# Patient Record
Sex: FEMALE | Race: WHITE | NOT HISPANIC OR LATINO | Employment: OTHER | URBAN - METROPOLITAN AREA
[De-identification: names, ages, dates, MRNs, and addresses within clinical notes are randomized per-mention and may not be internally consistent; named-entity substitution may affect disease eponyms.]

---

## 2017-02-13 ENCOUNTER — OFFICE VISIT (OUTPATIENT)
Dept: CARDIOLOGY | Facility: CLINIC | Age: 82
End: 2017-02-13

## 2017-02-13 VITALS
HEIGHT: 67 IN | SYSTOLIC BLOOD PRESSURE: 118 MMHG | BODY MASS INDEX: 24.17 KG/M2 | WEIGHT: 154 LBS | HEART RATE: 85 BPM | DIASTOLIC BLOOD PRESSURE: 60 MMHG

## 2017-02-13 DIAGNOSIS — I48.0 PAROXYSMAL ATRIAL FIBRILLATION (HCC): Primary | ICD-10-CM

## 2017-02-13 PROCEDURE — 93000 ELECTROCARDIOGRAM COMPLETE: CPT | Performed by: INTERNAL MEDICINE

## 2017-02-13 PROCEDURE — 99213 OFFICE O/P EST LOW 20 MIN: CPT | Performed by: INTERNAL MEDICINE

## 2017-02-13 NOTE — PROGRESS NOTES
Subjective:     Encounter Date:02/13/2017      Patient ID: Christianne Ordonez is a 87 y.o. female.    Chief Complaint: PAF, stroke    History of Present Illness     Dear Dr. Hoffmann,      I had the pleasure of seeing the patient in cardiac followup today.   As you well know, she is a lon 87-year-old woman with a history of paroxysmal atrial fibrillation and cerebral atherosclerosis.  She had an MCA stenosis causing a stroke, which we thought was possibly due to atrial fibrillation or intracerebral atherosclerosis. She was taking Eliquis for stroke prevention, which she has tolerated well.       She walks with a walker, and has had some occasional falls.   Luckily she has not had any serious bleeding as a result.      She still has an expressive aphasia, but really has recovered nicely from her stroke otherwise.           Review of Systems   All other systems reviewed and are negative.        ECG 12 Lead  Date/Time: 2/13/2017 2:49 PM  Performed by: GAYATRI ESQUIVEL  Authorized by: GAYATRI ESQUIVEL   Comparison: compared with previous ECG   Similar to previous ECG  Rhythm: sinus rhythm  BPM: 85  Comments: NSSTTWA               Objective:     Physical Exam   Constitutional: She is oriented to person, place, and time. She appears well-developed and well-nourished.   HENT:   Head: Normocephalic and atraumatic.   Neck: Normal range of motion. Neck supple.   Cardiovascular: Normal rate, regular rhythm and normal heart sounds.    Pulmonary/Chest: Effort normal and breath sounds normal.   Abdominal: Soft. Bowel sounds are normal.   Musculoskeletal: Normal range of motion.   Neurological: She is alert and oriented to person, place, and time.   Skin: Skin is warm and dry.   Psychiatric: She has a normal mood and affect. Her behavior is normal. Thought content normal.   Vitals reviewed.      Lab Review:       Assessment:          Diagnosis Plan   1. Paroxysmal atrial fibrillation            Plan:        It was a pleasure to see the  patient in cardiac follow-up today.  She is doing very well from the standpoint of her prior stroke.  She has had no new events.  She is tolerating her Eliquis well.  Given her age and her bleeding risk, I would feel more comfortable reducing her dose to 2.5 mg twice daily.  Otherwise, she will continue on the same medical regimen.  I will see her again in one year or sooner if symptoms warrant.      Atrial Fibrillation and Atrial Flutter  Assessment  • The patient has paroxysmal atrial fibrillation  • This is non-valvular in etiology  • The patient's CHADS2-VASc score is 5  • A [id number]-ROAw score of 2 or more is considered a high risk for a thromboembolic event  • Apixaban prescribed    Plan  • Attempt to maintain sinus rhythm  • Continue aspirin and apixaban for antithrombotic therapy, bleeding issues discussed  • Continue beta blocker for rate control

## 2017-05-02 ENCOUNTER — OFFICE VISIT (OUTPATIENT)
Dept: NEUROLOGY | Facility: CLINIC | Age: 82
End: 2017-05-02

## 2017-05-02 VITALS
HEIGHT: 67 IN | OXYGEN SATURATION: 96 % | SYSTOLIC BLOOD PRESSURE: 135 MMHG | DIASTOLIC BLOOD PRESSURE: 79 MMHG | HEART RATE: 79 BPM | BODY MASS INDEX: 25.11 KG/M2 | WEIGHT: 160 LBS

## 2017-05-02 DIAGNOSIS — Z86.73 HISTORY OF STROKE: ICD-10-CM

## 2017-05-02 DIAGNOSIS — I67.2 ATHEROSCLEROTIC CEREBROVASCULAR DISEASE: ICD-10-CM

## 2017-05-02 DIAGNOSIS — I48.91 ATRIAL FIBRILLATION, UNSPECIFIED TYPE (HCC): ICD-10-CM

## 2017-05-02 DIAGNOSIS — R41.82 ALTERED MENTAL STATUS, UNSPECIFIED ALTERED MENTAL STATUS TYPE: ICD-10-CM

## 2017-05-02 PROCEDURE — 99214 OFFICE O/P EST MOD 30 MIN: CPT | Performed by: NURSE PRACTITIONER

## 2017-05-02 RX ORDER — LORAZEPAM 0.5 MG/1
TABLET ORAL
Refills: 0 | COMMUNITY
Start: 2017-03-06 | End: 2019-05-29 | Stop reason: HOSPADM

## 2017-05-26 ENCOUNTER — HOSPITAL ENCOUNTER (OUTPATIENT)
Dept: NEUROLOGY | Facility: HOSPITAL | Age: 82
Discharge: HOME | End: 2017-05-26
Admitting: NURSE PRACTITIONER

## 2017-05-26 DIAGNOSIS — R41.82 ALTERED MENTAL STATUS, UNSPECIFIED ALTERED MENTAL STATUS TYPE: ICD-10-CM

## 2017-05-26 PROCEDURE — 95819 EEG AWAKE AND ASLEEP: CPT

## 2017-05-26 PROCEDURE — 95816 EEG AWAKE AND DROWSY: CPT | Performed by: PSYCHIATRY & NEUROLOGY

## 2017-05-31 ENCOUNTER — TELEPHONE (OUTPATIENT)
Dept: NEUROLOGY | Facility: CLINIC | Age: 82
End: 2017-05-31

## 2018-02-14 ENCOUNTER — OFFICE VISIT (OUTPATIENT)
Dept: CARDIOLOGY | Facility: CLINIC | Age: 83
End: 2018-02-14

## 2018-02-14 VITALS
BODY MASS INDEX: 24.36 KG/M2 | WEIGHT: 155.2 LBS | DIASTOLIC BLOOD PRESSURE: 68 MMHG | HEART RATE: 62 BPM | HEIGHT: 67 IN | SYSTOLIC BLOOD PRESSURE: 112 MMHG

## 2018-02-14 DIAGNOSIS — I48.0 PAROXYSMAL ATRIAL FIBRILLATION (HCC): Primary | ICD-10-CM

## 2018-02-14 PROCEDURE — 93000 ELECTROCARDIOGRAM COMPLETE: CPT | Performed by: PHYSICIAN ASSISTANT

## 2018-02-14 PROCEDURE — 99213 OFFICE O/P EST LOW 20 MIN: CPT | Performed by: PHYSICIAN ASSISTANT

## 2018-02-14 NOTE — PROGRESS NOTES
Date of Office Visit: 2018  Encounter Provider: DARIAN Chandra  Place of Service: [facility] CARDIOLOGY  Patient Name: Christianne Ordonez  :1929    Chief Complaint   Patient presents with   • Atrial Fibrillation     1 yr follow up   :     HPI: Christianne Ordonez is a 88 y.o. female , new to me, who presents today for follow-up.  Old records have been obtained and reviewed by me.  She is a patient of Dr. Lund's with a past cardiac history significant for paroxysmal atrial fibrillation and a cerebral atherosclerosis.  She has had a stroke in the past, and it was felt that this was due to either atrial fibrillation or and MCA stenosis.  She has been taking Eliquis for stroke prevention.  She was last in our office to see Dr. Lund on 2017.  At that visit, she was walking with a walker and had some recent falls, however she had not had any serious bleeding issues.  Because of her age and bleeding risk, Dr. Lund recommended reducing her dose of Eliquis down to 2.5 mg twice daily.  She is here today for yearly visit.   Since she was last in our office she's been doing well from a cardiac standpoint.  Fortunately she has not had anymore falls.  She is not having any bleeding issues on the Eliquis.  She lives with her son and daughter, who take excellent care of her.  She has some fatigue and some shortness of breath on exertion, however these are stable for her.  Overall she seems to be doing well.      Past Medical History:   Diagnosis Date   • Aphasia    • Arthritis    • Atrial fibrillation    • Cerebral atherosclerosis    • Depression    • Disease of thyroid gland    • Stroke        Past Surgical History:   Procedure Laterality Date   • KNEE SURGERY         Social History     Social History   • Marital status:      Spouse name: N/A   • Number of children: N/A   • Years of education: N/A     Occupational History   • Not on file.     Social History Main Topics   • Smoking status:  Never Smoker   • Smokeless tobacco: Never Used      Comment: caffeine use   • Alcohol use No   • Drug use: No   • Sexual activity: Not on file     Other Topics Concern   • Not on file     Social History Narrative       Family History   Problem Relation Age of Onset   • Cancer Sister    • Migraines Other        Review of Systems   Constitution: Positive for malaise/fatigue. Negative for chills, fever, weight gain and weight loss.   HENT: Negative for ear pain, hearing loss, nosebleeds and sore throat.    Eyes: Negative for double vision, pain and visual disturbance.   Cardiovascular: Negative for chest pain, dyspnea on exertion, irregular heartbeat, leg swelling, near-syncope, orthopnea, palpitations, paroxysmal nocturnal dyspnea and syncope.   Respiratory: Positive for shortness of breath. Negative for cough, sleep disturbances due to breathing, snoring and wheezing.    Endocrine: Negative for cold intolerance, heat intolerance and polyuria.   Skin: Negative for itching and rash.   Musculoskeletal: Negative for joint pain, joint swelling and myalgias.   Gastrointestinal: Negative for abdominal pain, diarrhea, melena, nausea and vomiting.   Genitourinary: Negative for frequency, hematuria and hesitancy.   Neurological: Negative for excessive daytime sleepiness, headaches, light-headedness, numbness, paresthesias and seizures.   Psychiatric/Behavioral: Negative for altered mental status and depression.   Allergic/Immunologic: Negative.    All other systems reviewed and are negative.      No Known Allergies      Current Outpatient Prescriptions:   •  apixaban (ELIQUIS) 2.5 MG tablet tablet, Take 1 tablet by mouth 2 (Two) Times a Day., Disp: 180 tablet, Rfl: 3  •  aspirin 81 MG tablet, Take by mouth daily., Disp: , Rfl:   •  atorvastatin (LIPITOR) 20 MG tablet, Take 1 tablet by mouth daily. As directed., Disp: , Rfl:   •  carvedilol (COREG) 3.125 MG tablet, Take 3.125 mg by mouth 2 (two) times a day with meals., Disp: ,  "Rfl:   •  docusate sodium (COLACE) 100 MG capsule, Take 1 capsule by mouth daily., Disp: , Rfl:   •  levothyroxine (SYNTHROID, LEVOTHROID) 50 MCG tablet, Take 50 mcg by mouth daily., Disp: , Rfl:   •  LORazepam (ATIVAN) 0.5 MG tablet, TK 4 TS PO QD, Disp: , Rfl: 0  •  PARoxetine (PAXIL) 20 MG tablet, TAKE ONE TABLET BY MOUTH ONCE DAILY, Disp: 90 tablet, Rfl: 0  •  saccharomyces boulardii (FLORASTOR) 250 MG capsule, Take 1 capsule by mouth 2 (two) times a day., Disp: , Rfl:   •  senna (SENOKOT) 8.6 MG tablet, Take 1 tablet by mouth daily., Disp: , Rfl:      Objective:     Vitals:    02/14/18 1510 02/14/18 1513   BP: 106/64 112/68   BP Location: Right arm Left arm   Pulse: 62    Weight: 70.4 kg (155 lb 3.2 oz)    Height: 170.2 cm (67\")      Body mass index is 24.31 kg/(m^2).    PHYSICAL EXAM:    Physical Exam   Constitutional: She is oriented to person, place, and time. She appears well-developed and well-nourished. No distress.   HENT:   Head: Normocephalic and atraumatic.   Eyes: Pupils are equal, round, and reactive to light.   Neck: No JVD present. No thyromegaly present.   Cardiovascular: Normal rate, normal heart sounds and intact distal pulses.  An irregular rhythm present.   No murmur heard.  Pulmonary/Chest: Effort normal and breath sounds normal. No respiratory distress.   Abdominal: Soft. Bowel sounds are normal. She exhibits no distension. There is no splenomegaly or hepatomegaly. There is no tenderness.   Musculoskeletal: Normal range of motion. She exhibits no edema.   Neurological: She is alert and oriented to person, place, and time.   Skin: Skin is warm and dry. She is not diaphoretic. No erythema.   Psychiatric: She has a normal mood and affect. Her behavior is normal. Judgment normal.         ECG 12 Lead  Date/Time: 2/14/2018 3:23 PM  Performed by: AJIT LEYVA.  Authorized by: AJIT LEYVA   Comparison: compared with previous ECG from 2/13/2017  Similar to previous ECG  Rhythm: atrial " fibrillation  BPM: 62  Clinical impression: abnormal ECG  Comments: Indication: Atrial fibrillation.              Assessment:       Diagnosis Plan   1. Paroxysmal atrial fibrillation  ECG 12 Lead     Orders Placed This Encounter   Procedures   • ECG 12 Lead     This order was created via procedure documentation          Plan:       1.  Atrial Fibrillation and Atrial Flutter  Assessment  • The patient has permanent atrial fibrillation  • This is non-valvular in etiology  • The patient's CHADS2-VASc score is 5  • A [id number]-IFRt score of 2 or more is considered a high risk for a thromboembolic event  • Apixaban prescribed    Plan  • Continue in atrial fibrillation with rate control  • Continue apixaban for antithrombotic therapy, bleeding issues discussed  • Continue beta blocker for rate control    Subjective - Objective  • Overall she stable and doing about the same.  I do think she is probably slowing down a little bit with her age, however she has no complaints of angina or heart failure at this time.  She is currently anticoagulated on low-dose Eliquis.  She has not had any falls.  I did discuss with the son that we may come to a place where we would have to discontinue the Eliquis if she starts having falls and becomes of bleeding risk.  However right now she appears to be stable, and her children are taking excellent care of her.  I'm not going to make any changes to her medical regimen today, and she will follow-up with Dr. Lund in 1 year.        As always, it has been a pleasure to participate in your patient's care.      Sincerely,         Codie Contreras PA-C

## 2018-02-27 RX ORDER — APIXABAN 2.5 MG/1
TABLET, FILM COATED ORAL
Qty: 180 TABLET | Refills: 3 | Status: SHIPPED | OUTPATIENT
Start: 2018-02-27 | End: 2020-01-21 | Stop reason: SDUPTHER

## 2018-05-08 ENCOUNTER — OFFICE VISIT (OUTPATIENT)
Dept: NEUROLOGY | Facility: CLINIC | Age: 83
End: 2018-05-08

## 2018-05-08 VITALS
WEIGHT: 152 LBS | HEART RATE: 86 BPM | BODY MASS INDEX: 23.86 KG/M2 | SYSTOLIC BLOOD PRESSURE: 105 MMHG | HEIGHT: 67 IN | OXYGEN SATURATION: 93 % | DIASTOLIC BLOOD PRESSURE: 59 MMHG

## 2018-05-08 DIAGNOSIS — R47.01 ACQUIRED APHASIA: ICD-10-CM

## 2018-05-08 DIAGNOSIS — Z86.73 HISTORY OF STROKE: ICD-10-CM

## 2018-05-08 DIAGNOSIS — I67.2 ATHEROSCLEROTIC CEREBROVASCULAR DISEASE: ICD-10-CM

## 2018-05-08 DIAGNOSIS — R41.3 MEMORY LOSS: ICD-10-CM

## 2018-05-08 PROCEDURE — 99213 OFFICE O/P EST LOW 20 MIN: CPT | Performed by: NURSE PRACTITIONER

## 2018-05-08 RX ORDER — DONEPEZIL HYDROCHLORIDE 5 MG/1
5 TABLET, FILM COATED ORAL NIGHTLY
Qty: 30 TABLET | Refills: 1 | Status: SHIPPED | OUTPATIENT
Start: 2018-05-08 | End: 2018-06-06 | Stop reason: DRUGHIGH

## 2018-05-08 NOTE — PROGRESS NOTES
DOS: 5/10/2018  NAME: Christianne Ordonez   : 1929  PCP: Ray Torre MD    Chief Complaint   Patient presents with   • Stroke        Neurological Problem and Interval History:  89 y.o. RHW female with Afib, HLD, HTN and stroke. She presents today for her annual stroke follow up.     She denies any new S/S of stroke. She does have falls, loses her balance, rome when backing up. She does not LOC. Prior to her stroke she had syncope. No H/As. She did have one stool that had bright red blood, 3 weeks ago. She does have hemorrhoids. She does take 3 different medicaitons for constipation. She is otherwise tolerating her Eliquis and ASA. She sometimes take melatonin but she does take ativan to sleep. She is more confused at night. Her granddaughter notes her memory is a bit worse. She could not tell me the month an it is her birthday month. She always calls her daughter by the wrong name but this is not new. She would like something for her memory. She does sleep most of the day and then has trouble sleeping at night. She is able to dress and feed herself. Her daughter helps her with house chores and cooking. Her daughter lives with her. Her last visit we did an EEG and it was normal.     2015. She had a Left hemispheric stroke with new Dx of A Fib and MCA stenosis . She was not on any blood thinners or ASA. She is able to do some of her ADL's since her stroke but she is not back to baseline. She was D/C to subacute rehab and now has home therapy. She has been on Eliquis and ASA since.     Review of Systems:        Review of Systems   Constitutional: Positive for fatigue. Negative for activity change, appetite change, chills, diaphoresis, fever and unexpected weight change.   HENT: Positive for tinnitus (right ear) and trouble swallowing. Negative for drooling, hearing loss and voice change.    Eyes: Positive for visual disturbance (left eye blurry). Negative for photophobia and pain.   Respiratory:  "Positive for shortness of breath and wheezing. Negative for chest tightness.    Cardiovascular: Positive for leg swelling. Negative for chest pain and palpitations.   Gastrointestinal: Negative for blood in stool, nausea and vomiting.   Endocrine: Negative for cold intolerance and heat intolerance.   Genitourinary: Negative for difficulty urinating.   Musculoskeletal: Positive for gait problem and neck pain (right side). Negative for neck stiffness.   Skin: Negative for rash.   Neurological: Positive for dizziness (pt has fallen recently), speech difficulty and light-headedness. Negative for tremors, seizures, syncope, facial asymmetry, weakness, numbness and headaches.   Hematological: Bruises/bleeds easily.   Psychiatric/Behavioral: Positive for agitation, confusion and sleep disturbance. Negative for behavioral problems, hallucinations and suicidal ideas. The patient is nervous/anxious.        \"The following portions of the patient's history were reviewed and updated as appropriate: allergies, current medications, past family history, past medical history, past social history, past surgical history and problem list.\"      Laboratory Results:             9/2017 , , LDL 68, HDL 47, TSH 20.96  No results found for: HGBA1C    No results found for: RPR  No results found for: [id number]    Physical Examination:  mRS: 2  General Appearance:   Well developed, well nourished, well groomed, alert, and cooperative.  HEENT: Normocephalic.    Neck and Spine: No bruits.  Cardiac: Irregularly irregular. No murmurs.  Extremities:    No edema or deformities.   Neurological examination:  Higher Integrative  Function: Oriented to self, no time. Normal registration, 3/3 recall with visual objects, attention span and concentration. Normal language including comprehension, aphasia with word searching on spontaneous speech, repetition, reading, naming and vocabulary all with paraphasic errors. No neglect with normal " visual-spatial function and construction. Fair fund of knowledge and higher integrative function.  CN II: Normal visual acuity and visual fields.    CN III IV VI: Extraocular movements are full without nystagmus.   CN V: Normal facial sensation and strength of muscles of mastication.  CN VII: Facial movements are symmetric. No weakness.  CN VIII:   Auditory acuity is normal.  CN IX & X:   Symmetric palatal movement.  CN XI: Sternocleidomastoid and trapezius are normal.  No weakness.  CN XII:   The tongue is midline.  No atrophy or fasciculations.  Motor: Normal muscle strength, bulk and tone in upper and lower extremities.  Termor of hands, L>R  Sensation: Normal to light touch, temperature, and proprioception in arms and legs. Normal graphesthesia and no extinction on DSS.  Station and Gait: Unsteady, slow, uses a walker   Coordination: Rapid alternating movements are normal.      Diagnoses / Discussion:  Ms. Ordonez is an 89yo who presents today for her annual stroke follow up. She has residual word searching/aphasia from her LMCA stroke from 1/2015. The etiology of her stroke is her new onset atrial fibrillation, not on anticoagulation at the time of the event. She has not had any obvious recurrent stroke symptoms on Eliquis and ASA. She has continued memory trouble which may be slightly worse. I will discuss the need for neuropsych testing and starting Aricept with Dr. Luna. She likely has sundowning and may benefit from seroquel, with wean off of ativan, will discuss at FU visit with starting this if does well on aricept.   She is on good secondary stroke prevention medications, continue to monitor BP. She needs to FU with Dr. Torre for lipid panel but also FU TSH. It was very elevated in 9/2017 which is concerning. She and her granddaughter agree with the plan and will call with any questions or concerns.        Plan:   Start donepezil 5mg daily for 1 month then increase to 10mg   Consider seroquel     continue eliquis and ASA    Blood pressure control to <130/80   Goal LDL <70-recommend high dose statins-    Serum glucose < 140   Call 911 for stroke any stroke symptoms   Follow-up 1 month  Christianne was seen today for stroke.    Diagnoses and all orders for this visit:    History of stroke    Memory loss  -     donepezil (ARICEPT) 5 MG tablet; Take 1 tablet by mouth Every Night.    Atherosclerotic cerebrovascular disease    Acquired aphasia        Coding

## 2018-06-06 ENCOUNTER — OFFICE VISIT (OUTPATIENT)
Dept: NEUROLOGY | Facility: CLINIC | Age: 83
End: 2018-06-06

## 2018-06-06 VITALS
OXYGEN SATURATION: 94 % | HEIGHT: 67 IN | SYSTOLIC BLOOD PRESSURE: 108 MMHG | WEIGHT: 151 LBS | HEART RATE: 87 BPM | BODY MASS INDEX: 23.7 KG/M2 | DIASTOLIC BLOOD PRESSURE: 58 MMHG

## 2018-06-06 DIAGNOSIS — R41.3 MEMORY LOSS: ICD-10-CM

## 2018-06-06 PROCEDURE — 99212 OFFICE O/P EST SF 10 MIN: CPT | Performed by: NURSE PRACTITIONER

## 2018-06-06 RX ORDER — DONEPEZIL HYDROCHLORIDE 10 MG/1
10 TABLET, FILM COATED ORAL NIGHTLY
Qty: 90 TABLET | Refills: 3 | Status: SHIPPED | OUTPATIENT
Start: 2018-06-06 | End: 2019-05-29 | Stop reason: HOSPADM

## 2018-06-06 NOTE — PROGRESS NOTES
DOS: 2018  NAME: Christianne Ordonez   : 1929  PCP: Ray Torre MD    Chief Complaint   Patient presents with   • Stroke        Neurological Problem and Interval History:  89 y.o. RHW female with Afib, HLD, HTN, dementia and stroke. She presents today for memory loss follow up.     She is tolerating the 5mg of Aricept. She feels it has helped her over all demeanor. She denies any new S/S of stroke. Her granddaughter notes she has good and bad days and the days she is around her daughter she is irritated and has bad days. They deny hallucinations or mood issues.        Review of Systems:        Review of Systems   Constitutional: Positive for fatigue. Negative for chills and fever.   HENT: Positive for tinnitus (sometimes) and trouble swallowing. Negative for hearing loss and postnasal drip.    Eyes: Positive for pain, discharge, redness, itching and visual disturbance.   Respiratory: Positive for shortness of breath and wheezing. Negative for cough.    Cardiovascular: Negative for chest pain, palpitations and leg swelling.   Gastrointestinal: Negative for constipation, diarrhea, nausea and vomiting.   Endocrine: Negative for cold intolerance, heat intolerance and polydipsia.   Genitourinary: Negative for decreased urine volume, difficulty urinating, frequency and urgency.   Musculoskeletal: Positive for back pain (when standing up) and gait problem (uses a walker). Negative for neck pain and neck stiffness.   Skin: Negative for color change, rash and wound.   Allergic/Immunologic: Negative for environmental allergies, food allergies and immunocompromised state.   Neurological: Positive for numbness (left leg sometimes). Negative for dizziness, tremors, seizures, syncope, facial asymmetry, speech difficulty, weakness, light-headedness and headaches.   Hematological: Negative for adenopathy. Bruises/bleeds easily.   Psychiatric/Behavioral: Positive for sleep disturbance. Negative for agitation, behavioral  "problems, confusion, decreased concentration, dysphoric mood, hallucinations, self-injury and suicidal ideas. The patient is nervous/anxious. The patient is not hyperactive.        Current Outpatient Prescriptions on File Prior to Visit   Medication Sig Dispense Refill   • aspirin 81 MG tablet Take by mouth daily.     • atorvastatin (LIPITOR) 20 MG tablet Take 1 tablet by mouth daily. As directed.     • carvedilol (COREG) 3.125 MG tablet Take 3.125 mg by mouth 2 (two) times a day with meals.     • docusate sodium (COLACE) 100 MG capsule Take 1 capsule by mouth daily.     • ELIQUIS 2.5 MG tablet tablet TAKE 1 TABLET BY MOUTH TWICE DAILY 180 tablet 3   • levothyroxine (SYNTHROID, LEVOTHROID) 50 MCG tablet Take 50 mcg by mouth daily.     • LORazepam (ATIVAN) 0.5 MG tablet TK 4 TS PO QD  0   • PARoxetine (PAXIL) 20 MG tablet TAKE ONE TABLET BY MOUTH ONCE DAILY 90 tablet 0   • saccharomyces boulardii (FLORASTOR) 250 MG capsule Take 1 capsule by mouth 2 (two) times a day.     • senna (SENOKOT) 8.6 MG tablet Take 1 tablet by mouth daily.       No current facility-administered medications on file prior to visit.       \"The following portions of the patient's history were reviewed and updated as appropriate: allergies, current medications, past family history, past medical history, past social history, past surgical history and problem list.\"    Laboratory Results:             No results found for: HGBA1C    No results found for: CHOL    No results found for: HDL    No results found for: LDL    No results found for: TRIG  No results found for: RPR  No results found for: TSH  No results found for: [id number]    Physical Examination:   General Appearance:   Well developed, well nourished, well groomed, alert, and cooperative.  HEENT: Normocephalic.      Neurological examination:  Higher Integrative  Function: Oriented to June, 2010 when given list, Trump, place and person. Normal registration, attention span and concentration. " Normal language including comprehension, spontaneous speech, repetition, naming and vocabulary. No neglect. Poor-fair fund of knowledge and higher integrative function.  CN III IV VI: Extraocular movements are full without nystagmus.   CN V: Normal facial sensation and strength of muscles of mastication.  CN VII: Facial movements are symmetric. No weakness.  CN VIII:   Bilateral decreased hearing  CN IX & X:   Symmetric palatal movement.  CN XI: Sternocleidomastoid and trapezius are normal.  No weakness.  CN XII:   The tongue is midline.  No atrophy or fasciculations.  Motor: Normal muscle strength, bulk and tone in upper and lower extremities.    Station and Gait:  Slow, cautious, with a limp on the left      Diagnoses / Discussion:  Ms. Ordonez is an 88yo who presents today for follow for memory loss and initiation of Aricept. She has tolerated this. I will increase her dose to 10mg nightly. They will call with any side affects. They will call with any change in behavior or hallucinations.     Plan:   Increase Aricept to 10mg    Blood pressure control to <130/80   Goal LDL <70-recommend high dose statins-    Serum glucose < 140   Call 911 for stroke any stroke symptoms   Follow-up 1 year  Christianne was seen today for stroke.    Diagnoses and all orders for this visit:    Memory loss  -     donepezil (ARICEPT) 10 MG tablet; Take 1 tablet by mouth Every Night.        Coding

## 2018-08-08 DIAGNOSIS — R41.3 MEMORY LOSS: ICD-10-CM

## 2018-08-08 RX ORDER — DONEPEZIL HYDROCHLORIDE 5 MG/1
5 TABLET, FILM COATED ORAL NIGHTLY
Qty: 30 TABLET | Refills: 0 | Status: SHIPPED | OUTPATIENT
Start: 2018-08-08 | End: 2019-07-29 | Stop reason: SDUPTHER

## 2018-12-22 ENCOUNTER — APPOINTMENT (OUTPATIENT)
Dept: GENERAL RADIOLOGY | Facility: HOSPITAL | Age: 83
End: 2018-12-22

## 2018-12-22 ENCOUNTER — HOSPITAL ENCOUNTER (EMERGENCY)
Facility: HOSPITAL | Age: 83
Discharge: HOME | End: 2018-12-22
Attending: EMERGENCY MEDICINE | Admitting: EMERGENCY MEDICINE

## 2018-12-22 ENCOUNTER — APPOINTMENT (OUTPATIENT)
Dept: CT IMAGING | Facility: HOSPITAL | Age: 83
End: 2018-12-22

## 2018-12-22 VITALS
BODY MASS INDEX: 25.43 KG/M2 | HEIGHT: 67 IN | OXYGEN SATURATION: 94 % | DIASTOLIC BLOOD PRESSURE: 75 MMHG | HEART RATE: 67 BPM | TEMPERATURE: 99.4 F | RESPIRATION RATE: 16 BRPM | SYSTOLIC BLOOD PRESSURE: 121 MMHG | WEIGHT: 162 LBS

## 2018-12-22 DIAGNOSIS — S22.42XA MULTIPLE FRACTURES OF RIBS, LEFT SIDE, INITIAL ENCOUNTER FOR CLOSED FRACTURE: Primary | ICD-10-CM

## 2018-12-22 PROCEDURE — 70450 CT HEAD/BRAIN W/O DYE: CPT

## 2018-12-22 PROCEDURE — 71101 X-RAY EXAM UNILAT RIBS/CHEST: CPT

## 2018-12-22 PROCEDURE — 99284 EMERGENCY DEPT VISIT MOD MDM: CPT

## 2018-12-22 RX ORDER — HYDROCODONE BITARTRATE AND ACETAMINOPHEN 5; 325 MG/1; MG/1
1 TABLET ORAL EVERY 6 HOURS PRN
Qty: 15 TABLET | Refills: 0 | Status: ON HOLD | OUTPATIENT
Start: 2018-12-22 | End: 2019-05-29 | Stop reason: SDUPTHER

## 2018-12-22 NOTE — ED PROVIDER NOTES
EMERGENCY DEPARTMENT ENCOUNTER    CHIEF COMPLAINT  Chief Complaint: Pain post fall  History given by: Pt, Family  History limited by: none  Room Number: 14/14  PMD: Ray Torre MD      HPI:  Pt is a 89 y.o. female who presents complaining of worsening pain PTA after fall 4 days ago. Pt c/o left rib pain. Per family, pt has been complaining of left breast pain. Pt states she didn't come in initially due to feeling fine. Pt unsure if she hit her head. Pt is on Eliquis.     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     Resolved Ambulatory Problems     Diagnosis Date Noted   • No Resolved Ambulatory Problems     Past Medical History:   Diagnosis Date   • Aphasia    • Arthritis    • Atrial fibrillation (CMS/Formerly [facility])    • Cerebral atherosclerosis    • Depression    • Disease of thyroid gland    • Stroke (CMS/Formerly [facility])        PAST SURGICAL HISTORY  Past Surgical History:   Procedure Laterality Date   • KNEE SURGERY         FAMILY HISTORY  Family History   Problem Relation Age of Onset   • Cancer Sister    • Migraines Other        SOCIAL HISTORY  Social History     Socioeconomic History   • Marital status:      Spouse name: Not on file   • Number of children: Not on file   • Years of education: Not on file   • Highest education level: Not on file   Social Needs   • Financial resource strain: Not on file   • Food insecurity - worry: Not on file   • Food insecurity - inability: Not on file   • Transportation needs - medical:  Not on file   • Transportation needs - non-medical: Not on file   Occupational History   • Not on file   Tobacco Use   • Smoking status: Never Smoker   • Smokeless tobacco: Never Used   • Tobacco comment: caffeine use   Substance and Sexual Activity   • Alcohol use: No   • Drug use: No   • Sexual activity: Not on file   Other Topics Concern   • Not on file   Social History Narrative   • Not on file       ALLERGIES  Patient has no known allergies.    REVIEW OF SYSTEMS  Review of Systems   Constitutional: Negative for fever.   HENT: Negative for sore throat.    Eyes: Negative.    Respiratory: Negative for cough and shortness of breath.    Cardiovascular: Positive for chest pain (left rib pain).   Gastrointestinal: Negative for abdominal pain, diarrhea and vomiting.   Genitourinary: Negative for dysuria.   Musculoskeletal: Negative for neck pain.   Skin: Negative for rash.   Allergic/Immunologic: Negative.    Neurological: Negative for weakness, numbness and headaches.   Hematological: Negative.    Psychiatric/Behavioral: Negative.    All other systems reviewed and are negative.      PHYSICAL EXAM  ED Triage Vitals   Temp Heart Rate Resp BP SpO2   12/22/18 0017 12/22/18 0017 12/22/18 0017 12/22/18 0017 12/22/18 0017   99.4 °F (37.4 °C) 72 17 148/78 95 %      Temp src Heart Rate Source Patient Position BP Location FiO2 (%)   12/22/18 0017 12/22/18 0031 12/22/18 0031 12/22/18 0031 --   Tympanic Monitor Sitting Right arm        Physical Exam   Constitutional: She is oriented to person, place, and time. No distress.   Eyes: EOM are normal.   Neck: Normal range of motion.   Cardiovascular: Normal rate and regular rhythm.   Pulmonary/Chest: Effort normal and breath sounds normal. No respiratory distress. She exhibits tenderness (mild left lateral).   Neurological: She is alert and oriented to person, place, and time. She has normal sensation and normal strength.   Skin: Skin is warm and dry.   No ecchymosis to chest    Psychiatric: Affect normal.   Nursing note and vitals reviewed.      RADIOLOGY  CT Head Without Contrast   Final Result   1. No acute intracranial abnormality.                           This report was finalized on 12/22/2018 1:13 AM by Andrea Galicia M.D.          XR Ribs Left With PA Chest   Final Result   Left rib fractures as above, no pneumothorax       This report was finalized on 12/22/2018 1:11 AM by Andrea Galicia M.D.               I ordered the above noted radiological studies. Interpreted by radiologist. Reviewed by me in PACS.       PROCEDURES  Procedures      PROGRESS AND CONSULTS     0045  Ordered CT Head and XR L rib for further evaluation.    0125  Pt recheck. Pt is resting in bed comfortably. Family at bedside. Notified pt of negative CT Head results and XR L rib that shows Fx of left 4th and 5th ribs that are slightly displaced. Ordered spirometer to check breathing daily. Discussed plan to discharge pt home with Rx for hydrocodone. Pt and family understands and agrees with plan, all concerns addressed.     MEDICAL DECISION MAKING  Results were reviewed/discussed with the patient and they were also made aware of online access. Pt also made aware that some labs, such as cultures, will not be resulted during ER visit and follow up with PMD is necessary.     MDM  Number of Diagnoses or Management Options  Multiple fractures of ribs, left side, initial encounter for closed fracture:      Amount and/or Complexity of Data Reviewed  Tests in the radiology section of CPT®: ordered and reviewed (XR L rib that shows Fx of left 4th and 5th ribs that are slightly displaced. CT Head negative acute. )           DIAGNOSIS  Final diagnoses:   Multiple fractures of ribs, left side, initial encounter for closed fracture       DISPOSITION  DISCHARGE    Patient discharged in stable condition.    Reviewed implications of results, diagnosis, meds, responsibility to follow up, warning signs and symptoms of possible  worsening, potential complications and reasons to return to ER.    Patient/Family voiced understanding of above instructions.    Discussed plan for discharge, as there is no emergent indication for admission. Patient referred to primary care provider for BP management due to today's BP. Pt/family is agreeable and understands need for follow up and repeat testing.  Pt is aware that discharge does not mean that nothing is wrong but it indicates no emergency is present that requires admission and they must continue care with follow-up as given below or physician of their choice.     FOLLOW-UP  Ray Torre MD  [address] [id number] [phone]    Schedule an appointment as soon as possible for a visit            Medication List      New Prescriptions    HYDROcodone-acetaminophen 5-325 MG per tablet  Commonly known as:  NORCO  Take 1 tablet by mouth Every 6 (Six) Hours As Needed for Moderate Pain .              Latest Documented Vital Signs:  As of 2:29 AM  BP- 121/75 HR- 67 Temp- 99.4 °F (37.4 °C) (Tympanic) O2 sat- 94%    --  Documentation assistance provided by sonu Junior for Dr. Thao.  Information recorded by the scribe was done at my direction and has been verified and validated by me.          John Junior  12/22/18 [id number]       Kurtis Thao MD  12/22/18 9137     Abdomen soft, nontender, nondistended, bowel sounds present in all 4 quadrants.

## 2018-12-22 NOTE — ED NOTES
Pt reports falling 3 days ago. Pt was not seen for fall. C/o left shoulder pain and left breast pain. Pt unsure if hit head. Denies LOC. Pt takes eliquis.        Noreen Morales RN  12/22/18 0021

## 2019-05-21 ENCOUNTER — APPOINTMENT (OUTPATIENT)
Dept: CT IMAGING | Facility: HOSPITAL | Age: 84
End: 2019-05-21

## 2019-05-21 ENCOUNTER — HOSPITAL ENCOUNTER (EMERGENCY)
Facility: HOSPITAL | Age: 84
Discharge: HOME | End: 2019-05-21
Attending: EMERGENCY MEDICINE | Admitting: EMERGENCY MEDICINE

## 2019-05-21 ENCOUNTER — APPOINTMENT (OUTPATIENT)
Dept: GENERAL RADIOLOGY | Facility: HOSPITAL | Age: 84
End: 2019-05-21

## 2019-05-21 VITALS
HEIGHT: 67 IN | RESPIRATION RATE: 18 BRPM | BODY MASS INDEX: 28.25 KG/M2 | OXYGEN SATURATION: 94 % | HEART RATE: 55 BPM | TEMPERATURE: 98 F | DIASTOLIC BLOOD PRESSURE: 64 MMHG | SYSTOLIC BLOOD PRESSURE: 143 MMHG | WEIGHT: 180 LBS

## 2019-05-21 DIAGNOSIS — S00.81XA ABRASION OF FOREHEAD, INITIAL ENCOUNTER: Primary | ICD-10-CM
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
## 2019-05-21 DIAGNOSIS — S82.045A CLOSED NONDISPLACED COMMINUTED FRACTURE OF LEFT PATELLA, INITIAL ENCOUNTER: ICD-10-CM

## 2019-05-21 DIAGNOSIS — S70.02XA CONTUSION OF LEFT HIP, INITIAL ENCOUNTER: ICD-10-CM

## 2019-05-21 PROCEDURE — 73560 X-RAY EXAM OF KNEE 1 OR 2: CPT

## 2019-05-21 PROCEDURE — 99283 EMERGENCY DEPT VISIT LOW MDM: CPT

## 2019-05-21 PROCEDURE — 73502 X-RAY EXAM HIP UNI 2-3 VIEWS: CPT

## 2019-05-21 PROCEDURE — 70450 CT HEAD/BRAIN W/O DYE: CPT

## 2019-05-24 PROCEDURE — 99284 EMERGENCY DEPT VISIT MOD MDM: CPT

## 2019-05-25 ENCOUNTER — HOSPITAL ENCOUNTER (INPATIENT)
Facility: HOSPITAL | Age: 84
LOS: 3 days | Discharge: NURSING, REHAB OR LONG-TERM CARE | End: 2019-05-29
Attending: EMERGENCY MEDICINE | Admitting: HOSPITALIST
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
## 2019-05-25 DIAGNOSIS — M62.3 IMMOBILITY SYNDROME: ICD-10-CM

## 2019-05-25 DIAGNOSIS — S82.032A CLOSED DISPLACED TRANSVERSE FRACTURE OF LEFT PATELLA, INITIAL ENCOUNTER: Primary | ICD-10-CM

## 2019-05-25 DIAGNOSIS — R26.2 DIFFICULTY WALKING: ICD-10-CM
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
## 2019-05-25 LAB
ALBUMIN SERPL-MCNC: 4.2 G/DL (ref 3.5–5.2)
ALBUMIN/GLOB SERPL: 1.1 G/DL
ALP SERPL-CCNC: 94 U/L (ref 39–117)
ALT SERPL W P-5'-P-CCNC: 19 U/L (ref 1–33)
ANION GAP SERPL CALCULATED.3IONS-SCNC: 11.2 MMOL/L
APTT PPP: 32.9 SECONDS (ref 22.7–35.4)
AST SERPL-CCNC: 26 U/L (ref 1–32)
BACTERIA UR QL AUTO: ABNORMAL /HPF
BASOPHILS # BLD AUTO: 0.12 10*3/MM3 (ref 0–0.2)
BASOPHILS NFR BLD AUTO: 0.8 % (ref 0–1.5)
BILIRUB SERPL-MCNC: 0.9 MG/DL (ref 0.2–1.2)
BILIRUB UR QL STRIP: NEGATIVE
BUN BLD-MCNC: 19 MG/DL (ref 8–23)
BUN/CREAT SERPL: 24.4 (ref 7–25)
CALCIUM SPEC-SCNC: 9.4 MG/DL (ref 8.2–9.6)
CHLORIDE SERPL-SCNC: 100 MMOL/L (ref 98–107)
CK SERPL-CCNC: 191 U/L (ref 20–180)
CLARITY UR: ABNORMAL
CO2 SERPL-SCNC: 31.8 MMOL/L (ref 22–29)
COLOR UR: ABNORMAL
CREAT BLD-MCNC: 0.78 MG/DL (ref 0.57–1)
DEPRECATED RDW RBC AUTO: 63.1 FL (ref 37–54)
EOSINOPHIL # BLD AUTO: 0.27 10*3/MM3 (ref 0–0.4)
EOSINOPHIL NFR BLD AUTO: 1.8 % (ref 0.3–6.2)
ERYTHROCYTE [DISTWIDTH] IN BLOOD BY AUTOMATED COUNT: 17.7 % (ref 12.3–15.4)
GFR SERPL CREATININE-BSD FRML MDRD: 69 ML/MIN/1.73
GLOBULIN UR ELPH-MCNC: 3.8 GM/DL
GLUCOSE BLD-MCNC: 115 MG/DL (ref 65–99)
GLUCOSE UR STRIP-MCNC: NEGATIVE MG/DL
HCT VFR BLD AUTO: 43 % (ref 34–46.6)
HGB BLD-MCNC: 13.4 G/DL (ref 12–15.9)
HGB UR QL STRIP.AUTO: ABNORMAL
HYALINE CASTS UR QL AUTO: ABNORMAL /LPF
IMM GRANULOCYTES # BLD AUTO: 0.09 10*3/MM3 (ref 0–0.05)
IMM GRANULOCYTES NFR BLD AUTO: 0.6 % (ref 0–0.5)
INR PPP: 1.27 (ref 0.9–1.1)
KETONES UR QL STRIP: NEGATIVE
LEUKOCYTE ESTERASE UR QL STRIP.AUTO: ABNORMAL
LYMPHOCYTES # BLD AUTO: 1.94 10*3/MM3 (ref 0.7–3.1)
LYMPHOCYTES NFR BLD AUTO: 13.2 % (ref 19.6–45.3)
MCH RBC QN AUTO: 30.3 PG (ref 26.6–33)
MCHC RBC AUTO-ENTMCNC: 31.2 G/DL (ref 31.5–35.7)
MCV RBC AUTO: 97.3 FL (ref 79–97)
MONOCYTES # BLD AUTO: 1.21 10*3/MM3 (ref 0.1–0.9)
MONOCYTES NFR BLD AUTO: 8.2 % (ref 5–12)
NEUTROPHILS # BLD AUTO: 11.04 10*3/MM3 (ref 1.7–7)
NEUTROPHILS NFR BLD AUTO: 75.4 % (ref 42.7–76)
NITRITE UR QL STRIP: NEGATIVE
NRBC BLD AUTO-RTO: 0.1 /100 WBC (ref 0–0.2)
PH UR STRIP.AUTO: <=5 [PH] (ref 5–8)
PLATELET # BLD AUTO: 229 10*3/MM3 (ref 140–450)
PMV BLD AUTO: 11.1 FL (ref 6–12)
POTASSIUM BLD-SCNC: 4.5 MMOL/L (ref 3.5–5.2)
PROT SERPL-MCNC: 8 G/DL (ref 6–8.5)
PROT UR QL STRIP: ABNORMAL
PROTHROMBIN TIME: 15.6 SECONDS (ref 11.7–14.2)
RBC # BLD AUTO: 4.42 10*6/MM3 (ref 3.77–5.28)
RBC # UR: ABNORMAL /HPF
REF LAB TEST METHOD: ABNORMAL
SODIUM BLD-SCNC: 143 MMOL/L (ref 136–145)
SP GR UR STRIP: 1.03 (ref 1–1.03)
SQUAMOUS #/AREA URNS HPF: ABNORMAL /HPF
UROBILINOGEN UR QL STRIP: ABNORMAL
WBC NRBC COR # BLD: 14.67 10*3/MM3 (ref 3.4–10.8)
WBC UR QL AUTO: ABNORMAL /HPF

## 2019-05-25 PROCEDURE — G0378 HOSPITAL OBSERVATION PER HR: HCPCS

## 2019-05-25 PROCEDURE — 82550 ASSAY OF CK (CPK): CPT | Performed by: PHYSICIAN ASSISTANT

## 2019-05-25 PROCEDURE — 85730 THROMBOPLASTIN TIME PARTIAL: CPT | Performed by: PHYSICIAN ASSISTANT

## 2019-05-25 PROCEDURE — 85025 COMPLETE CBC W/AUTO DIFF WBC: CPT | Performed by: PHYSICIAN ASSISTANT

## 2019-05-25 PROCEDURE — 25010000002 HYDROMORPHONE PER 4 MG: Performed by: HOSPITALIST

## 2019-05-25 PROCEDURE — 99222 1ST HOSP IP/OBS MODERATE 55: CPT | Performed by: ORTHOPAEDIC SURGERY

## 2019-05-25 PROCEDURE — 85610 PROTHROMBIN TIME: CPT | Performed by: PHYSICIAN ASSISTANT

## 2019-05-25 PROCEDURE — 80053 COMPREHEN METABOLIC PANEL: CPT | Performed by: PHYSICIAN ASSISTANT

## 2019-05-25 PROCEDURE — 81001 URINALYSIS AUTO W/SCOPE: CPT | Performed by: PHYSICIAN ASSISTANT

## 2019-05-25 PROCEDURE — 93010 ELECTROCARDIOGRAM REPORT: CPT | Performed by: INTERNAL MEDICINE

## 2019-05-25 PROCEDURE — 93005 ELECTROCARDIOGRAM TRACING: CPT | Performed by: HOSPITALIST

## 2019-05-25 RX ORDER — DONEPEZIL HYDROCHLORIDE 10 MG/1
10 TABLET, FILM COATED ORAL NIGHTLY
Status: DISCONTINUED | OUTPATIENT
Start: 2019-05-25 | End: 2019-05-29 | Stop reason: HOSPADM

## 2019-05-25 RX ORDER — CARVEDILOL 3.12 MG/1
3.12 TABLET ORAL EVERY 12 HOURS SCHEDULED
Status: DISCONTINUED | OUTPATIENT
Start: 2019-05-25 | End: 2019-05-29 | Stop reason: HOSPADM

## 2019-05-25 RX ORDER — LORAZEPAM 0.5 MG/1
0.5 TABLET ORAL NIGHTLY
Status: DISCONTINUED | OUTPATIENT
Start: 2019-05-25 | End: 2019-05-27

## 2019-05-25 RX ORDER — SODIUM CHLORIDE 0.9 % (FLUSH) 0.9 %
10 SYRINGE (ML) INJECTION AS NEEDED
Status: DISCONTINUED | OUTPATIENT
Start: 2019-05-25 | End: 2019-05-29 | Stop reason: HOSPADM

## 2019-05-25 RX ORDER — ASPIRIN 81 MG/1
81 TABLET, CHEWABLE ORAL DAILY
Status: DISCONTINUED | OUTPATIENT
Start: 2019-05-25 | End: 2019-05-29 | Stop reason: HOSPADM

## 2019-05-25 RX ORDER — ATORVASTATIN CALCIUM 20 MG/1
20 TABLET, FILM COATED ORAL NIGHTLY
Status: DISCONTINUED | OUTPATIENT
Start: 2019-05-25 | End: 2019-05-25

## 2019-05-25 RX ORDER — QUETIAPINE FUMARATE 25 MG/1
25 TABLET, FILM COATED ORAL NIGHTLY
COMMUNITY
End: 2019-05-29 | Stop reason: HOSPADM

## 2019-05-25 RX ORDER — LEVOTHYROXINE SODIUM 0.05 MG/1
50 TABLET ORAL
Status: DISCONTINUED | OUTPATIENT
Start: 2019-05-25 | End: 2019-05-26

## 2019-05-25 RX ORDER — PANTOPRAZOLE SODIUM 40 MG/1
40 TABLET, DELAYED RELEASE ORAL
Status: DISCONTINUED | OUTPATIENT
Start: 2019-05-26 | End: 2019-05-29 | Stop reason: HOSPADM

## 2019-05-25 RX ORDER — HYDROMORPHONE HYDROCHLORIDE 1 MG/ML
0.5 INJECTION, SOLUTION INTRAMUSCULAR; INTRAVENOUS; SUBCUTANEOUS 4 TIMES DAILY PRN
Status: DISCONTINUED | OUTPATIENT
Start: 2019-05-25 | End: 2019-05-28

## 2019-05-25 RX ORDER — SODIUM CHLORIDE 450 MG/100ML
50 INJECTION, SOLUTION INTRAVENOUS CONTINUOUS
Status: DISCONTINUED | OUTPATIENT
Start: 2019-05-25 | End: 2019-05-28

## 2019-05-25 RX ORDER — ATORVASTATIN CALCIUM 10 MG/1
10 TABLET, FILM COATED ORAL NIGHTLY
Status: DISCONTINUED | OUTPATIENT
Start: 2019-05-25 | End: 2019-05-29 | Stop reason: HOSPADM

## 2019-05-25 RX ORDER — PAROXETINE HYDROCHLORIDE 20 MG/1
20 TABLET, FILM COATED ORAL DAILY
Status: DISCONTINUED | OUTPATIENT
Start: 2019-05-25 | End: 2019-05-29 | Stop reason: HOSPADM

## 2019-05-25 RX ORDER — ONDANSETRON 2 MG/ML
4 INJECTION INTRAMUSCULAR; INTRAVENOUS EVERY 6 HOURS PRN
Status: DISCONTINUED | OUTPATIENT
Start: 2019-05-25 | End: 2019-05-28

## 2019-05-25 RX ORDER — DOCUSATE SODIUM 100 MG/1
100 CAPSULE, LIQUID FILLED ORAL DAILY
Status: DISCONTINUED | OUTPATIENT
Start: 2019-05-25 | End: 2019-05-27

## 2019-05-25 RX ADMIN — APIXABAN 2.5 MG: 2.5 TABLET, FILM COATED ORAL at 09:59

## 2019-05-25 RX ADMIN — LORAZEPAM 0.5 MG: 0.5 TABLET ORAL at 20:36

## 2019-05-25 RX ADMIN — SODIUM CHLORIDE 75 ML/HR: 4.5 INJECTION, SOLUTION INTRAVENOUS at 13:40

## 2019-05-25 RX ADMIN — ASPIRIN 81 MG: 81 TABLET, CHEWABLE ORAL at 09:58

## 2019-05-25 RX ADMIN — LEVOTHYROXINE SODIUM 50 MCG: 50 TABLET ORAL at 09:59

## 2019-05-25 RX ADMIN — PAROXETINE 20 MG: 20 TABLET, FILM COATED ORAL at 13:40

## 2019-05-25 RX ADMIN — CARVEDILOL 3.12 MG: 3.12 TABLET, FILM COATED ORAL at 20:37

## 2019-05-25 RX ADMIN — HYDROMORPHONE HYDROCHLORIDE 0.5 MG: 1 INJECTION, SOLUTION INTRAMUSCULAR; INTRAVENOUS; SUBCUTANEOUS at 20:47

## 2019-05-25 RX ADMIN — SODIUM CHLORIDE 500 ML: 9 INJECTION, SOLUTION INTRAVENOUS at 02:11

## 2019-05-25 RX ADMIN — DONEPEZIL HYDROCHLORIDE 10 MG: 10 TABLET, FILM COATED ORAL at 20:36

## 2019-05-25 RX ADMIN — CARVEDILOL 3.12 MG: 3.12 TABLET, FILM COATED ORAL at 09:59

## 2019-05-25 RX ADMIN — DOCUSATE SODIUM 100 MG: 100 CAPSULE, LIQUID FILLED ORAL at 13:40

## 2019-05-26 LAB
ALBUMIN SERPL-MCNC: 2.9 G/DL (ref 3.5–5.2)
ALBUMIN/GLOB SERPL: 0.9 G/DL
ALP SERPL-CCNC: 70 U/L (ref 39–117)
ALT SERPL W P-5'-P-CCNC: 14 U/L (ref 1–33)
ANION GAP SERPL CALCULATED.3IONS-SCNC: 13.1 MMOL/L
AST SERPL-CCNC: 21 U/L (ref 1–32)
BASOPHILS # BLD AUTO: 0.11 10*3/MM3 (ref 0–0.2)
BASOPHILS NFR BLD AUTO: 0.9 % (ref 0–1.5)
BILIRUB SERPL-MCNC: 0.5 MG/DL (ref 0.2–1.2)
BUN BLD-MCNC: 16 MG/DL (ref 8–23)
BUN/CREAT SERPL: 21.9 (ref 7–25)
CALCIUM SPEC-SCNC: 8.2 MG/DL (ref 8.2–9.6)
CHLORIDE SERPL-SCNC: 102 MMOL/L (ref 98–107)
CHOLEST SERPL-MCNC: 112 MG/DL (ref 0–200)
CK SERPL-CCNC: 172 U/L (ref 20–180)
CO2 SERPL-SCNC: 25.9 MMOL/L (ref 22–29)
CREAT BLD-MCNC: 0.73 MG/DL (ref 0.57–1)
DEPRECATED RDW RBC AUTO: 62.4 FL (ref 37–54)
EOSINOPHIL # BLD AUTO: 0.37 10*3/MM3 (ref 0–0.4)
EOSINOPHIL NFR BLD AUTO: 3.2 % (ref 0.3–6.2)
ERYTHROCYTE [DISTWIDTH] IN BLOOD BY AUTOMATED COUNT: 17.4 % (ref 12.3–15.4)
GFR SERPL CREATININE-BSD FRML MDRD: 75 ML/MIN/1.73
GLOBULIN UR ELPH-MCNC: 3.2 GM/DL
GLUCOSE BLD-MCNC: 120 MG/DL (ref 65–99)
HBA1C MFR BLD: 5.6 % (ref 4.8–5.6)
HCT VFR BLD AUTO: 37.4 % (ref 34–46.6)
HDLC SERPL-MCNC: 45 MG/DL (ref 40–60)
HGB BLD-MCNC: 11.5 G/DL (ref 12–15.9)
IMM GRANULOCYTES # BLD AUTO: 0.04 10*3/MM3 (ref 0–0.05)
IMM GRANULOCYTES NFR BLD AUTO: 0.3 % (ref 0–0.5)
LDLC SERPL CALC-MCNC: 52 MG/DL (ref 0–100)
LDLC/HDLC SERPL: 1.15 {RATIO}
LYMPHOCYTES # BLD AUTO: 2.37 10*3/MM3 (ref 0.7–3.1)
LYMPHOCYTES NFR BLD AUTO: 20.4 % (ref 19.6–45.3)
MCH RBC QN AUTO: 29.9 PG (ref 26.6–33)
MCHC RBC AUTO-ENTMCNC: 30.7 G/DL (ref 31.5–35.7)
MCV RBC AUTO: 97.4 FL (ref 79–97)
MONOCYTES # BLD AUTO: 1.05 10*3/MM3 (ref 0.1–0.9)
MONOCYTES NFR BLD AUTO: 9.1 % (ref 5–12)
NEUTROPHILS # BLD AUTO: 7.65 10*3/MM3 (ref 1.7–7)
NEUTROPHILS NFR BLD AUTO: 66.1 % (ref 42.7–76)
NRBC BLD AUTO-RTO: 0 /100 WBC (ref 0–0.2)
NT-PROBNP SERPL-MCNC: 631.1 PG/ML (ref 5–1800)
PLATELET # BLD AUTO: 200 10*3/MM3 (ref 140–450)
PMV BLD AUTO: 11.1 FL (ref 6–12)
POTASSIUM BLD-SCNC: 4.1 MMOL/L (ref 3.5–5.2)
PROT SERPL-MCNC: 6.1 G/DL (ref 6–8.5)
RBC # BLD AUTO: 3.84 10*6/MM3 (ref 3.77–5.28)
SODIUM BLD-SCNC: 141 MMOL/L (ref 136–145)
T4 FREE SERPL-MCNC: 0.62 NG/DL (ref 0.93–1.7)
TRIGL SERPL-MCNC: 76 MG/DL (ref 0–150)
TSH SERPL DL<=0.05 MIU/L-ACNC: 10.8 MIU/ML (ref 0.27–4.2)
VLDLC SERPL-MCNC: 15.2 MG/DL (ref 5–40)
WBC NRBC COR # BLD: 11.59 10*3/MM3 (ref 3.4–10.8)

## 2019-05-26 PROCEDURE — 82550 ASSAY OF CK (CPK): CPT | Performed by: HOSPITALIST

## 2019-05-26 PROCEDURE — 83880 ASSAY OF NATRIURETIC PEPTIDE: CPT | Performed by: HOSPITALIST

## 2019-05-26 PROCEDURE — 99231 SBSQ HOSP IP/OBS SF/LOW 25: CPT | Performed by: NURSE PRACTITIONER

## 2019-05-26 PROCEDURE — 25010000002 HYDROMORPHONE PER 4 MG: Performed by: HOSPITALIST

## 2019-05-26 PROCEDURE — 84439 ASSAY OF FREE THYROXINE: CPT | Performed by: HOSPITALIST

## 2019-05-26 PROCEDURE — 84443 ASSAY THYROID STIM HORMONE: CPT | Performed by: HOSPITALIST

## 2019-05-26 PROCEDURE — 80053 COMPREHEN METABOLIC PANEL: CPT | Performed by: HOSPITALIST

## 2019-05-26 PROCEDURE — 83036 HEMOGLOBIN GLYCOSYLATED A1C: CPT | Performed by: HOSPITALIST

## 2019-05-26 PROCEDURE — 80061 LIPID PANEL: CPT | Performed by: HOSPITALIST

## 2019-05-26 PROCEDURE — 25010000002 ONDANSETRON PER 1 MG: Performed by: HOSPITALIST

## 2019-05-26 PROCEDURE — 85025 COMPLETE CBC W/AUTO DIFF WBC: CPT | Performed by: HOSPITALIST

## 2019-05-26 RX ORDER — LEVOTHYROXINE SODIUM 0.07 MG/1
75 TABLET ORAL
Status: DISCONTINUED | OUTPATIENT
Start: 2019-05-27 | End: 2019-05-29 | Stop reason: HOSPADM

## 2019-05-26 RX ORDER — IPRATROPIUM BROMIDE AND ALBUTEROL SULFATE 2.5; .5 MG/3ML; MG/3ML
3 SOLUTION RESPIRATORY (INHALATION) EVERY 4 HOURS PRN
Status: DISCONTINUED | OUTPATIENT
Start: 2019-05-26 | End: 2019-05-29

## 2019-05-26 RX ORDER — IPRATROPIUM BROMIDE AND ALBUTEROL SULFATE 2.5; .5 MG/3ML; MG/3ML
3 SOLUTION RESPIRATORY (INHALATION)
Status: DISCONTINUED | OUTPATIENT
Start: 2019-05-26 | End: 2019-05-26

## 2019-05-26 RX ADMIN — DOCUSATE SODIUM 100 MG: 100 CAPSULE, LIQUID FILLED ORAL at 10:39

## 2019-05-26 RX ADMIN — CARVEDILOL 3.12 MG: 3.12 TABLET, FILM COATED ORAL at 10:39

## 2019-05-26 RX ADMIN — HYDROMORPHONE HYDROCHLORIDE 0.5 MG: 1 INJECTION, SOLUTION INTRAMUSCULAR; INTRAVENOUS; SUBCUTANEOUS at 21:24

## 2019-05-26 RX ADMIN — CARVEDILOL 3.12 MG: 3.12 TABLET, FILM COATED ORAL at 21:25

## 2019-05-26 RX ADMIN — ATORVASTATIN CALCIUM 10 MG: 10 TABLET, FILM COATED ORAL at 21:25

## 2019-05-26 RX ADMIN — SODIUM CHLORIDE 50 ML/HR: 4.5 INJECTION, SOLUTION INTRAVENOUS at 21:24

## 2019-05-26 RX ADMIN — SODIUM CHLORIDE 75 ML/HR: 4.5 INJECTION, SOLUTION INTRAVENOUS at 02:28

## 2019-05-26 RX ADMIN — DONEPEZIL HYDROCHLORIDE 10 MG: 10 TABLET, FILM COATED ORAL at 21:24

## 2019-05-26 RX ADMIN — PAROXETINE 20 MG: 20 TABLET, FILM COATED ORAL at 10:39

## 2019-05-26 RX ADMIN — ONDANSETRON HYDROCHLORIDE 4 MG: 2 SOLUTION INTRAMUSCULAR; INTRAVENOUS at 21:24

## 2019-05-26 RX ADMIN — HYDROMORPHONE HYDROCHLORIDE 0.5 MG: 1 INJECTION, SOLUTION INTRAMUSCULAR; INTRAVENOUS; SUBCUTANEOUS at 02:41

## 2019-05-26 RX ADMIN — HYDROMORPHONE HYDROCHLORIDE 0.5 MG: 1 INJECTION, SOLUTION INTRAMUSCULAR; INTRAVENOUS; SUBCUTANEOUS at 14:28

## 2019-05-26 RX ADMIN — LORAZEPAM 0.5 MG: 0.5 TABLET ORAL at 21:24

## 2019-05-27 ENCOUNTER — APPOINTMENT (OUTPATIENT)
Dept: GENERAL RADIOLOGY | Facility: HOSPITAL | Age: 84
End: 2019-05-27

## 2019-05-27 ENCOUNTER — ANESTHESIA EVENT (OUTPATIENT)
Dept: PERIOP | Facility: HOSPITAL | Age: 84
End: 2019-05-27

## 2019-05-27 ENCOUNTER — ANESTHESIA (OUTPATIENT)
Dept: PERIOP | Facility: HOSPITAL | Age: 84
End: 2019-05-27

## 2019-05-27 LAB
ANION GAP SERPL CALCULATED.3IONS-SCNC: 11.2 MMOL/L
BASOPHILS # BLD AUTO: 0.09 10*3/MM3 (ref 0–0.2)
BASOPHILS NFR BLD AUTO: 0.9 % (ref 0–1.5)
BUN BLD-MCNC: 12 MG/DL (ref 8–23)
BUN/CREAT SERPL: 17.4 (ref 7–25)
CALCIUM SPEC-SCNC: 8.3 MG/DL (ref 8.2–9.6)
CHLORIDE SERPL-SCNC: 101 MMOL/L (ref 98–107)
CO2 SERPL-SCNC: 26.8 MMOL/L (ref 22–29)
CREAT BLD-MCNC: 0.69 MG/DL (ref 0.57–1)
DEPRECATED RDW RBC AUTO: 66.5 FL (ref 37–54)
EOSINOPHIL # BLD AUTO: 0.36 10*3/MM3 (ref 0–0.4)
EOSINOPHIL NFR BLD AUTO: 3.6 % (ref 0.3–6.2)
ERYTHROCYTE [DISTWIDTH] IN BLOOD BY AUTOMATED COUNT: 17.4 % (ref 12.3–15.4)
GFR SERPL CREATININE-BSD FRML MDRD: 80 ML/MIN/1.73
GLUCOSE BLD-MCNC: 109 MG/DL (ref 65–99)
HCT VFR BLD AUTO: 36.4 % (ref 34–46.6)
HGB BLD-MCNC: 10.5 G/DL (ref 12–15.9)
IMM GRANULOCYTES # BLD AUTO: 0.05 10*3/MM3 (ref 0–0.05)
IMM GRANULOCYTES NFR BLD AUTO: 0.5 % (ref 0–0.5)
LYMPHOCYTES # BLD AUTO: 2.24 10*3/MM3 (ref 0.7–3.1)
LYMPHOCYTES NFR BLD AUTO: 22.1 % (ref 19.6–45.3)
MCH RBC QN AUTO: 29.6 PG (ref 26.6–33)
MCHC RBC AUTO-ENTMCNC: 28.8 G/DL (ref 31.5–35.7)
MCV RBC AUTO: 102.5 FL (ref 79–97)
MONOCYTES # BLD AUTO: 1.21 10*3/MM3 (ref 0.1–0.9)
MONOCYTES NFR BLD AUTO: 12 % (ref 5–12)
NEUTROPHILS # BLD AUTO: 6.17 10*3/MM3 (ref 1.7–7)
NEUTROPHILS NFR BLD AUTO: 60.9 % (ref 42.7–76)
NRBC BLD AUTO-RTO: 0 /100 WBC (ref 0–0.2)
PLATELET # BLD AUTO: 217 10*3/MM3 (ref 140–450)
PMV BLD AUTO: 10.8 FL (ref 6–12)
POTASSIUM BLD-SCNC: 4.1 MMOL/L (ref 3.5–5.2)
RBC # BLD AUTO: 3.55 10*6/MM3 (ref 3.77–5.28)
SODIUM BLD-SCNC: 139 MMOL/L (ref 136–145)
WBC NRBC COR # BLD: 10.12 10*3/MM3 (ref 3.4–10.8)

## 2019-05-27 PROCEDURE — 73560 X-RAY EXAM OF KNEE 1 OR 2: CPT

## 2019-05-27 PROCEDURE — 27524 TREAT KNEECAP FRACTURE: CPT | Performed by: NURSE PRACTITIONER

## 2019-05-27 PROCEDURE — C1713 ANCHOR/SCREW BN/BN,TIS/BN: HCPCS | Performed by: ORTHOPAEDIC SURGERY

## 2019-05-27 PROCEDURE — 94640 AIRWAY INHALATION TREATMENT: CPT

## 2019-05-27 PROCEDURE — 25010000003 CEFAZOLIN IN DEXTROSE 2-4 GM/100ML-% SOLUTION: Performed by: ORTHOPAEDIC SURGERY

## 2019-05-27 PROCEDURE — 85025 COMPLETE CBC W/AUTO DIFF WBC: CPT | Performed by: HOSPITALIST

## 2019-05-27 PROCEDURE — 25010000002 FENTANYL CITRATE (PF) 100 MCG/2ML SOLUTION: Performed by: NURSE ANESTHETIST, CERTIFIED REGISTERED

## 2019-05-27 PROCEDURE — 80048 BASIC METABOLIC PNL TOTAL CA: CPT | Performed by: HOSPITALIST

## 2019-05-27 PROCEDURE — 94799 UNLISTED PULMONARY SVC/PX: CPT

## 2019-05-27 PROCEDURE — 76000 FLUOROSCOPY <1 HR PHYS/QHP: CPT

## 2019-05-27 PROCEDURE — L1830 KO IMMOB CANVAS LONG PRE OTS: HCPCS | Performed by: ORTHOPAEDIC SURGERY

## 2019-05-27 PROCEDURE — 25010000002 DEXAMETHASONE PER 1 MG: Performed by: NURSE ANESTHETIST, CERTIFIED REGISTERED

## 2019-05-27 PROCEDURE — 27524 TREAT KNEECAP FRACTURE: CPT | Performed by: ORTHOPAEDIC SURGERY

## 2019-05-27 PROCEDURE — 0QSF04Z REPOSITION LEFT PATELLA WITH INTERNAL FIXATION DEVICE, OPEN APPROACH: ICD-10-PCS | Performed by: ORTHOPAEDIC SURGERY

## 2019-05-27 RX ORDER — HYDROCODONE BITARTRATE AND ACETAMINOPHEN 7.5; 325 MG/1; MG/1
2 TABLET ORAL EVERY 4 HOURS PRN
Status: DISCONTINUED | OUTPATIENT
Start: 2019-05-27 | End: 2019-05-29

## 2019-05-27 RX ORDER — FAMOTIDINE 10 MG/ML
20 INJECTION, SOLUTION INTRAVENOUS ONCE
Status: COMPLETED | OUTPATIENT
Start: 2019-05-27 | End: 2019-05-27

## 2019-05-27 RX ORDER — ONDANSETRON 4 MG/1
4 TABLET, FILM COATED ORAL EVERY 6 HOURS PRN
Status: DISCONTINUED | OUTPATIENT
Start: 2019-05-27 | End: 2019-05-28

## 2019-05-27 RX ORDER — CEFAZOLIN SODIUM 2 G/100ML
2 INJECTION, SOLUTION INTRAVENOUS EVERY 8 HOURS
Status: COMPLETED | OUTPATIENT
Start: 2019-05-27 | End: 2019-05-27

## 2019-05-27 RX ORDER — DEXAMETHASONE SODIUM PHOSPHATE 4 MG/ML
INJECTION, SOLUTION INTRA-ARTICULAR; INTRALESIONAL; INTRAMUSCULAR; INTRAVENOUS; SOFT TISSUE AS NEEDED
Status: DISCONTINUED | OUTPATIENT
Start: 2019-05-27 | End: 2019-05-27 | Stop reason: SURG

## 2019-05-27 RX ORDER — PROMETHAZINE HYDROCHLORIDE 25 MG/1
25 TABLET ORAL ONCE AS NEEDED
Status: DISCONTINUED | OUTPATIENT
Start: 2019-05-27 | End: 2019-05-27 | Stop reason: HOSPADM

## 2019-05-27 RX ORDER — FLUMAZENIL 0.1 MG/ML
0.2 INJECTION INTRAVENOUS AS NEEDED
Status: DISCONTINUED | OUTPATIENT
Start: 2019-05-27 | End: 2019-05-27 | Stop reason: HOSPADM

## 2019-05-27 RX ORDER — DOCUSATE SODIUM 100 MG/1
100 CAPSULE, LIQUID FILLED ORAL 2 TIMES DAILY
Status: DISCONTINUED | OUTPATIENT
Start: 2019-05-27 | End: 2019-05-29 | Stop reason: HOSPADM

## 2019-05-27 RX ORDER — MIDAZOLAM HYDROCHLORIDE 1 MG/ML
2 INJECTION INTRAMUSCULAR; INTRAVENOUS
Status: DISCONTINUED | OUTPATIENT
Start: 2019-05-27 | End: 2019-05-27 | Stop reason: HOSPADM

## 2019-05-27 RX ORDER — HYDRALAZINE HYDROCHLORIDE 20 MG/ML
5 INJECTION INTRAMUSCULAR; INTRAVENOUS
Status: DISCONTINUED | OUTPATIENT
Start: 2019-05-27 | End: 2019-05-27 | Stop reason: HOSPADM

## 2019-05-27 RX ORDER — SODIUM CHLORIDE, SODIUM LACTATE, POTASSIUM CHLORIDE, CALCIUM CHLORIDE 600; 310; 30; 20 MG/100ML; MG/100ML; MG/100ML; MG/100ML
9 INJECTION, SOLUTION INTRAVENOUS CONTINUOUS
Status: DISCONTINUED | OUTPATIENT
Start: 2019-05-27 | End: 2019-05-28

## 2019-05-27 RX ORDER — BUPIVACAINE HYDROCHLORIDE 7.5 MG/ML
INJECTION, SOLUTION EPIDURAL; RETROBULBAR
Status: DISCONTINUED | OUTPATIENT
Start: 2019-05-27 | End: 2019-05-27 | Stop reason: SURG

## 2019-05-27 RX ORDER — CEFAZOLIN SODIUM 2 G/100ML
2 INJECTION, SOLUTION INTRAVENOUS ONCE
Status: COMPLETED | OUTPATIENT
Start: 2019-05-27 | End: 2019-05-27

## 2019-05-27 RX ORDER — FENTANYL CITRATE 50 UG/ML
50 INJECTION, SOLUTION INTRAMUSCULAR; INTRAVENOUS
Status: DISCONTINUED | OUTPATIENT
Start: 2019-05-27 | End: 2019-05-27 | Stop reason: HOSPADM

## 2019-05-27 RX ORDER — FENTANYL CITRATE 50 UG/ML
25 INJECTION, SOLUTION INTRAMUSCULAR; INTRAVENOUS
Status: DISCONTINUED | OUTPATIENT
Start: 2019-05-27 | End: 2019-05-27 | Stop reason: HOSPADM

## 2019-05-27 RX ORDER — ONDANSETRON 2 MG/ML
4 INJECTION INTRAMUSCULAR; INTRAVENOUS ONCE AS NEEDED
Status: DISCONTINUED | OUTPATIENT
Start: 2019-05-27 | End: 2019-05-27 | Stop reason: HOSPADM

## 2019-05-27 RX ORDER — PROMETHAZINE HYDROCHLORIDE 25 MG/ML
12.5 INJECTION, SOLUTION INTRAMUSCULAR; INTRAVENOUS ONCE AS NEEDED
Status: DISCONTINUED | OUTPATIENT
Start: 2019-05-27 | End: 2019-05-27 | Stop reason: HOSPADM

## 2019-05-27 RX ORDER — FENTANYL CITRATE 50 UG/ML
INJECTION, SOLUTION INTRAMUSCULAR; INTRAVENOUS AS NEEDED
Status: DISCONTINUED | OUTPATIENT
Start: 2019-05-27 | End: 2019-05-27 | Stop reason: SURG

## 2019-05-27 RX ORDER — DIPHENHYDRAMINE HYDROCHLORIDE 50 MG/ML
12.5 INJECTION INTRAMUSCULAR; INTRAVENOUS
Status: DISCONTINUED | OUTPATIENT
Start: 2019-05-27 | End: 2019-05-27 | Stop reason: HOSPADM

## 2019-05-27 RX ORDER — ONDANSETRON 2 MG/ML
4 INJECTION INTRAMUSCULAR; INTRAVENOUS EVERY 6 HOURS PRN
Status: DISCONTINUED | OUTPATIENT
Start: 2019-05-27 | End: 2019-05-29

## 2019-05-27 RX ORDER — IPRATROPIUM BROMIDE AND ALBUTEROL SULFATE 2.5; .5 MG/3ML; MG/3ML
3 SOLUTION RESPIRATORY (INHALATION) ONCE
Status: COMPLETED | OUTPATIENT
Start: 2019-05-27 | End: 2019-05-27

## 2019-05-27 RX ORDER — NALOXONE HCL 0.4 MG/ML
0.1 VIAL (ML) INJECTION
Status: DISCONTINUED | OUTPATIENT
Start: 2019-05-27 | End: 2019-05-28

## 2019-05-27 RX ORDER — MIDAZOLAM HYDROCHLORIDE 1 MG/ML
1 INJECTION INTRAMUSCULAR; INTRAVENOUS
Status: DISCONTINUED | OUTPATIENT
Start: 2019-05-27 | End: 2019-05-27 | Stop reason: HOSPADM

## 2019-05-27 RX ORDER — NALOXONE HCL 0.4 MG/ML
0.2 VIAL (ML) INJECTION AS NEEDED
Status: DISCONTINUED | OUTPATIENT
Start: 2019-05-27 | End: 2019-05-27 | Stop reason: HOSPADM

## 2019-05-27 RX ORDER — PROMETHAZINE HYDROCHLORIDE 25 MG/ML
6.25 INJECTION, SOLUTION INTRAMUSCULAR; INTRAVENOUS
Status: DISCONTINUED | OUTPATIENT
Start: 2019-05-27 | End: 2019-05-27 | Stop reason: HOSPADM

## 2019-05-27 RX ORDER — EPHEDRINE SULFATE 50 MG/ML
INJECTION, SOLUTION INTRAVENOUS AS NEEDED
Status: DISCONTINUED | OUTPATIENT
Start: 2019-05-27 | End: 2019-05-27 | Stop reason: SURG

## 2019-05-27 RX ORDER — HYDROCODONE BITARTRATE AND ACETAMINOPHEN 7.5; 325 MG/1; MG/1
1 TABLET ORAL EVERY 4 HOURS PRN
Status: DISCONTINUED | OUTPATIENT
Start: 2019-05-27 | End: 2019-05-29 | Stop reason: HOSPADM

## 2019-05-27 RX ORDER — PROMETHAZINE HYDROCHLORIDE 25 MG/1
25 SUPPOSITORY RECTAL ONCE AS NEEDED
Status: DISCONTINUED | OUTPATIENT
Start: 2019-05-27 | End: 2019-05-27 | Stop reason: HOSPADM

## 2019-05-27 RX ORDER — LABETALOL HYDROCHLORIDE 5 MG/ML
5 INJECTION, SOLUTION INTRAVENOUS
Status: DISCONTINUED | OUTPATIENT
Start: 2019-05-27 | End: 2019-05-27 | Stop reason: HOSPADM

## 2019-05-27 RX ORDER — LIDOCAINE HYDROCHLORIDE 10 MG/ML
0.5 INJECTION, SOLUTION EPIDURAL; INFILTRATION; INTRACAUDAL; PERINEURAL ONCE AS NEEDED
Status: DISCONTINUED | OUTPATIENT
Start: 2019-05-27 | End: 2019-05-27 | Stop reason: HOSPADM

## 2019-05-27 RX ORDER — ACETAMINOPHEN 325 MG/1
650 TABLET ORAL ONCE AS NEEDED
Status: DISCONTINUED | OUTPATIENT
Start: 2019-05-27 | End: 2019-05-27 | Stop reason: HOSPADM

## 2019-05-27 RX ORDER — FENTANYL CITRATE 50 UG/ML
INJECTION, SOLUTION INTRAMUSCULAR; INTRAVENOUS
Status: COMPLETED
Start: 2019-05-27 | End: 2019-05-27

## 2019-05-27 RX ORDER — HYDROCODONE BITARTRATE AND ACETAMINOPHEN 7.5; 325 MG/1; MG/1
1 TABLET ORAL ONCE AS NEEDED
Status: DISCONTINUED | OUTPATIENT
Start: 2019-05-27 | End: 2019-05-27 | Stop reason: HOSPADM

## 2019-05-27 RX ORDER — LORAZEPAM 0.5 MG/1
0.5 TABLET ORAL EVERY 6 HOURS PRN
Status: DISCONTINUED | OUTPATIENT
Start: 2019-05-27 | End: 2019-05-28

## 2019-05-27 RX ORDER — DIPHENHYDRAMINE HCL 25 MG
25 CAPSULE ORAL
Status: DISCONTINUED | OUTPATIENT
Start: 2019-05-27 | End: 2019-05-27 | Stop reason: HOSPADM

## 2019-05-27 RX ORDER — LORAZEPAM 2 MG/ML
0.5 INJECTION INTRAMUSCULAR EVERY 4 HOURS PRN
Status: DISCONTINUED | OUTPATIENT
Start: 2019-05-27 | End: 2019-05-29

## 2019-05-27 RX ORDER — OXYCODONE AND ACETAMINOPHEN 7.5; 325 MG/1; MG/1
1 TABLET ORAL ONCE AS NEEDED
Status: DISCONTINUED | OUTPATIENT
Start: 2019-05-27 | End: 2019-05-27 | Stop reason: HOSPADM

## 2019-05-27 RX ORDER — SODIUM CHLORIDE, SODIUM LACTATE, POTASSIUM CHLORIDE, CALCIUM CHLORIDE 600; 310; 30; 20 MG/100ML; MG/100ML; MG/100ML; MG/100ML
100 INJECTION, SOLUTION INTRAVENOUS CONTINUOUS
Status: DISCONTINUED | OUTPATIENT
Start: 2019-05-27 | End: 2019-05-28

## 2019-05-27 RX ORDER — HYDROMORPHONE HYDROCHLORIDE 1 MG/ML
0.5 INJECTION, SOLUTION INTRAMUSCULAR; INTRAVENOUS; SUBCUTANEOUS
Status: DISCONTINUED | OUTPATIENT
Start: 2019-05-27 | End: 2019-05-28

## 2019-05-27 RX ORDER — SODIUM CHLORIDE 0.9 % (FLUSH) 0.9 %
1-10 SYRINGE (ML) INJECTION AS NEEDED
Status: DISCONTINUED | OUTPATIENT
Start: 2019-05-27 | End: 2019-05-27 | Stop reason: HOSPADM

## 2019-05-27 RX ORDER — EPHEDRINE SULFATE 50 MG/ML
5 INJECTION, SOLUTION INTRAVENOUS ONCE AS NEEDED
Status: DISCONTINUED | OUTPATIENT
Start: 2019-05-27 | End: 2019-05-27 | Stop reason: HOSPADM

## 2019-05-27 RX ADMIN — DOCUSATE SODIUM 100 MG: 100 CAPSULE, LIQUID FILLED ORAL at 20:00

## 2019-05-27 RX ADMIN — IPRATROPIUM BROMIDE AND ALBUTEROL SULFATE 3 ML: .5; 3 SOLUTION RESPIRATORY (INHALATION) at 07:05

## 2019-05-27 RX ADMIN — CARVEDILOL 3.12 MG: 3.12 TABLET, FILM COATED ORAL at 06:31

## 2019-05-27 RX ADMIN — EPHEDRINE SULFATE 5 MG: 50 INJECTION INTRAMUSCULAR; INTRAVENOUS; SUBCUTANEOUS at 08:48

## 2019-05-27 RX ADMIN — EPHEDRINE SULFATE 5 MG: 50 INJECTION INTRAMUSCULAR; INTRAVENOUS; SUBCUTANEOUS at 09:10

## 2019-05-27 RX ADMIN — LORAZEPAM 0.5 MG: 0.5 TABLET ORAL at 20:01

## 2019-05-27 RX ADMIN — ATORVASTATIN CALCIUM 10 MG: 10 TABLET, FILM COATED ORAL at 20:00

## 2019-05-27 RX ADMIN — CEFAZOLIN SODIUM 2 G: 2 INJECTION, SOLUTION INTRAVENOUS at 07:33

## 2019-05-27 RX ADMIN — CEFAZOLIN SODIUM 2 G: 2 INJECTION, SOLUTION INTRAVENOUS at 14:40

## 2019-05-27 RX ADMIN — SODIUM CHLORIDE, POTASSIUM CHLORIDE, SODIUM LACTATE AND CALCIUM CHLORIDE: 600; 310; 30; 20 INJECTION, SOLUTION INTRAVENOUS at 07:32

## 2019-05-27 RX ADMIN — FENTANYL CITRATE 25 MCG: 50 INJECTION INTRAMUSCULAR; INTRAVENOUS at 07:42

## 2019-05-27 RX ADMIN — EPHEDRINE SULFATE 5 MG: 50 INJECTION INTRAMUSCULAR; INTRAVENOUS; SUBCUTANEOUS at 09:04

## 2019-05-27 RX ADMIN — CEFAZOLIN SODIUM 2 G: 2 INJECTION, SOLUTION INTRAVENOUS at 23:17

## 2019-05-27 RX ADMIN — SODIUM CHLORIDE, POTASSIUM CHLORIDE, SODIUM LACTATE AND CALCIUM CHLORIDE 9 ML/HR: 600; 310; 30; 20 INJECTION, SOLUTION INTRAVENOUS at 07:18

## 2019-05-27 RX ADMIN — FAMOTIDINE 20 MG: 10 INJECTION INTRAVENOUS at 07:18

## 2019-05-27 RX ADMIN — FENTANYL CITRATE 25 MCG: 50 INJECTION INTRAMUSCULAR; INTRAVENOUS at 07:36

## 2019-05-27 RX ADMIN — HYDROCODONE BITARTRATE AND ACETAMINOPHEN 1 TABLET: 7.5; 325 TABLET ORAL at 20:01

## 2019-05-27 RX ADMIN — BUPIVACAINE HYDROCHLORIDE 1.8 ML: 7.5 INJECTION, SOLUTION EPIDURAL; RETROBULBAR at 07:51

## 2019-05-27 RX ADMIN — EPHEDRINE SULFATE 5 MG: 50 INJECTION INTRAMUSCULAR; INTRAVENOUS; SUBCUTANEOUS at 07:57

## 2019-05-27 RX ADMIN — DEXAMETHASONE SODIUM PHOSPHATE 8 MG: 4 INJECTION INTRA-ARTICULAR; INTRALESIONAL; INTRAMUSCULAR; INTRAVENOUS; SOFT TISSUE at 08:57

## 2019-05-27 RX ADMIN — DONEPEZIL HYDROCHLORIDE 10 MG: 10 TABLET, FILM COATED ORAL at 20:00

## 2019-05-27 RX ADMIN — CARVEDILOL 3.12 MG: 3.12 TABLET, FILM COATED ORAL at 20:00

## 2019-05-27 RX ADMIN — HYDROCODONE BITARTRATE AND ACETAMINOPHEN 1 TABLET: 7.5; 325 TABLET ORAL at 13:13

## 2019-05-27 RX ADMIN — EPHEDRINE SULFATE 5 MG: 50 INJECTION INTRAMUSCULAR; INTRAVENOUS; SUBCUTANEOUS at 08:05

## 2019-05-27 NOTE — ANESTHESIA PREPROCEDURE EVALUATION
Anesthesia Evaluation     Patient summary reviewed   no history of anesthetic complications:  NPO Solid Status: > 8 hours  NPO Liquid Status: > 2 hours           Airway   Mallampati: II  TM distance: >3 FB  Neck ROM: full  Dental      Pulmonary     breath sounds clear to auscultation  (-) shortness of breath, not a smoker  Cardiovascular   Exercise tolerance: good (4-7 METS)    ECG reviewed  Rate: normal    (+) PVD,   (-) angina, LEOS    ROS comment: EKG: Sinus tachycardia  Nonspecific T abnormalities, lateral leads  af resolved    Neuro/Psych  (+) CVA residual symptoms, psychiatric history Depression,     GI/Hepatic/Renal/Endo      Musculoskeletal     Abdominal    Substance History      OB/GYN          Other   (+) arthritis                     Anesthesia Plan    ASA 3     spinal     Anesthetic plan, all risks, benefits, and alternatives have been provided, discussed and informed consent has been obtained with: patient and child.

## 2019-05-27 NOTE — ANESTHESIA POSTPROCEDURE EVALUATION
"Patient: Christianne Ordonez    Procedure Summary     Date:  05/27/19 Room / Location:  [facility] OR [address] [place] [address] MAIN OR    Anesthesia Start:  0732 Anesthesia Stop:  0916    Procedure:  PATELLA OPEN REDUCTION INTERNAL FIXATION (Left Knee) Diagnosis:       Closed displaced transverse fracture of left patella, initial encounter      (Closed displaced transverse fracture of left patella, initial encounter [S82.032A])    Surgeon:  Oleg David MD Provider:  Jae Wheeler MD    Anesthesia Type:  spinal ASA Status:  3          Anesthesia Type: spinal  Last vitals  BP   117/54 (05/27/19 0930)   Temp   37.2 °C (98.9 °F) (05/27/19 0912)   Pulse   63 (05/27/19 0930)   Resp   16 (05/27/19 0930)     SpO2   95 % (05/27/19 0930)     Post Anesthesia Care and Evaluation    Patient location during evaluation: bedside  Patient participation: complete - patient participated  Level of consciousness: awake and alert  Pain management: adequate  Airway patency: patent  Anesthetic complications: No anesthetic complications    Cardiovascular status: acceptable  Respiratory status: acceptable  Hydration status: acceptable  Post Neuraxial Block status: No signs or symptoms of PDPH  Comments: /54   Pulse 63   Temp 37.2 °C (98.9 °F) (Oral)   Resp 16   Ht 170.2 cm (67\")   Wt 74.7 kg (164 lb 9.6 oz)   SpO2 95%   BMI 25.78 kg/m²       "

## 2019-05-27 NOTE — ANESTHESIA PROCEDURE NOTES
Spinal Block    Pre-sedation assessment completed: 5/27/2019 7:47 AM    Patient reassessed immediately prior to procedure    Patient location during procedure: OR  Start Time: 5/27/2019 7:48 AM  Stop Time: 5/27/2019 7:51 AM  Indication:at surgeon's request and procedure for pain  Performed By  Anesthesiologist: Jae Wheeler MD  Preanesthetic Checklist  Completed: patient identified, site marked, surgical consent, pre-op evaluation, timeout performed, IV checked, risks and benefits discussed and monitors and equipment checked  Spinal Block Prep:  Patient Position:sitting  Sterile Tech:cap, gloves, mask and sterile barriers  Prep:Chloraprep  Patient Monitoring:blood pressure monitoring, continuous pulse oximetry and EKG  Spinal Block Procedure  Approach:midline  Guidance:landmark technique and palpation technique  Location:L4-L5  Needle Type:Leanne  Needle Gauge:25 G  Placement of Spinal needle event:cerebrospinal fluid aspirated  Paresthesia: no  Fluid Appearance:clear  Medications: bupivacaine PF (MARCAINE) 0.75 % injection, 1.8 mL  Med Administered at 5/27/2019 7:51 AM   Post Assessment  Patient Tolerance:patient tolerated the procedure well with no apparent complications  Complications no  Additional Notes  Easy, atraumatic. First attempt.

## 2019-05-28 LAB
ANION GAP SERPL CALCULATED.3IONS-SCNC: 9.6 MMOL/L
BASOPHILS # BLD AUTO: 0.01 10*3/MM3 (ref 0–0.2)
BASOPHILS NFR BLD AUTO: 0.1 % (ref 0–1.5)
BUN BLD-MCNC: 11 MG/DL (ref 8–23)
BUN/CREAT SERPL: 16.4 (ref 7–25)
CALCIUM SPEC-SCNC: 8.4 MG/DL (ref 8.2–9.6)
CHLORIDE SERPL-SCNC: 100 MMOL/L (ref 98–107)
CO2 SERPL-SCNC: 28.4 MMOL/L (ref 22–29)
CREAT BLD-MCNC: 0.67 MG/DL (ref 0.57–1)
DEPRECATED RDW RBC AUTO: 60.4 FL (ref 37–54)
EOSINOPHIL # BLD AUTO: 0 10*3/MM3 (ref 0–0.4)
EOSINOPHIL NFR BLD AUTO: 0 % (ref 0.3–6.2)
ERYTHROCYTE [DISTWIDTH] IN BLOOD BY AUTOMATED COUNT: 16.9 % (ref 12.3–15.4)
GFR SERPL CREATININE-BSD FRML MDRD: 83 ML/MIN/1.73
GLUCOSE BLD-MCNC: 118 MG/DL (ref 65–99)
HCT VFR BLD AUTO: 33.7 % (ref 34–46.6)
HGB BLD-MCNC: 10.4 G/DL (ref 12–15.9)
IMM GRANULOCYTES # BLD AUTO: 0.1 10*3/MM3 (ref 0–0.05)
IMM GRANULOCYTES NFR BLD AUTO: 0.8 % (ref 0–0.5)
LYMPHOCYTES # BLD AUTO: 1.07 10*3/MM3 (ref 0.7–3.1)
LYMPHOCYTES NFR BLD AUTO: 9.1 % (ref 19.6–45.3)
MCH RBC QN AUTO: 29.7 PG (ref 26.6–33)
MCHC RBC AUTO-ENTMCNC: 30.9 G/DL (ref 31.5–35.7)
MCV RBC AUTO: 96.3 FL (ref 79–97)
MONOCYTES # BLD AUTO: 0.82 10*3/MM3 (ref 0.1–0.9)
MONOCYTES NFR BLD AUTO: 6.9 % (ref 5–12)
NEUTROPHILS # BLD AUTO: 9.8 10*3/MM3 (ref 1.7–7)
NEUTROPHILS NFR BLD AUTO: 83.1 % (ref 42.7–76)
NRBC BLD AUTO-RTO: 0.1 /100 WBC (ref 0–0.2)
PLATELET # BLD AUTO: 257 10*3/MM3 (ref 140–450)
PMV BLD AUTO: 10.9 FL (ref 6–12)
POTASSIUM BLD-SCNC: 4.3 MMOL/L (ref 3.5–5.2)
RBC # BLD AUTO: 3.5 10*6/MM3 (ref 3.77–5.28)
SODIUM BLD-SCNC: 138 MMOL/L (ref 136–145)
WBC NRBC COR # BLD: 11.8 10*3/MM3 (ref 3.4–10.8)

## 2019-05-28 PROCEDURE — 97110 THERAPEUTIC EXERCISES: CPT

## 2019-05-28 PROCEDURE — 97162 PT EVAL MOD COMPLEX 30 MIN: CPT

## 2019-05-28 PROCEDURE — 97165 OT EVAL LOW COMPLEX 30 MIN: CPT

## 2019-05-28 PROCEDURE — 80048 BASIC METABOLIC PNL TOTAL CA: CPT | Performed by: ORTHOPAEDIC SURGERY

## 2019-05-28 PROCEDURE — 85025 COMPLETE CBC W/AUTO DIFF WBC: CPT | Performed by: HOSPITALIST

## 2019-05-28 PROCEDURE — 97535 SELF CARE MNGMENT TRAINING: CPT

## 2019-05-28 RX ADMIN — ASPIRIN 81 MG: 81 TABLET, CHEWABLE ORAL at 08:42

## 2019-05-28 RX ADMIN — HYDROCODONE BITARTRATE AND ACETAMINOPHEN 1 TABLET: 7.5; 325 TABLET ORAL at 19:20

## 2019-05-28 RX ADMIN — DOCUSATE SODIUM 100 MG: 100 CAPSULE, LIQUID FILLED ORAL at 20:17

## 2019-05-28 RX ADMIN — PANTOPRAZOLE SODIUM 40 MG: 40 TABLET, DELAYED RELEASE ORAL at 06:00

## 2019-05-28 RX ADMIN — APIXABAN 2.5 MG: 2.5 TABLET, FILM COATED ORAL at 20:17

## 2019-05-28 RX ADMIN — CARVEDILOL 3.12 MG: 3.12 TABLET, FILM COATED ORAL at 20:17

## 2019-05-28 RX ADMIN — PAROXETINE 20 MG: 20 TABLET, FILM COATED ORAL at 08:42

## 2019-05-28 RX ADMIN — ATORVASTATIN CALCIUM 10 MG: 10 TABLET, FILM COATED ORAL at 20:17

## 2019-05-28 RX ADMIN — DONEPEZIL HYDROCHLORIDE 10 MG: 10 TABLET, FILM COATED ORAL at 20:17

## 2019-05-28 RX ADMIN — LEVOTHYROXINE SODIUM 75 MCG: 75 TABLET ORAL at 06:00

## 2019-05-28 RX ADMIN — CARVEDILOL 3.12 MG: 3.12 TABLET, FILM COATED ORAL at 08:42

## 2019-05-29 VITALS
HEIGHT: 67 IN | RESPIRATION RATE: 16 BRPM | TEMPERATURE: 97.1 F | HEART RATE: 85 BPM | BODY MASS INDEX: 25.83 KG/M2 | WEIGHT: 164.6 LBS | SYSTOLIC BLOOD PRESSURE: 128 MMHG | OXYGEN SATURATION: 92 % | DIASTOLIC BLOOD PRESSURE: 88 MMHG

## 2019-05-29 LAB
ANION GAP SERPL CALCULATED.3IONS-SCNC: 9.8 MMOL/L
BASOPHILS # BLD AUTO: 0.1 10*3/MM3 (ref 0–0.2)
BASOPHILS NFR BLD AUTO: 0.9 % (ref 0–1.5)
BUN BLD-MCNC: 13 MG/DL (ref 8–23)
BUN/CREAT SERPL: 18.6 (ref 7–25)
CALCIUM SPEC-SCNC: 8.5 MG/DL (ref 8.2–9.6)
CHLORIDE SERPL-SCNC: 100 MMOL/L (ref 98–107)
CO2 SERPL-SCNC: 32.2 MMOL/L (ref 22–29)
CREAT BLD-MCNC: 0.7 MG/DL (ref 0.57–1)
DEPRECATED RDW RBC AUTO: 60 FL (ref 37–54)
EOSINOPHIL # BLD AUTO: 0.26 10*3/MM3 (ref 0–0.4)
EOSINOPHIL NFR BLD AUTO: 2.3 % (ref 0.3–6.2)
ERYTHROCYTE [DISTWIDTH] IN BLOOD BY AUTOMATED COUNT: 17 % (ref 12.3–15.4)
GFR SERPL CREATININE-BSD FRML MDRD: 79 ML/MIN/1.73
GLUCOSE BLD-MCNC: 88 MG/DL (ref 65–99)
HCT VFR BLD AUTO: 35.5 % (ref 34–46.6)
HGB BLD-MCNC: 10.9 G/DL (ref 12–15.9)
IMM GRANULOCYTES # BLD AUTO: 0.07 10*3/MM3 (ref 0–0.05)
IMM GRANULOCYTES NFR BLD AUTO: 0.6 % (ref 0–0.5)
LYMPHOCYTES # BLD AUTO: 3.09 10*3/MM3 (ref 0.7–3.1)
LYMPHOCYTES NFR BLD AUTO: 27.2 % (ref 19.6–45.3)
MCH RBC QN AUTO: 29.5 PG (ref 26.6–33)
MCHC RBC AUTO-ENTMCNC: 30.7 G/DL (ref 31.5–35.7)
MCV RBC AUTO: 96.2 FL (ref 79–97)
MONOCYTES # BLD AUTO: 1.07 10*3/MM3 (ref 0.1–0.9)
MONOCYTES NFR BLD AUTO: 9.4 % (ref 5–12)
NEUTROPHILS # BLD AUTO: 6.76 10*3/MM3 (ref 1.7–7)
NEUTROPHILS NFR BLD AUTO: 59.6 % (ref 42.7–76)
NRBC BLD AUTO-RTO: 0 /100 WBC (ref 0–0.2)
PLATELET # BLD AUTO: 306 10*3/MM3 (ref 140–450)
PMV BLD AUTO: 10.4 FL (ref 6–12)
POTASSIUM BLD-SCNC: 3.8 MMOL/L (ref 3.5–5.2)
RBC # BLD AUTO: 3.69 10*6/MM3 (ref 3.77–5.28)
SODIUM BLD-SCNC: 142 MMOL/L (ref 136–145)
WBC NRBC COR # BLD: 11.35 10*3/MM3 (ref 3.4–10.8)

## 2019-05-29 PROCEDURE — 99024 POSTOP FOLLOW-UP VISIT: CPT | Performed by: ORTHOPAEDIC SURGERY

## 2019-05-29 PROCEDURE — 80048 BASIC METABOLIC PNL TOTAL CA: CPT | Performed by: HOSPITALIST

## 2019-05-29 PROCEDURE — 85025 COMPLETE CBC W/AUTO DIFF WBC: CPT | Performed by: HOSPITALIST

## 2019-05-29 RX ORDER — LEVOTHYROXINE SODIUM 0.07 MG/1
75 TABLET ORAL DAILY
Qty: 30 TABLET | Refills: 0 | Status: SHIPPED | OUTPATIENT
Start: 2019-05-29 | End: 2019-06-28

## 2019-05-29 RX ORDER — PANTOPRAZOLE SODIUM 40 MG/1
40 TABLET, DELAYED RELEASE ORAL DAILY
Qty: 30 TABLET | Refills: 0 | Status: SHIPPED | OUTPATIENT
Start: 2019-05-29 | End: 2019-06-28

## 2019-05-29 RX ORDER — ATORVASTATIN CALCIUM 10 MG/1
10 TABLET, FILM COATED ORAL NIGHTLY
Qty: 30 TABLET | Refills: 0 | Status: SHIPPED | OUTPATIENT
Start: 2019-05-29 | End: 2019-06-28

## 2019-05-29 RX ORDER — HYDROCODONE BITARTRATE AND ACETAMINOPHEN 5; 325 MG/1; MG/1
1 TABLET ORAL EVERY 6 HOURS PRN
Qty: 10 TABLET | Refills: 0 | Status: SHIPPED | OUTPATIENT
Start: 2019-05-29 | End: 2019-06-01

## 2019-05-29 RX ADMIN — PANTOPRAZOLE SODIUM 40 MG: 40 TABLET, DELAYED RELEASE ORAL at 06:31

## 2019-05-29 RX ADMIN — ASPIRIN 81 MG: 81 TABLET, CHEWABLE ORAL at 08:45

## 2019-05-29 RX ADMIN — HYDROCODONE BITARTRATE AND ACETAMINOPHEN 1 TABLET: 7.5; 325 TABLET ORAL at 08:45

## 2019-05-29 RX ADMIN — CARVEDILOL 3.12 MG: 3.12 TABLET, FILM COATED ORAL at 08:45

## 2019-05-29 RX ADMIN — APIXABAN 2.5 MG: 2.5 TABLET, FILM COATED ORAL at 08:45

## 2019-05-29 RX ADMIN — HYDROCODONE BITARTRATE AND ACETAMINOPHEN 1 TABLET: 7.5; 325 TABLET ORAL at 13:00

## 2019-05-29 RX ADMIN — PAROXETINE 20 MG: 20 TABLET, FILM COATED ORAL at 08:45

## 2019-05-29 RX ADMIN — LEVOTHYROXINE SODIUM 75 MCG: 75 TABLET ORAL at 06:31

## 2019-06-10 ENCOUNTER — OFFICE VISIT (OUTPATIENT)
Dept: CARDIOLOGY | Facility: CLINIC | Age: 84
End: 2019-06-10

## 2019-06-10 ENCOUNTER — OFFICE VISIT (OUTPATIENT)
Dept: ORTHOPEDIC SURGERY | Facility: CLINIC | Age: 84
End: 2019-06-10

## 2019-06-10 VITALS
SYSTOLIC BLOOD PRESSURE: 98 MMHG | HEIGHT: 67 IN | OXYGEN SATURATION: 92 % | BODY MASS INDEX: 25.11 KG/M2 | WEIGHT: 160 LBS | HEART RATE: 66 BPM | DIASTOLIC BLOOD PRESSURE: 62 MMHG

## 2019-06-10 VITALS — WEIGHT: 160 LBS | BODY MASS INDEX: 25.11 KG/M2 | HEIGHT: 67 IN | TEMPERATURE: 98.3 F

## 2019-06-10 DIAGNOSIS — Z09 SURGERY FOLLOW-UP: Primary | ICD-10-CM

## 2019-06-10 DIAGNOSIS — I48.0 PAROXYSMAL ATRIAL FIBRILLATION (HCC): Primary | ICD-10-CM

## 2019-06-10 PROCEDURE — 99213 OFFICE O/P EST LOW 20 MIN: CPT | Performed by: INTERNAL MEDICINE

## 2019-06-10 PROCEDURE — 99024 POSTOP FOLLOW-UP VISIT: CPT | Performed by: ORTHOPAEDIC SURGERY

## 2019-06-10 PROCEDURE — 93000 ELECTROCARDIOGRAM COMPLETE: CPT | Performed by: INTERNAL MEDICINE

## 2019-06-10 PROCEDURE — 73560 X-RAY EXAM OF KNEE 1 OR 2: CPT | Performed by: ORTHOPAEDIC SURGERY

## 2019-06-10 RX ORDER — QUETIAPINE FUMARATE 25 MG/1
25 TABLET, FILM COATED ORAL NIGHTLY
COMMUNITY

## 2019-06-10 RX ORDER — OXYCODONE AND ACETAMINOPHEN 7.5; 325 MG/1; MG/1
1 TABLET ORAL EVERY 6 HOURS PRN
COMMUNITY
End: 2019-07-26

## 2019-06-10 NOTE — PROGRESS NOTES
Ms. Ordonez is now 2 weeks out from fixation of her left patella.  She comes in with her son.  She reports compliance with the brace.  Denies any problems.    Left knee incision is healed.  Staples were removed.  The abrasions anteriorly are healing as well.  Calf is soft.  Good motor and sensory function in her foot.    AP and lateral views of the left knee are ordered and reviewed.  These compared to previous x-rays.  Hardware is well-positioned.  Fracture is well reduced.  No new findings.  No callus yet.    Assessment/plan: 2-week status post ORIF left patella    Plan: Continue to keep the knee in extension.  The brace is allowing her to flex about 30 degrees or so and I think that is fine.  She can weight-bear as tolerated only in extension.  I will see her back in another 2 weeks at which time I will let her start working on progressive range of motion.    Oleg David MD

## 2019-06-12 ENCOUNTER — TELEPHONE (OUTPATIENT)
Dept: ORTHOPEDIC SURGERY | Facility: CLINIC | Age: 84
End: 2019-06-12

## 2019-06-12 NOTE — TELEPHONE ENCOUNTER
Patient is scheduled 6/24 for 2 WK FU LT PATELLA SX 5/27/19     Ayah - intake nurse with [facility] called requesting verbal orders for PT & OT for Left Knee to eval to treat

## 2019-06-13 NOTE — PROGRESS NOTES
Subjective:     Encounter Date:06/10/2019      Patient ID: Christianne Ordonez is a 89 y.o. female.    Chief Complaint: PAF    History of Present Illness    Dear Dr. Torre,    I had the pleasure of seeing your patient in cardiac followup today. As you well know, she is a lon 89-year-old woman with history of paroxysmal atrial fibrillation. She has had a stroke in the past. She takes apixaban for stroke prevention.     She comes in for routine followup. Since I have last seen her she reports having had a left patella fracture. She had surgery for this and got through it without much difficulty. Overall she is doing quite well.         Review of Systems   All other systems reviewed and are negative.        ECG 12 Lead  Date/Time: 6/10/2019 8:49 AM  Performed by: Vince Lund MD  Authorized by: Vince Lund MD   Comparison: compared with previous ECG   Similar to previous ECG  Rhythm: sinus rhythm  BPM: 68  QRS axis: left                 Objective:     Physical Exam   Constitutional: She is oriented to person, place, and time. She appears well-developed and well-nourished.   HENT:   Head: Normocephalic and atraumatic.   Neck: Normal range of motion. Neck supple.   Cardiovascular: Normal rate, regular rhythm and normal heart sounds.   Pulmonary/Chest: Effort normal and breath sounds normal.   Abdominal: Soft. Bowel sounds are normal.   Musculoskeletal: Normal range of motion.   Neurological: She is alert and oriented to person, place, and time.   Skin: Skin is warm and dry.   Psychiatric: She has a normal mood and affect. Her behavior is normal. Thought content normal.   Vitals reviewed.      Lab Review:       Assessment:          Diagnosis Plan   1. Paroxysmal atrial fibrillation (CMS/HCC)            Plan:       It was a pleasure to see your patient in cardiac followup today. She is doing well from the cardiac standpoint. She is on low dose apixaban for stroke prevention. She tolerates this well despite risk of  falling. She got through knee surgery without difficulty. She will see us again in 1 year or sooner if symptoms warrant.     Atrial Fibrillation and Atrial Flutter  Assessment  • The patient has paroxysmal atrial fibrillation  • This is non-valvular in etiology  • The patient's CHADS2-VASc score is 5  • A [id number] score of 2 or more is considered a high risk for a thromboembolic event  • Apixaban prescribed    Plan  • Continue in atrial fibrillation with rate control  • Continue apixaban for antithrombotic therapy, bleeding issues discussed  • Continue beta blocker for rate control

## 2019-06-24 ENCOUNTER — OFFICE VISIT (OUTPATIENT)
Dept: ORTHOPEDIC SURGERY | Facility: CLINIC | Age: 84
End: 2019-06-24

## 2019-06-24 VITALS — TEMPERATURE: 98 F | BODY MASS INDEX: 25.9 KG/M2 | WEIGHT: 165 LBS | HEIGHT: 67 IN

## 2019-06-24 DIAGNOSIS — Z09 SURGERY FOLLOW-UP: Primary | ICD-10-CM

## 2019-06-24 PROCEDURE — 73560 X-RAY EXAM OF KNEE 1 OR 2: CPT | Performed by: ORTHOPAEDIC SURGERY

## 2019-06-24 PROCEDURE — 99024 POSTOP FOLLOW-UP VISIT: CPT | Performed by: ORTHOPAEDIC SURGERY

## 2019-06-24 NOTE — PROGRESS NOTES
Ms. Ordonez is now a month out from fixation of her left patella.  Denies any problems or issues.  She is anxious to get the brace off.    Her wound is healed.  She has good strength with knee extension.  Flexion is to approximately 60 to 70 degrees without discomfort.  Calf is soft.    AP and lateral views of the left knee are ordered and reviewed.  These compared to previous x-rays.  No change in alignment.  There appears to be some early callus formation    Assessment: 1 month status post ORIF left patella    Plan: Start working on progressive range of motion with therapy.  Okay to discontinue use of the brace at this point.  Follow-up in 1 month.  Appropriate activity modifications and restrictions discussed.    Oleg David MD

## 2019-07-08 ENCOUNTER — TELEPHONE (OUTPATIENT)
Dept: ORTHOPEDIC SURGERY | Facility: CLINIC | Age: 84
End: 2019-07-08

## 2019-07-10 ENCOUNTER — TELEPHONE (OUTPATIENT)
Dept: ORTHOPEDIC SURGERY | Facility: CLINIC | Age: 84
End: 2019-07-10

## 2019-07-10 DIAGNOSIS — Z09 SURGERY FOLLOW-UP: Primary | ICD-10-CM

## 2019-07-24 DIAGNOSIS — R41.3 MEMORY LOSS: ICD-10-CM

## 2019-07-24 RX ORDER — LORAZEPAM 0.5 MG/1
TABLET ORAL
Status: ON HOLD | COMMUNITY
Start: 2019-06-21 | End: 2020-06-17 | Stop reason: SDUPTHER

## 2019-07-24 RX ORDER — DOXEPIN HYDROCHLORIDE 25 MG/1
CAPSULE ORAL
Refills: 3 | COMMUNITY
Start: 2019-06-15

## 2019-07-24 RX ORDER — DONEPEZIL HYDROCHLORIDE 5 MG/1
5 TABLET, FILM COATED ORAL NIGHTLY
Qty: 30 TABLET | Refills: 0 | OUTPATIENT
Start: 2019-07-24

## 2019-07-24 RX ORDER — LEVOTHYROXINE SODIUM 0.05 MG/1
TABLET ORAL
COMMUNITY
Start: 2019-06-17 | End: 2019-07-26

## 2019-07-25 NOTE — TELEPHONE ENCOUNTER
I have had multiple requests regarding prescriptions for this patient in the last several months. I have not seen her and it looks like she last saw Rosa Maria over a year ago and no f/u jeremias with me scheduled.  Rosa Maria's last note states she increased her donepezil (Aricept) to 10 mg but the refill request is for 5 mg daily. Can we find out what she is taking and if she is going to f/U I can refill. Thanks.

## 2019-07-26 ENCOUNTER — OFFICE VISIT (OUTPATIENT)
Dept: ORTHOPEDIC SURGERY | Facility: CLINIC | Age: 84
End: 2019-07-26

## 2019-07-26 VITALS — BODY MASS INDEX: 25.9 KG/M2 | TEMPERATURE: 97.8 F | WEIGHT: 165 LBS | HEIGHT: 67 IN

## 2019-07-26 DIAGNOSIS — Z09 SURGERY FOLLOW-UP: Primary | ICD-10-CM

## 2019-07-26 DIAGNOSIS — M25.562 LEFT KNEE PAIN, UNSPECIFIED CHRONICITY: ICD-10-CM

## 2019-07-26 PROCEDURE — 73560 X-RAY EXAM OF KNEE 1 OR 2: CPT | Performed by: ORTHOPAEDIC SURGERY

## 2019-07-26 PROCEDURE — 99024 POSTOP FOLLOW-UP VISIT: CPT | Performed by: ORTHOPAEDIC SURGERY

## 2019-07-26 RX ORDER — ATORVASTATIN CALCIUM 20 MG/1
20 TABLET, FILM COATED ORAL DAILY
Refills: 3 | COMMUNITY
Start: 2019-07-24

## 2019-07-26 RX ORDER — LEVOTHYROXINE SODIUM 0.07 MG/1
75 TABLET ORAL DAILY
Refills: 0 | COMMUNITY
Start: 2019-07-08 | End: 2020-02-20 | Stop reason: ALTCHOICE

## 2019-07-28 NOTE — PROGRESS NOTES
Ms Ordonez is now 2 months out from her surgery.  She says the knee is doing great.  Denies any problems.    Incision is healed.  No tenderness over the patella.  Excellent motion.  Good strength with resisted knee extension.  Normal gait.    AP and lateral views of the left knee are ordered, reviewed, and compared to previous x-rays.  The fracture appears healed.  No concerning findings noted.    Assessment: 2 months status post ORIF left patella    Plan: She is doing great.  Follow-up as needed.

## 2019-07-29 ENCOUNTER — TELEPHONE (OUTPATIENT)
Dept: NEUROLOGY | Facility: CLINIC | Age: 84
End: 2019-07-29

## 2019-07-29 ENCOUNTER — TELEPHONE (OUTPATIENT)
Dept: ORTHOPEDIC SURGERY | Facility: CLINIC | Age: 84
End: 2019-07-29

## 2019-07-29 DIAGNOSIS — R41.3 MEMORY LOSS: ICD-10-CM

## 2019-07-29 RX ORDER — DONEPEZIL HYDROCHLORIDE 5 MG/1
5 TABLET, FILM COATED ORAL NIGHTLY
Qty: 30 TABLET | Refills: 2 | Status: SHIPPED | OUTPATIENT
Start: 2019-07-29 | End: 2019-07-29

## 2019-07-29 NOTE — TELEPHONE ENCOUNTER
Patient has not been taking it, son called back and returned my phone call. His sister had been taking care of patient, recently up and moved out and there has been missing medication's and a lot of things that have slipped threw the cracks.  I made an appointment for patient for you to see on 8/6/19

## 2019-07-29 NOTE — TELEPHONE ENCOUNTER
Patient was last seen on, 06/06/2018 by Rosa Maria Mercado.    Does not have a follow up appointment scheduled   and you have never seen patient.  I have attempted to call multiple times to verify patient's dosage on medication and make an appointment, no success.

## 2019-08-06 ENCOUNTER — OFFICE VISIT (OUTPATIENT)
Dept: NEUROLOGY | Facility: CLINIC | Age: 84
End: 2019-08-06

## 2019-08-06 VITALS
OXYGEN SATURATION: 97 % | RESPIRATION RATE: 19 BRPM | BODY MASS INDEX: 25.93 KG/M2 | DIASTOLIC BLOOD PRESSURE: 74 MMHG | WEIGHT: 165.2 LBS | HEIGHT: 67 IN | HEART RATE: 67 BPM | SYSTOLIC BLOOD PRESSURE: 118 MMHG

## 2019-08-06 DIAGNOSIS — F03.90 DEMENTIA WITHOUT BEHAVIORAL DISTURBANCE, UNSPECIFIED DEMENTIA TYPE: ICD-10-CM

## 2019-08-06 DIAGNOSIS — I67.2 ATHEROSCLEROTIC CEREBROVASCULAR DISEASE: Primary | ICD-10-CM

## 2019-08-06 DIAGNOSIS — E78.2 MIXED HYPERLIPIDEMIA: ICD-10-CM

## 2019-08-06 DIAGNOSIS — I48.0 PAROXYSMAL ATRIAL FIBRILLATION (HCC): ICD-10-CM

## 2019-08-06 PROCEDURE — 99215 OFFICE O/P EST HI 40 MIN: CPT | Performed by: NURSE PRACTITIONER

## 2019-08-06 RX ORDER — DONEPEZIL HYDROCHLORIDE 10 MG/1
10 TABLET, FILM COATED ORAL NIGHTLY
Qty: 30 TABLET | Refills: 5 | Status: SHIPPED | OUTPATIENT
Start: 2019-08-06

## 2019-08-06 RX ORDER — DONEPEZIL HYDROCHLORIDE 10 MG/1
10 TABLET, FILM COATED ORAL NIGHTLY
COMMUNITY
End: 2019-08-06 | Stop reason: SDUPTHER

## 2019-08-06 NOTE — PROGRESS NOTES
DOS: 2019  NAME: Christianne Ordonez   : 1929  PCP: Ray Torre MD    Chief Complaint   Patient presents with   • Memory Loss     1 year follow up, former [facility] patient      SUBJECTIVE  Neurological Problem:  89 y.o. LHW female with Afib, HLD, HTN, dementia and stroke. She presents today for f/u of stroke and memory loss. She is accompanied by her granddaughter. Patient and problem are new to examiner. History is provided by primarily granddaughter and patient and review of records that are summarized below.     Interval History:    - : Ms. Ordonez presented to [facility] with confusion and decreased responsiveness several days after she suffered a LMCA stroke with a diagnosis of Afib and b/l MCA stenosis, originally treated at [facility]. Review of records from [facility] show 2D echo with LVEF 50-55%, NELLIE was negative for PFO but did show moderate plaque in transverse aorta grade 3/4, no mobile atheroma present. She was not on any antiplatelets or anticoagulation PTA. Her LDL was 120, TSH 11.5. She was discharged on ASA 81 mg, Eliquis and Lipitor 20 mg from [facility], and later from [facility] on the same.     She had f/u CTA that was stable, continues to show b/l MCA stenosis. She followed-up in the clinic, most recently last year with c/o memory difficulties. Her memory issues started prior to stroke but worsened significantly afterwards. She was started on donepezil and titrated to 10 mg daily.    She presents today and continues on Eliquis, ASA 81 mg and Lipitor 20 mg and is tolerating medications well. According to her grand-daughter there have been some changes in the patients living situation. She resides in her own home but her daughter (granddaughter's mother) had been living with her and assisting her with medications etc; however her daughter suffers from bipolar and the patient was under significant stress/anxiety as a result. The daughter has since moved out and it was discovered she was not giving her  mother all of her medicines, she was not getting the aricept which the daughter states was restarted about a month ago.  Denies any medication side effects.  Her son lives with her and granddaughter lives nearby. She reports that her memory is fairly stable from a year prior.    She she did suffer a recent fall (tripped over her safe) and had significant agitation, personality changes while hospitalized and in rehab.  These resolved when she returned home. She has recovered well from her original stroke except for residual speech difficulty. She is able to dress herself, does need prompting or reminding to take a shower. She takes her own medications but they are put in pill box for her.  She has been using a walker since her stroke.  She states her BP is well controlled.  She follows up with her PCP for routine lab work.  She denies smoking.  No alcohol use.    Review of Systems:Review of Systems   Constitutional: Negative for activity change, appetite change and fatigue.   HENT: Positive for trouble swallowing. Negative for facial swelling and voice change.    Eyes: Positive for visual disturbance (L eye). Negative for photophobia and pain.   Respiratory: Positive for shortness of breath and wheezing. Negative for chest tightness.    Cardiovascular: Negative for chest pain, palpitations and leg swelling.   Gastrointestinal: Positive for abdominal pain. Negative for constipation, diarrhea, nausea and vomiting.   Endocrine: Negative for polydipsia and polyphagia.   Musculoskeletal: Positive for back pain. Negative for joint swelling and neck pain.   Skin: Negative for rash and wound.   Neurological: Positive for speech difficulty (at times) and weakness. Negative for dizziness, tremors, seizures, syncope, facial asymmetry, light-headedness, numbness and headaches.   Hematological: Bruises/bleeds easily.   Psychiatric/Behavioral: Positive for agitation (at times) and dysphoric mood. Negative for behavioral problems,  confusion, decreased concentration, hallucinations, self-injury, sleep disturbance and suicidal ideas. The patient is not nervous/anxious and is not hyperactive.     Above ROS reviewed    Current Medications:   Current Outpatient Medications:   •  aspirin 81 MG tablet, Take by mouth daily., Disp: , Rfl:   •  atorvastatin (LIPITOR) 20 MG tablet, Take 20 mg by mouth Daily., Disp: , Rfl: 3  •  carvedilol (COREG) 3.125 MG tablet, Take 3.125 mg by mouth 2 (two) times a day with meals., Disp: , Rfl:   •  donepezil (ARICEPT) 10 MG tablet, Take 1 tablet by mouth Every Night., Disp: 30 tablet, Rfl: 5  •  doxepin (SINEquan) 25 MG capsule, TK 1 C PO Q NIGHT, Disp: , Rfl: 3  •  ELIQUIS 2.5 MG tablet tablet, TAKE 1 TABLET BY MOUTH TWICE DAILY, Disp: 180 tablet, Rfl: 3  •  levothyroxine (SYNTHROID, LEVOTHROID) 75 MCG tablet, Take 75 mcg by mouth Daily., Disp: , Rfl: 0  •  LORazepam (ATIVAN) 0.5 MG tablet, , Disp: , Rfl:   •  PARoxetine (PAXIL) 20 MG tablet, TAKE ONE TABLET BY MOUTH ONCE DAILY, Disp: 90 tablet, Rfl: 0  •  QUEtiapine (SEROquel) 25 MG tablet, Take 25 mg by mouth Every Night., Disp: , Rfl:   •  saccharomyces boulardii (FLORASTOR) 250 MG capsule, Take 1 capsule by mouth 2 (two) times a day., Disp: , Rfl:     The following portions of the patient's history were reviewed and updated as appropriate: allergies, current medications, past family history, past medical history, past social history, past surgical history and problem list.    OBJECTIVE  Vitals:    08/06/19 1442   BP: 118/74   Pulse: 67   Resp: 19   SpO2: 97%       Diagnostics:  CTA h/n, CTP 1/26/15: IMPRESSION-  1. There clearly is a subacute infarct within the left lentiform  nucleus, posterior capsule, head of the caudate, and extending cephalad  along the left corona radiata. This area demonstrates decreased  attenuation on the standard postcontrast head CT. There is a fairly long  segment of irregular contour and luminal narrowing of the left distal  ICA,  proximal left M1 and A1 segments that probably represents sequela  of prior stroke and perhaps recanalization. There are essentially  matching perfusion defects present within this distribution.  2. There is also noted on the CTA an apparent area of focal high-grade  stenosis of the distal right M1 segment of the MCA. This could be a more  acute embolus. However, no concordant perfusion defect is identified.  CTA head 6/5/15: IMPRESSION-   1. Evolving ischemic infarct involving the basal ganglia on the left  with associated volume loss. There is no evidence of acute infarction or  hemorrhage.  2. Severe stenosis involving the distal right M1 segment involving the  posterior division of the right middle cerebral artery. Milder stenoses  involving the proximal M1 segment on the right and proximal to mid M1  segment on the left are noted, unchanged.  CT head 12/22/18: FINDINGS:  There are no abnormal areas of increased density or mass  effect. Minimal atrophy. There is stable encephalomalacia in the left  periventricular white matter with some adjacent porencephaly and ex  vacuo dilatation. No hydrocephalus.  There are mild scattered areas of  decreased density in the white matter likely related to chronic ischemic  gliotic changes. . Bone window images are unremarkable.    CT head 5/22/19: No acute intracranial hemorrhage, midline shift or mass effect. No acute  territorial infarct is identified. Encephalomalacia/old infarct changes  again demonstrated are the left basal ganglia. Thinning of the  Pituitary. Mild to moderate periventricular hypodensities suggest chronic small  vessel ischemic change in a patient this age. Arterial calcifications are seen at the base of the brain. Ventricles, cisterns, cerebral sulci are unremarkable for patient age.:     EKG 5/25/19: Sinus Tach  MOCA 8/6/19: 18/30    Laboratory Results:         Lab Results   Component Value Date    WBC 11.35 (H) 05/29/2019    HGB 10.9 (L) 05/29/2019     "HCT 35.5 05/29/2019    MCV 96.2 05/29/2019     05/29/2019     Lab Results   Component Value Date    GLUCOSE 88 05/29/2019    BUN 13 05/29/2019    CREATININE 0.70 05/29/2019    EGFRIFNONA 79 05/29/2019    BCR 18.6 05/29/2019    K 3.8 05/29/2019    CO2 32.2 (H) 05/29/2019    CALCIUM 8.5 05/29/2019    ALBUMIN 2.90 (L) 05/26/2019    LABIL2 1.4 09/25/2018    AST 21 05/26/2019    ALT 14 05/26/2019     Lab Results   Component Value Date    HGBA1C 5.60 05/26/2019     Lab Results   Component Value Date    CHOL 112 05/26/2019     Lab Results   Component Value Date    HDL 45 05/26/2019    HDL 44 (L) 09/25/2018     Lab Results   Component Value Date    LDL 52 05/26/2019    LDL 72 09/25/2018     Lab Results   Component Value Date    TRIG 76 05/26/2019    TRIG 109 09/25/2018     No results found for: RPR  Lab Results   Component Value Date    TSH 10.800 (H) 05/26/2019     No results found for: [id number]    Physical Examination:   General Appearance:   Well developed, well nourished, well groomed, alert, and cooperative.  HEENT: Normocephalic.    Neck and Spine: Normal range of motion.  Normal alignment. No mass or tenderness.   Cardiac: Regular rate and rhythm.   Peripheral Vasculature: Radial pulses are equal and symmetric. No signs of distal embolization.  Extremities:    No edema or deformities.   Skin:    No rashes or birth marks.  Psychiatric:    Euthymic. Normal affect.    Neurological examination:  Higher Integrative  Function: Unable to give date, place. Oriented to person, family, \"office\", \"[place]\".  Normal registration, poor recall. Decreased attention span and concentration. Spontaneous speech , repetition, reading, writing, naming and vocabulary. No obvious neglect. Unable to correctly draw a clock or copy cube but able to draw a simple house.  Some difficulty with multistep instruction.    CN II: Pupils are equal, round, and reactive to light. Normal visual acuity and visual fields.    CN III IV " VI: Extraocular movements are full without nystagmus.   CN V: Normal facial sensation and strength of muscles of mastication.  CN VII: Facial movements are symmetric. No weakness.  CN VIII:   Decreased acuity bilaterally  CN IX & X:   Symmetric palatal movement.  CN XI: Sternocleidomastoid and trapezius are normal.  No weakness.  CN XII:   The tongue is midline.  No atrophy or fasciculations.  Motor: Normal muscle strength, bulk and tone in upper and lower extremities.  No fasciculations, rigidity, spasticity, or abnormal movements.  Sensation: Normal to light touch and proprioception in arms and legs.   Station and Gait: Cautious, wide-based gait, mildly antalgic gait   Coordination: Finger to nose test shows no dysmetria.      Impression:  Ms. Ordonez presents for f/u of stroke and dementia.  Stroke standpoint she has done well following a large left MCA stroke she suffered in 2015 due to new onset A. fib.  She has since been maintained on Eliquis with no recurrent or new stroke/TIA symptoms.  She does however have cognitive impairment that was present to some degree prior to her stroke but with progression following.  She was started on Aricept year prior; however there has been some interruption in her medication management due to family situation and Aricept was recently restarted in the last month.  She has been tolerating 10 mg well and according to granddaughter, her cognition is stable, will continue current dose.  Regarding stroke, we discussed her risk factors including BP and cholesterol.  Reviewed signs and symptoms of stroke and the importance of calling 911 if she were to develop any of these.  We also discussed the importance of falls prevention, since she is on anticoagulation.  Patient and granddaughter voiced understanding and agree with above plan. She will follow-up in 6 months, sooner if symptoms warrant.    Plan:     1. Stroke  Continue current medication regimen  Monitor BP regularly  F/U with  PCP for continued cholesterol surveillance  Keep well hydrated  Secondary stroke prevention: Ideal targets for stroke prevention would be Blood pressure < 130/80; B12 > 500 TSH in normal range and LDL < 70; HbA1c < 6.5 and smoking cessation if applicable.  Call 911 for stroke symptoms    2. Dementia  Continue Aricept 10 mg  Encouraged regular physical, mental activity as able  Falls prevention    Follow-up in 6 months    I spent 40 minutes face to face with patient, granddaughter, with  > 50% spent counseling patient regarding diagnosis, review of diagnostics, personal risk factors for stroke and importance of risk factor control for stroke prevention.   Christianne was seen today for memory loss.    Diagnoses and all orders for this visit:    Atherosclerotic cerebrovascular disease    Dementia without behavioral disturbance, unspecified dementia type    Paroxysmal atrial fibrillation (CMS/HCC)    Mixed hyperlipidemia    Other orders  -     donepezil (ARICEPT) 10 MG tablet; Take 1 tablet by mouth Every Night.        Coding      Dictated using Dragon

## 2019-08-08 PROBLEM — E78.2 MIXED HYPERLIPIDEMIA: Status: ACTIVE | Noted: 2019-08-08

## 2020-01-01 ENCOUNTER — READMISSION MANAGEMENT (OUTPATIENT)
Dept: CALL CENTER | Facility: HOSPITAL | Age: 85
End: 2020-01-01

## 2020-01-01 ENCOUNTER — APPOINTMENT (OUTPATIENT)
Dept: CT IMAGING | Facility: HOSPITAL | Age: 85
End: 2020-01-01

## 2020-01-01 ENCOUNTER — APPOINTMENT (OUTPATIENT)
Dept: GENERAL RADIOLOGY | Facility: HOSPITAL | Age: 85
End: 2020-01-01

## 2020-01-01 ENCOUNTER — HOSPITAL ENCOUNTER (INPATIENT)
Facility: HOSPITAL | Age: 85
LOS: 3 days | Discharge: HOME HEALTH | End: 2020-11-19
Attending: EMERGENCY MEDICINE | Admitting: HOSPITALIST

## 2020-01-01 VITALS
TEMPERATURE: 97.3 F | HEART RATE: 80 BPM | RESPIRATION RATE: 16 BRPM | SYSTOLIC BLOOD PRESSURE: 122 MMHG | OXYGEN SATURATION: 95 % | WEIGHT: 160.1 LBS | BODY MASS INDEX: 25.13 KG/M2 | DIASTOLIC BLOOD PRESSURE: 65 MMHG | HEIGHT: 67 IN

## 2020-01-01 DIAGNOSIS — N39.0 UTI (URINARY TRACT INFECTION), BACTERIAL: ICD-10-CM

## 2020-01-01 DIAGNOSIS — A49.9 UTI (URINARY TRACT INFECTION), BACTERIAL: ICD-10-CM

## 2020-01-01 DIAGNOSIS — R41.82 ALTERED MENTAL STATUS, UNSPECIFIED ALTERED MENTAL STATUS TYPE: Primary | ICD-10-CM

## 2020-01-01 LAB
ALBUMIN SERPL-MCNC: 3.1 G/DL (ref 3.5–5.2)
ALBUMIN SERPL-MCNC: 3.7 G/DL (ref 3.5–5.2)
ALBUMIN/GLOB SERPL: 0.8 G/DL
ALBUMIN/GLOB SERPL: 1.2 G/DL
ALP SERPL-CCNC: 100 U/L (ref 39–117)
ALP SERPL-CCNC: 104 U/L (ref 39–117)
ALT SERPL W P-5'-P-CCNC: 23 U/L (ref 1–33)
ALT SERPL W P-5'-P-CCNC: 28 U/L (ref 1–33)
ANION GAP SERPL CALCULATED.3IONS-SCNC: 10.6 MMOL/L (ref 5–15)
ANION GAP SERPL CALCULATED.3IONS-SCNC: 10.8 MMOL/L (ref 5–15)
ANION GAP SERPL CALCULATED.3IONS-SCNC: 11.6 MMOL/L (ref 5–15)
ANION GAP SERPL CALCULATED.3IONS-SCNC: 7.6 MMOL/L (ref 5–15)
AST SERPL-CCNC: 49 U/L (ref 1–32)
AST SERPL-CCNC: 51 U/L (ref 1–32)
B PARAPERT DNA SPEC QL NAA+PROBE: NOT DETECTED
B PERT DNA SPEC QL NAA+PROBE: NOT DETECTED
BACTERIA SPEC AEROBE CULT: ABNORMAL
BACTERIA UR QL AUTO: ABNORMAL /HPF
BASOPHILS # BLD AUTO: 0.08 10*3/MM3 (ref 0–0.2)
BASOPHILS # BLD AUTO: 0.1 10*3/MM3 (ref 0–0.2)
BASOPHILS NFR BLD AUTO: 0.6 % (ref 0–1.5)
BASOPHILS NFR BLD AUTO: 0.7 % (ref 0–1.5)
BASOPHILS NFR BLD AUTO: 0.9 % (ref 0–1.5)
BASOPHILS NFR BLD AUTO: 0.9 % (ref 0–1.5)
BILIRUB SERPL-MCNC: 0.8 MG/DL (ref 0–1.2)
BILIRUB SERPL-MCNC: 0.9 MG/DL (ref 0–1.2)
BILIRUB UR QL STRIP: NEGATIVE
BUN SERPL-MCNC: 17 MG/DL (ref 8–23)
BUN SERPL-MCNC: 17 MG/DL (ref 8–23)
BUN SERPL-MCNC: 18 MG/DL (ref 8–23)
BUN SERPL-MCNC: 22 MG/DL (ref 8–23)
BUN/CREAT SERPL: 18.1 (ref 7–25)
BUN/CREAT SERPL: 25.4 (ref 7–25)
BUN/CREAT SERPL: 28.6 (ref 7–25)
BUN/CREAT SERPL: 32.4 (ref 7–25)
C PNEUM DNA NPH QL NAA+NON-PROBE: NOT DETECTED
CALCIUM SPEC-SCNC: 8.6 MG/DL (ref 8.2–9.6)
CALCIUM SPEC-SCNC: 8.9 MG/DL (ref 8.2–9.6)
CALCIUM SPEC-SCNC: 9.1 MG/DL (ref 8.2–9.6)
CALCIUM SPEC-SCNC: 9.6 MG/DL (ref 8.2–9.6)
CHLORIDE SERPL-SCNC: 101 MMOL/L (ref 98–107)
CHLORIDE SERPL-SCNC: 102 MMOL/L (ref 98–107)
CHLORIDE SERPL-SCNC: 104 MMOL/L (ref 98–107)
CHLORIDE SERPL-SCNC: 104 MMOL/L (ref 98–107)
CHOLEST SERPL-MCNC: 104 MG/DL (ref 0–200)
CLARITY UR: ABNORMAL
CO2 SERPL-SCNC: 27.2 MMOL/L (ref 22–29)
CO2 SERPL-SCNC: 27.4 MMOL/L (ref 22–29)
CO2 SERPL-SCNC: 27.4 MMOL/L (ref 22–29)
CO2 SERPL-SCNC: 29.4 MMOL/L (ref 22–29)
COLOR UR: YELLOW
CREAT SERPL-MCNC: 0.63 MG/DL (ref 0.57–1)
CREAT SERPL-MCNC: 0.67 MG/DL (ref 0.57–1)
CREAT SERPL-MCNC: 0.68 MG/DL (ref 0.57–1)
CREAT SERPL-MCNC: 0.94 MG/DL (ref 0.57–1)
DEPRECATED RDW RBC AUTO: 47.9 FL (ref 37–54)
DEPRECATED RDW RBC AUTO: 49.5 FL (ref 37–54)
DEPRECATED RDW RBC AUTO: 49.6 FL (ref 37–54)
DEPRECATED RDW RBC AUTO: 50.4 FL (ref 37–54)
EOSINOPHIL # BLD AUTO: 0.15 10*3/MM3 (ref 0–0.4)
EOSINOPHIL # BLD AUTO: 0.15 10*3/MM3 (ref 0–0.4)
EOSINOPHIL # BLD AUTO: 0.3 10*3/MM3 (ref 0–0.4)
EOSINOPHIL # BLD AUTO: 0.37 10*3/MM3 (ref 0–0.4)
EOSINOPHIL NFR BLD AUTO: 1.1 % (ref 0.3–6.2)
EOSINOPHIL NFR BLD AUTO: 1.1 % (ref 0.3–6.2)
EOSINOPHIL NFR BLD AUTO: 2.8 % (ref 0.3–6.2)
EOSINOPHIL NFR BLD AUTO: 3.3 % (ref 0.3–6.2)
ERYTHROCYTE [DISTWIDTH] IN BLOOD BY AUTOMATED COUNT: 15.2 % (ref 12.3–15.4)
ERYTHROCYTE [DISTWIDTH] IN BLOOD BY AUTOMATED COUNT: 15.3 % (ref 12.3–15.4)
ERYTHROCYTE [DISTWIDTH] IN BLOOD BY AUTOMATED COUNT: 15.4 % (ref 12.3–15.4)
ERYTHROCYTE [DISTWIDTH] IN BLOOD BY AUTOMATED COUNT: 15.5 % (ref 12.3–15.4)
FLUAV SUBTYP SPEC NAA+PROBE: NOT DETECTED
FLUBV RNA ISLT QL NAA+PROBE: NOT DETECTED
GFR SERPL CREATININE-BSD FRML MDRD: 56 ML/MIN/1.73
GFR SERPL CREATININE-BSD FRML MDRD: 81 ML/MIN/1.73
GFR SERPL CREATININE-BSD FRML MDRD: 83 ML/MIN/1.73
GFR SERPL CREATININE-BSD FRML MDRD: 89 ML/MIN/1.73
GLOBULIN UR ELPH-MCNC: 3.2 GM/DL
GLOBULIN UR ELPH-MCNC: 3.7 GM/DL
GLUCOSE BLDC GLUCOMTR-MCNC: 105 MG/DL (ref 70–130)
GLUCOSE BLDC GLUCOMTR-MCNC: 118 MG/DL (ref 70–130)
GLUCOSE SERPL-MCNC: 107 MG/DL (ref 65–99)
GLUCOSE SERPL-MCNC: 110 MG/DL (ref 65–99)
GLUCOSE SERPL-MCNC: 126 MG/DL (ref 65–99)
GLUCOSE SERPL-MCNC: 93 MG/DL (ref 65–99)
GLUCOSE UR STRIP-MCNC: NEGATIVE MG/DL
HADV DNA SPEC NAA+PROBE: NOT DETECTED
HBA1C MFR BLD: 5.9 % (ref 4.8–5.6)
HCOV 229E RNA SPEC QL NAA+PROBE: NOT DETECTED
HCOV HKU1 RNA SPEC QL NAA+PROBE: NOT DETECTED
HCOV NL63 RNA SPEC QL NAA+PROBE: NOT DETECTED
HCOV OC43 RNA SPEC QL NAA+PROBE: NOT DETECTED
HCT VFR BLD AUTO: 37 % (ref 34–46.6)
HCT VFR BLD AUTO: 37.9 % (ref 34–46.6)
HCT VFR BLD AUTO: 41.1 % (ref 34–46.6)
HCT VFR BLD AUTO: 41.5 % (ref 34–46.6)
HDLC SERPL-MCNC: 39 MG/DL (ref 40–60)
HGB BLD-MCNC: 12.2 G/DL (ref 12–15.9)
HGB BLD-MCNC: 12.5 G/DL (ref 12–15.9)
HGB BLD-MCNC: 13.6 G/DL (ref 12–15.9)
HGB BLD-MCNC: 13.6 G/DL (ref 12–15.9)
HGB UR QL STRIP.AUTO: ABNORMAL
HMPV RNA NPH QL NAA+NON-PROBE: NOT DETECTED
HOLD SPECIMEN: NORMAL
HOLD SPECIMEN: NORMAL
HPIV1 RNA SPEC QL NAA+PROBE: NOT DETECTED
HPIV2 RNA SPEC QL NAA+PROBE: NOT DETECTED
HPIV3 RNA NPH QL NAA+PROBE: NOT DETECTED
HPIV4 P GENE NPH QL NAA+PROBE: NOT DETECTED
HYALINE CASTS UR QL AUTO: ABNORMAL /LPF
IMM GRANULOCYTES # BLD AUTO: 0.03 10*3/MM3 (ref 0–0.05)
IMM GRANULOCYTES # BLD AUTO: 0.04 10*3/MM3 (ref 0–0.05)
IMM GRANULOCYTES # BLD AUTO: 0.06 10*3/MM3 (ref 0–0.05)
IMM GRANULOCYTES # BLD AUTO: 0.08 10*3/MM3 (ref 0–0.05)
IMM GRANULOCYTES NFR BLD AUTO: 0.3 % (ref 0–0.5)
IMM GRANULOCYTES NFR BLD AUTO: 0.4 % (ref 0–0.5)
IMM GRANULOCYTES NFR BLD AUTO: 0.4 % (ref 0–0.5)
IMM GRANULOCYTES NFR BLD AUTO: 0.6 % (ref 0–0.5)
KETONES UR QL STRIP: ABNORMAL
LDLC SERPL CALC-MCNC: 50 MG/DL (ref 0–100)
LDLC/HDLC SERPL: 1.29 {RATIO}
LEUKOCYTE ESTERASE UR QL STRIP.AUTO: ABNORMAL
LYMPHOCYTES # BLD AUTO: 1.76 10*3/MM3 (ref 0.7–3.1)
LYMPHOCYTES # BLD AUTO: 2.02 10*3/MM3 (ref 0.7–3.1)
LYMPHOCYTES # BLD AUTO: 2.24 10*3/MM3 (ref 0.7–3.1)
LYMPHOCYTES # BLD AUTO: 2.38 10*3/MM3 (ref 0.7–3.1)
LYMPHOCYTES NFR BLD AUTO: 13.1 % (ref 19.6–45.3)
LYMPHOCYTES NFR BLD AUTO: 14.3 % (ref 19.6–45.3)
LYMPHOCYTES NFR BLD AUTO: 19.8 % (ref 19.6–45.3)
LYMPHOCYTES NFR BLD AUTO: 22.6 % (ref 19.6–45.3)
M PNEUMO IGG SER IA-ACNC: NOT DETECTED
MAGNESIUM SERPL-MCNC: 2.3 MG/DL (ref 1.7–2.3)
MCH RBC QN AUTO: 28.9 PG (ref 26.6–33)
MCH RBC QN AUTO: 29.1 PG (ref 26.6–33)
MCH RBC QN AUTO: 29.5 PG (ref 26.6–33)
MCH RBC QN AUTO: 29.5 PG (ref 26.6–33)
MCHC RBC AUTO-ENTMCNC: 32.8 G/DL (ref 31.5–35.7)
MCHC RBC AUTO-ENTMCNC: 33 G/DL (ref 31.5–35.7)
MCHC RBC AUTO-ENTMCNC: 33 G/DL (ref 31.5–35.7)
MCHC RBC AUTO-ENTMCNC: 33.1 G/DL (ref 31.5–35.7)
MCV RBC AUTO: 87.4 FL (ref 79–97)
MCV RBC AUTO: 88.9 FL (ref 79–97)
MCV RBC AUTO: 89.4 FL (ref 79–97)
MCV RBC AUTO: 89.4 FL (ref 79–97)
MONOCYTES # BLD AUTO: 0.89 10*3/MM3 (ref 0.1–0.9)
MONOCYTES # BLD AUTO: 1.04 10*3/MM3 (ref 0.1–0.9)
MONOCYTES # BLD AUTO: 1.13 10*3/MM3 (ref 0.1–0.9)
MONOCYTES # BLD AUTO: 1.22 10*3/MM3 (ref 0.1–0.9)
MONOCYTES NFR BLD AUTO: 8 % (ref 5–12)
MONOCYTES NFR BLD AUTO: 8.5 % (ref 5–12)
MONOCYTES NFR BLD AUTO: 9.1 % (ref 5–12)
MONOCYTES NFR BLD AUTO: 9.2 % (ref 5–12)
NEUTROPHILS NFR BLD AUTO: 10.21 10*3/MM3 (ref 1.7–7)
NEUTROPHILS NFR BLD AUTO: 10.65 10*3/MM3 (ref 1.7–7)
NEUTROPHILS NFR BLD AUTO: 6.83 10*3/MM3 (ref 1.7–7)
NEUTROPHILS NFR BLD AUTO: 64.9 % (ref 42.7–76)
NEUTROPHILS NFR BLD AUTO: 66.4 % (ref 42.7–76)
NEUTROPHILS NFR BLD AUTO: 7.53 10*3/MM3 (ref 1.7–7)
NEUTROPHILS NFR BLD AUTO: 75.3 % (ref 42.7–76)
NEUTROPHILS NFR BLD AUTO: 75.7 % (ref 42.7–76)
NITRITE UR QL STRIP: NEGATIVE
NRBC BLD AUTO-RTO: 0 /100 WBC (ref 0–0.2)
NRBC BLD AUTO-RTO: 0.1 /100 WBC (ref 0–0.2)
NT-PROBNP SERPL-MCNC: 1739 PG/ML (ref 0–1800)
PH UR STRIP.AUTO: 6.5 [PH] (ref 5–8)
PLATELET # BLD AUTO: 260 10*3/MM3 (ref 140–450)
PLATELET # BLD AUTO: 271 10*3/MM3 (ref 140–450)
PLATELET # BLD AUTO: 293 10*3/MM3 (ref 140–450)
PLATELET # BLD AUTO: 323 10*3/MM3 (ref 140–450)
PMV BLD AUTO: 10.1 FL (ref 6–12)
PMV BLD AUTO: 10.2 FL (ref 6–12)
PMV BLD AUTO: 10.3 FL (ref 6–12)
PMV BLD AUTO: 10.7 FL (ref 6–12)
POTASSIUM SERPL-SCNC: 3.8 MMOL/L (ref 3.5–5.2)
POTASSIUM SERPL-SCNC: 3.9 MMOL/L (ref 3.5–5.2)
POTASSIUM SERPL-SCNC: 4.3 MMOL/L (ref 3.5–5.2)
POTASSIUM SERPL-SCNC: 4.5 MMOL/L (ref 3.5–5.2)
PROT SERPL-MCNC: 6.8 G/DL (ref 6–8.5)
PROT SERPL-MCNC: 6.9 G/DL (ref 6–8.5)
PROT UR QL STRIP: ABNORMAL
QT INTERVAL: 362 MS
RBC # BLD AUTO: 4.14 10*6/MM3 (ref 3.77–5.28)
RBC # BLD AUTO: 4.24 10*6/MM3 (ref 3.77–5.28)
RBC # BLD AUTO: 4.67 10*6/MM3 (ref 3.77–5.28)
RBC # BLD AUTO: 4.7 10*6/MM3 (ref 3.77–5.28)
RBC # UR: ABNORMAL /HPF
REF LAB TEST METHOD: ABNORMAL
RHINOVIRUS RNA SPEC NAA+PROBE: NOT DETECTED
RSV RNA NPH QL NAA+NON-PROBE: NOT DETECTED
SARS-COV-2 RNA NPH QL NAA+NON-PROBE: NOT DETECTED
SODIUM SERPL-SCNC: 140 MMOL/L (ref 136–145)
SODIUM SERPL-SCNC: 140 MMOL/L (ref 136–145)
SODIUM SERPL-SCNC: 141 MMOL/L (ref 136–145)
SODIUM SERPL-SCNC: 142 MMOL/L (ref 136–145)
SP GR UR STRIP: 1.02 (ref 1–1.03)
SQUAMOUS #/AREA URNS HPF: ABNORMAL /HPF
T4 FREE SERPL-MCNC: 1.03 NG/DL (ref 0.93–1.7)
TRIGL SERPL-MCNC: 74 MG/DL (ref 0–150)
TROPONIN T SERPL-MCNC: <0.01 NG/ML (ref 0–0.03)
TSH SERPL DL<=0.05 MIU/L-ACNC: 0.54 UIU/ML (ref 0.27–4.2)
UROBILINOGEN UR QL STRIP: ABNORMAL
VLDLC SERPL-MCNC: 15 MG/DL (ref 5–40)
WBC # BLD AUTO: 10.53 10*3/MM3 (ref 3.4–10.8)
WBC # BLD AUTO: 11.32 10*3/MM3 (ref 3.4–10.8)
WBC # BLD AUTO: 13.48 10*3/MM3 (ref 3.4–10.8)
WBC # BLD AUTO: 14.13 10*3/MM3 (ref 3.4–10.8)
WBC UR QL AUTO: ABNORMAL /HPF
WHOLE BLOOD HOLD SPECIMEN: NORMAL
WHOLE BLOOD HOLD SPECIMEN: NORMAL

## 2020-01-01 PROCEDURE — 80048 BASIC METABOLIC PNL TOTAL CA: CPT | Performed by: HOSPITALIST

## 2020-01-01 PROCEDURE — 85025 COMPLETE CBC W/AUTO DIFF WBC: CPT | Performed by: HOSPITALIST

## 2020-01-01 PROCEDURE — 83036 HEMOGLOBIN GLYCOSYLATED A1C: CPT | Performed by: HOSPITALIST

## 2020-01-01 PROCEDURE — 25010000002 CEFTRIAXONE PER 250 MG: Performed by: EMERGENCY MEDICINE

## 2020-01-01 PROCEDURE — 87186 SC STD MICRODIL/AGAR DIL: CPT | Performed by: EMERGENCY MEDICINE

## 2020-01-01 PROCEDURE — 25010000002 CEFTRIAXONE PER 250 MG: Performed by: HOSPITALIST

## 2020-01-01 PROCEDURE — 83735 ASSAY OF MAGNESIUM: CPT | Performed by: EMERGENCY MEDICINE

## 2020-01-01 PROCEDURE — 97530 THERAPEUTIC ACTIVITIES: CPT

## 2020-01-01 PROCEDURE — 97110 THERAPEUTIC EXERCISES: CPT

## 2020-01-01 PROCEDURE — 0202U NFCT DS 22 TRGT SARS-COV-2: CPT | Performed by: EMERGENCY MEDICINE

## 2020-01-01 PROCEDURE — 83880 ASSAY OF NATRIURETIC PEPTIDE: CPT | Performed by: HOSPITALIST

## 2020-01-01 PROCEDURE — 97166 OT EVAL MOD COMPLEX 45 MIN: CPT

## 2020-01-01 PROCEDURE — 71045 X-RAY EXAM CHEST 1 VIEW: CPT

## 2020-01-01 PROCEDURE — 84443 ASSAY THYROID STIM HORMONE: CPT | Performed by: HOSPITALIST

## 2020-01-01 PROCEDURE — 87077 CULTURE AEROBIC IDENTIFY: CPT | Performed by: EMERGENCY MEDICINE

## 2020-01-01 PROCEDURE — 80061 LIPID PANEL: CPT | Performed by: HOSPITALIST

## 2020-01-01 PROCEDURE — 93005 ELECTROCARDIOGRAM TRACING: CPT | Performed by: EMERGENCY MEDICINE

## 2020-01-01 PROCEDURE — 93010 ELECTROCARDIOGRAM REPORT: CPT | Performed by: INTERNAL MEDICINE

## 2020-01-01 PROCEDURE — 82962 GLUCOSE BLOOD TEST: CPT

## 2020-01-01 PROCEDURE — 85025 COMPLETE CBC W/AUTO DIFF WBC: CPT | Performed by: EMERGENCY MEDICINE

## 2020-01-01 PROCEDURE — 80053 COMPREHEN METABOLIC PANEL: CPT | Performed by: EMERGENCY MEDICINE

## 2020-01-01 PROCEDURE — 70450 CT HEAD/BRAIN W/O DYE: CPT

## 2020-01-01 PROCEDURE — P9612 CATHETERIZE FOR URINE SPEC: HCPCS

## 2020-01-01 PROCEDURE — 90686 IIV4 VACC NO PRSV 0.5 ML IM: CPT | Performed by: HOSPITALIST

## 2020-01-01 PROCEDURE — 84484 ASSAY OF TROPONIN QUANT: CPT | Performed by: EMERGENCY MEDICINE

## 2020-01-01 PROCEDURE — 25010000002 VANCOMYCIN 10 G RECONSTITUTED SOLUTION: Performed by: HOSPITALIST

## 2020-01-01 PROCEDURE — 99285 EMERGENCY DEPT VISIT HI MDM: CPT

## 2020-01-01 PROCEDURE — 81001 URINALYSIS AUTO W/SCOPE: CPT | Performed by: EMERGENCY MEDICINE

## 2020-01-01 PROCEDURE — G0008 ADMIN INFLUENZA VIRUS VAC: HCPCS | Performed by: HOSPITALIST

## 2020-01-01 PROCEDURE — 36415 COLL VENOUS BLD VENIPUNCTURE: CPT | Performed by: HOSPITALIST

## 2020-01-01 PROCEDURE — 80053 COMPREHEN METABOLIC PANEL: CPT | Performed by: HOSPITALIST

## 2020-01-01 PROCEDURE — 84439 ASSAY OF FREE THYROXINE: CPT | Performed by: HOSPITALIST

## 2020-01-01 PROCEDURE — 87086 URINE CULTURE/COLONY COUNT: CPT | Performed by: EMERGENCY MEDICINE

## 2020-01-01 PROCEDURE — 25010000002 INFLUENZA VAC SPLIT QUAD 0.5 ML SUSPENSION PREFILLED SYRINGE: Performed by: HOSPITALIST

## 2020-01-01 PROCEDURE — 97162 PT EVAL MOD COMPLEX 30 MIN: CPT

## 2020-01-01 RX ORDER — CARVEDILOL 3.12 MG/1
3.12 TABLET ORAL 2 TIMES DAILY WITH MEALS
Status: DISCONTINUED | OUTPATIENT
Start: 2020-01-01 | End: 2020-01-01

## 2020-01-01 RX ORDER — MULTIPLE VITAMINS W/ MINERALS TAB 9MG-400MCG
1 TAB ORAL DAILY
COMMUNITY
End: 2020-01-01 | Stop reason: HOSPADM

## 2020-01-01 RX ORDER — CARVEDILOL 6.25 MG/1
6.25 TABLET ORAL 2 TIMES DAILY WITH MEALS
Qty: 60 TABLET | Refills: 0 | Status: SHIPPED | OUTPATIENT
Start: 2020-01-01 | End: 2021-01-01

## 2020-01-01 RX ORDER — DONEPEZIL HYDROCHLORIDE 10 MG/1
10 TABLET, FILM COATED ORAL NIGHTLY
Status: DISCONTINUED | OUTPATIENT
Start: 2020-01-01 | End: 2020-01-01 | Stop reason: HOSPADM

## 2020-01-01 RX ORDER — ATORVASTATIN CALCIUM 10 MG/1
10 TABLET, FILM COATED ORAL NIGHTLY
Status: DISCONTINUED | OUTPATIENT
Start: 2020-01-01 | End: 2020-01-01 | Stop reason: HOSPADM

## 2020-01-01 RX ORDER — CARVEDILOL 6.25 MG/1
6.25 TABLET ORAL 2 TIMES DAILY WITH MEALS
Status: DISCONTINUED | OUTPATIENT
Start: 2020-01-01 | End: 2020-01-01 | Stop reason: HOSPADM

## 2020-01-01 RX ORDER — PANTOPRAZOLE SODIUM 40 MG/1
40 TABLET, DELAYED RELEASE ORAL DAILY
Qty: 30 TABLET | Refills: 0 | Status: SHIPPED | OUTPATIENT
Start: 2020-01-01 | End: 2020-01-01

## 2020-01-01 RX ORDER — DOXEPIN HYDROCHLORIDE 25 MG/1
25 CAPSULE ORAL NIGHTLY
Status: DISCONTINUED | OUTPATIENT
Start: 2020-01-01 | End: 2020-01-01 | Stop reason: HOSPADM

## 2020-01-01 RX ORDER — AMPICILLIN 500 MG/1
500 CAPSULE ORAL EVERY 6 HOURS SCHEDULED
Qty: 17 CAPSULE | Refills: 0 | Status: SHIPPED | OUTPATIENT
Start: 2020-01-01 | End: 2020-01-01

## 2020-01-01 RX ORDER — AMPICILLIN 500 MG/1
500 CAPSULE ORAL EVERY 6 HOURS SCHEDULED
Status: DISCONTINUED | OUTPATIENT
Start: 2020-01-01 | End: 2020-01-01 | Stop reason: HOSPADM

## 2020-01-01 RX ORDER — PANTOPRAZOLE SODIUM 40 MG/1
40 TABLET, DELAYED RELEASE ORAL
Status: DISCONTINUED | OUTPATIENT
Start: 2020-01-01 | End: 2020-01-01 | Stop reason: HOSPADM

## 2020-01-01 RX ORDER — LORAZEPAM 1 MG/1
2 TABLET ORAL DAILY
Status: DISCONTINUED | OUTPATIENT
Start: 2020-01-01 | End: 2020-01-01

## 2020-01-01 RX ORDER — QUETIAPINE FUMARATE 25 MG/1
25 TABLET, FILM COATED ORAL NIGHTLY
Status: DISCONTINUED | OUTPATIENT
Start: 2020-01-01 | End: 2020-01-01 | Stop reason: HOSPADM

## 2020-01-01 RX ORDER — SACCHAROMYCES BOULARDII 250 MG
250 CAPSULE ORAL DAILY
COMMUNITY

## 2020-01-01 RX ORDER — LEVOTHYROXINE SODIUM 0.05 MG/1
50 TABLET ORAL DAILY
Status: DISCONTINUED | OUTPATIENT
Start: 2020-01-01 | End: 2020-01-01 | Stop reason: HOSPADM

## 2020-01-01 RX ORDER — PAROXETINE HYDROCHLORIDE 20 MG/1
20 TABLET, FILM COATED ORAL DAILY
Status: DISCONTINUED | OUTPATIENT
Start: 2020-01-01 | End: 2020-01-01 | Stop reason: HOSPADM

## 2020-01-01 RX ORDER — LEVOTHYROXINE SODIUM 0.07 MG/1
75 TABLET ORAL DAILY
Status: DISCONTINUED | OUTPATIENT
Start: 2020-01-01 | End: 2020-01-01

## 2020-01-01 RX ORDER — CEFTRIAXONE SODIUM 1 G/50ML
1 INJECTION, SOLUTION INTRAVENOUS ONCE
Status: COMPLETED | OUTPATIENT
Start: 2020-01-01 | End: 2020-01-01

## 2020-01-01 RX ORDER — LORAZEPAM 0.5 MG/1
0.5 TABLET ORAL EVERY 6 HOURS PRN
Status: DISCONTINUED | OUTPATIENT
Start: 2020-01-01 | End: 2020-01-01

## 2020-01-01 RX ORDER — ATORVASTATIN CALCIUM 20 MG/1
20 TABLET, FILM COATED ORAL DAILY
Status: DISCONTINUED | OUTPATIENT
Start: 2020-01-01 | End: 2020-01-01

## 2020-01-01 RX ORDER — CEFTRIAXONE SODIUM 1 G/50ML
1 INJECTION, SOLUTION INTRAVENOUS EVERY 24 HOURS
Status: DISCONTINUED | OUTPATIENT
Start: 2020-01-01 | End: 2020-01-01

## 2020-01-01 RX ORDER — SODIUM CHLORIDE 0.9 % (FLUSH) 0.9 %
10 SYRINGE (ML) INJECTION AS NEEDED
Status: DISCONTINUED | OUTPATIENT
Start: 2020-01-01 | End: 2020-01-01

## 2020-01-01 RX ADMIN — AMPICILLIN 500 MG: 500 CAPSULE ORAL at 06:19

## 2020-01-01 RX ADMIN — PANTOPRAZOLE SODIUM 40 MG: 40 TABLET, DELAYED RELEASE ORAL at 06:19

## 2020-01-01 RX ADMIN — PAROXETINE HYDROCHLORIDE HEMIHYDRATE 20 MG: 20 TABLET, FILM COATED ORAL at 09:14

## 2020-01-01 RX ADMIN — APIXABAN 2.5 MG: 2.5 TABLET, FILM COATED ORAL at 09:04

## 2020-01-01 RX ADMIN — VANCOMYCIN HYDROCHLORIDE 1500 MG: 10 INJECTION, POWDER, LYOPHILIZED, FOR SOLUTION INTRAVENOUS at 14:08

## 2020-01-01 RX ADMIN — INFLUENZA VIRUS VACCINE 0.5 ML: 15; 15; 15; 15 SUSPENSION INTRAMUSCULAR at 12:49

## 2020-01-01 RX ADMIN — CARVEDILOL 3.12 MG: 3.12 TABLET, FILM COATED ORAL at 08:42

## 2020-01-01 RX ADMIN — PAROXETINE HYDROCHLORIDE HEMIHYDRATE 20 MG: 20 TABLET, FILM COATED ORAL at 08:42

## 2020-01-01 RX ADMIN — LEVOTHYROXINE SODIUM 75 MCG: 75 TABLET ORAL at 09:03

## 2020-01-01 RX ADMIN — CEFTRIAXONE SODIUM 1 G: 1 INJECTION, SOLUTION INTRAVENOUS at 00:36

## 2020-01-01 RX ADMIN — APIXABAN 2.5 MG: 2.5 TABLET, FILM COATED ORAL at 23:05

## 2020-01-01 RX ADMIN — QUETIAPINE FUMARATE 25 MG: 25 TABLET ORAL at 23:06

## 2020-01-01 RX ADMIN — APIXABAN 2.5 MG: 2.5 TABLET, FILM COATED ORAL at 08:42

## 2020-01-01 RX ADMIN — AMPICILLIN 500 MG: 500 CAPSULE ORAL at 11:42

## 2020-01-01 RX ADMIN — CEFTRIAXONE SODIUM 1 G: 1 INJECTION, SOLUTION INTRAVENOUS at 00:32

## 2020-01-01 RX ADMIN — CARVEDILOL 3.12 MG: 3.12 TABLET, FILM COATED ORAL at 09:03

## 2020-01-01 RX ADMIN — LEVOTHYROXINE SODIUM 75 MCG: 75 TABLET ORAL at 08:42

## 2020-01-01 RX ADMIN — CEFTRIAXONE SODIUM 1 G: 1 INJECTION, SOLUTION INTRAVENOUS at 00:42

## 2020-01-01 RX ADMIN — PAROXETINE HYDROCHLORIDE HEMIHYDRATE 20 MG: 20 TABLET, FILM COATED ORAL at 09:03

## 2020-01-01 RX ADMIN — DOXEPIN HYDROCHLORIDE 25 MG: 25 CAPSULE ORAL at 19:13

## 2020-01-01 RX ADMIN — DONEPEZIL HYDROCHLORIDE 10 MG: 10 TABLET, FILM COATED ORAL at 23:05

## 2020-01-01 RX ADMIN — VANCOMYCIN HYDROCHLORIDE 1250 MG: 10 INJECTION, POWDER, LYOPHILIZED, FOR SOLUTION INTRAVENOUS at 14:35

## 2020-01-01 RX ADMIN — ATORVASTATIN CALCIUM 10 MG: 10 TABLET, FILM COATED ORAL at 23:05

## 2020-01-01 RX ADMIN — APIXABAN 2.5 MG: 2.5 TABLET, FILM COATED ORAL at 19:13

## 2020-01-01 RX ADMIN — CARVEDILOL 6.25 MG: 6.25 TABLET, FILM COATED ORAL at 17:11

## 2020-01-01 RX ADMIN — ATORVASTATIN CALCIUM 10 MG: 10 TABLET, FILM COATED ORAL at 19:13

## 2020-01-01 RX ADMIN — DONEPEZIL HYDROCHLORIDE 10 MG: 10 TABLET, FILM COATED ORAL at 19:13

## 2020-01-01 RX ADMIN — AMPICILLIN 500 MG: 500 CAPSULE ORAL at 01:33

## 2020-01-01 RX ADMIN — PANTOPRAZOLE SODIUM 40 MG: 40 TABLET, DELAYED RELEASE ORAL at 05:50

## 2020-01-01 RX ADMIN — LEVOTHYROXINE SODIUM 50 MCG: 50 TABLET ORAL at 09:14

## 2020-01-01 RX ADMIN — QUETIAPINE FUMARATE 25 MG: 25 TABLET ORAL at 19:13

## 2020-01-01 RX ADMIN — APIXABAN 2.5 MG: 2.5 TABLET, FILM COATED ORAL at 09:14

## 2020-01-01 RX ADMIN — CARVEDILOL 6.25 MG: 6.25 TABLET, FILM COATED ORAL at 09:14

## 2020-01-01 RX ADMIN — DOXEPIN HYDROCHLORIDE 25 MG: 25 CAPSULE ORAL at 23:05

## 2020-01-01 RX ADMIN — CARVEDILOL 3.12 MG: 3.12 TABLET, FILM COATED ORAL at 17:48

## 2020-02-20 ENCOUNTER — OFFICE VISIT (OUTPATIENT)
Dept: NEUROLOGY | Facility: CLINIC | Age: 85
End: 2020-02-20

## 2020-02-20 VITALS
DIASTOLIC BLOOD PRESSURE: 66 MMHG | OXYGEN SATURATION: 99 % | HEART RATE: 69 BPM | BODY MASS INDEX: 26.21 KG/M2 | SYSTOLIC BLOOD PRESSURE: 112 MMHG | HEIGHT: 67 IN | WEIGHT: 167 LBS

## 2020-02-20 DIAGNOSIS — E78.2 MIXED HYPERLIPIDEMIA: ICD-10-CM

## 2020-02-20 DIAGNOSIS — I48.0 PAROXYSMAL ATRIAL FIBRILLATION (HCC): ICD-10-CM

## 2020-02-20 DIAGNOSIS — I67.2 ATHEROSCLEROTIC CEREBROVASCULAR DISEASE: ICD-10-CM

## 2020-02-20 DIAGNOSIS — R40.4 EPISODE OF ALTERED CONSCIOUSNESS: ICD-10-CM

## 2020-02-20 DIAGNOSIS — F03.90 DEMENTIA WITHOUT BEHAVIORAL DISTURBANCE, UNSPECIFIED DEMENTIA TYPE: ICD-10-CM

## 2020-02-20 DIAGNOSIS — I63.412 CEREBROVASCULAR ACCIDENT (CVA) DUE TO EMBOLISM OF LEFT MIDDLE CEREBRAL ARTERY (HCC): Primary | ICD-10-CM

## 2020-02-20 PROCEDURE — 99215 OFFICE O/P EST HI 40 MIN: CPT | Performed by: NURSE PRACTITIONER

## 2020-02-20 RX ORDER — LEVOTHYROXINE SODIUM 0.05 MG/1
75 TABLET ORAL DAILY
COMMUNITY
Start: 2020-02-18 | End: 2020-06-18 | Stop reason: HOSPADM

## 2020-02-20 NOTE — PROGRESS NOTES
"DOS: 2020  NAME: Christianne Ordonez   : 1929  PCP: Ray Torre MD    Chief Complaint   Patient presents with   • Stroke   • Dementia      SUBJECTIVE  Neurological Problem:  89 y.o. LHW female with Afib, HLD, HTN, dementia and stroke. She presents today for f/u of stroke and memory loss. She is accompanied by her granddaughter and her young daughter.     Interval History:   **For full history please see progress notes dated 19.    Ms. Ordonez has a h/o a large left MCA stroke she suffered in  due to new onset A. fib. She has since been maintained on Eliquis with no recurrent or new stroke/TIA symptoms.  She does however have cognitive impairment that was present to some degree prior to her stroke but with progression following her event.  She is currently being treated with Aricept 10 mg qd. There has been to significant progression of memory issues.     She continues to live with her son who assists with patient's care as does her granddaughter. The patient tells me that she sometimes hears music that plays over and over, only when it is quiet and usually when she is unable to go to sleep. She denies any visual hallucinations. Her sleep habits are variable adnd she evidently takes frequent long naps, rarely leaves the home, does not get much exercise. The granddaugher then mentions that the patient had an episode on Kassandra day where she \"was out of it\" and apparently urinated on herself. They are vague about this event as the granddaughter was not present as she was out of town but the son called her. The patient is a poor historian and does not have a clear recollection of events. They think it lasted only several minutes, may have occurred after she went to stand up, did not call EMS or go to ER, no other associated symptoms, no fall, no reported shaking, uncertain about LOC. Has not happened before and not since. They deny any other changes in her health. No past history of " seizures.    Review of Systems:Review of Systems   Constitutional: Negative for activity change, appetite change and fatigue.   HENT: Positive for tinnitus. Negative for ear pain, facial swelling and trouble swallowing.    Eyes: Positive for visual disturbance (left eye). Negative for photophobia and pain.   Musculoskeletal: Positive for gait problem. Negative for back pain and neck pain.   Allergic/Immunologic: Negative for environmental allergies, food allergies and immunocompromised state.   Neurological: Positive for dizziness, speech difficulty and light-headedness. Negative for tremors, seizures, syncope, facial asymmetry, weakness, numbness and headaches.   Hematological: Negative for adenopathy. Bruises/bleeds easily.   Psychiatric/Behavioral: Positive for agitation, confusion and hallucinations (auditory). Negative for behavioral problems, decreased concentration, dysphoric mood, self-injury, sleep disturbance and suicidal ideas. The patient is not nervous/anxious and is not hyperactive.     Above ROS reviewed    The following portions of the patient's history were reviewed and updated as appropriate: allergies, current medications, past family history, past medical history, past social history, past surgical history and problem list.    Current Medications:   Current Outpatient Medications:   •  apixaban (ELIQUIS) 2.5 MG tablet tablet, Take 1 tablet by mouth 2 (Two) Times a Day., Disp: 180 tablet, Rfl: 1  •  atorvastatin (LIPITOR) 20 MG tablet, Take 20 mg by mouth Daily., Disp: , Rfl: 3  •  carvedilol (COREG) 3.125 MG tablet, Take 3.125 mg by mouth 2 (two) times a day with meals., Disp: , Rfl:   •  donepezil (ARICEPT) 10 MG tablet, Take 1 tablet by mouth Every Night., Disp: 30 tablet, Rfl: 5  •  doxepin (SINEquan) 25 MG capsule, TK 1 C PO Q NIGHT, Disp: , Rfl: 3  •  levothyroxine (SYNTHROID, LEVOTHROID) 50 MCG tablet, , Disp: , Rfl:   •  LORazepam (ATIVAN) 0.5 MG tablet, , Disp: , Rfl:   •  PARoxetine  "(PAXIL) 20 MG tablet, TAKE ONE TABLET BY MOUTH ONCE DAILY, Disp: 90 tablet, Rfl: 0  •  QUEtiapine (SEROquel) 25 MG tablet, Take 25 mg by mouth Every Night., Disp: , Rfl:       OBJECTIVE  Vitals:    02/20/20 1535   BP: 112/66   Pulse: 69   SpO2: 99%     Body mass index is 26.15 kg/m².    Diagnostics:    Laboratory Results:         Lab Results   Component Value Date    WBC 11.35 (H) 05/29/2019    HGB 10.9 (L) 05/29/2019    HCT 35.5 05/29/2019    MCV 96.2 05/29/2019     05/29/2019     Lab Results   Component Value Date    GLUCOSE 88 05/29/2019    BUN 13 05/29/2019    CREATININE 0.70 05/29/2019    EGFRIFNONA 79 05/29/2019    BCR 18.6 05/29/2019    K 3.8 05/29/2019    CO2 32.2 (H) 05/29/2019    CALCIUM 8.5 05/29/2019    ALBUMIN 2.90 (L) 05/26/2019    LABIL2 1.4 09/25/2018    AST 21 05/26/2019    ALT 14 05/26/2019     Lab Results   Component Value Date    HGBA1C 5.60 05/26/2019     Lab Results   Component Value Date    CHOL 112 05/26/2019     Lab Results   Component Value Date    HDL 45 05/26/2019    HDL 44 (L) 09/25/2018     Lab Results   Component Value Date    LDL 52 05/26/2019    LDL 72 09/25/2018     Lab Results   Component Value Date    TRIG 76 05/26/2019    TRIG 109 09/25/2018     No results found for: RPR  Lab Results   Component Value Date    TSH 10.800 (H) 05/26/2019     No results found for: [id number]    Physical Examination:   General Appearance:   Well developed, well nourished, well groomed, alert, and cooperative.  HEENT: Normocephalic.    Neck and Spine: Normal range of motion.  Normal alignment. No mass or tenderness. No bruits.  Cardiac: Regular rate and rhythm.   Peripheral Vasculature: Radial pulses are equal and symmetric. No signs of distal embolization.  Extremities:    No edema or deformities. Normal joint ROM.  Skin:    No rashes or birth marks.  Psychiatric:    Normal mood and affect.    Neurological:   MS: Oriented to person, month, year, family, \"[place]\". Language normal. Naming, " repetition, reading intact. No neglect. Follows commands  CN: II-XII normal except for decreased auditory acuity  Motor: Normal strength and tone throughout.  Sensory: Intact to light touch in arms and legs.  Station and Gait: Cautious, wide-based gait, mildly antalgic, utlizing roallator   Coordination: Normal finger to nose bilaterally.    Impression:  Ms. Ordonez presents for f/u of LMCA stroke and dementia. In regards to stroke, etiology likely cardioembolic d/t new onset afib and she has been maintained on Eliquis 2.5 mg BID with no new focal symptoms. She did have a reported episode of altered awareness of uncertain etiology but seizure and possible TIA, possibly related to hypoperfusion are considerations. Given the vague description and circumstances, would not change medications at this point but would check an EEG, monitor BP, keep well hydrated and avoid hypotension. In regards to dementia, she is stable on aricept, continue the same. She may benefit from f/u with ENT, audiology eval. She will f/u in one year, sooner if symptoms warrant. Patient and granddaughter voiced understanding and agree with above plan.      Plan:   1. Episode of altered awareness    Check an EEG  Monitor BP regularly -- keep well hydrated  Transtiion slowly    2. Stroke  Continue current medication regimen  Monitor BP regularly  F/U with PCP for continued cholesterol surveillance  Keep well hydrated  Secondary stroke prevention: Ideal targets for stroke prevention would be Blood pressure < 130/80; B12 > 500 TSH in normal range and LDL < 70; HbA1c < 6.5 and smoking cessation if applicable.  Call 911 for stroke symptomsI    3. Dementia  Continue Aricept 10 mg  Encouraged regular physical, mental activity as able  Falls prevention    I spent 40 minutes face to face with patient, with  > 50% spent counseling patient regarding diagnosis, review of diagnostics, personal risk factors for stroke and importance of risk factor control for  stroke prevention, including lifestyle modifications and preventative measures.  Christianne was seen today for stroke and dementia.    Diagnoses and all orders for this visit:    Cerebrovascular accident (CVA) due to embolism of left middle cerebral artery (CMS/HCC)    Dementia without behavioral disturbance, unspecified dementia type (CMS/HCC)    Episode of altered consciousness  -     EEG (Hospital Performed); Future    Paroxysmal atrial fibrillation (CMS/HCC)    Atherosclerotic cerebrovascular disease    Mixed hyperlipidemia        Coding      Dictated using Dragon

## 2020-06-11 ENCOUNTER — APPOINTMENT (OUTPATIENT)
Dept: GENERAL RADIOLOGY | Facility: HOSPITAL | Age: 85
End: 2020-06-11

## 2020-06-11 ENCOUNTER — HOSPITAL ENCOUNTER (INPATIENT)
Facility: HOSPITAL | Age: 85
LOS: 6 days | Discharge: HOME HEALTH | End: 2020-06-18
Attending: EMERGENCY MEDICINE | Admitting: ORTHOPAEDIC SURGERY
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
## 2020-06-11 DIAGNOSIS — R47.01 ACQUIRED APHASIA: ICD-10-CM

## 2020-06-11 DIAGNOSIS — S72.001A CLOSED FRACTURE OF RIGHT HIP, INITIAL ENCOUNTER (HCC): Primary | ICD-10-CM

## 2020-06-11 PROCEDURE — 73502 X-RAY EXAM HIP UNI 2-3 VIEWS: CPT

## 2020-06-11 PROCEDURE — 71045 X-RAY EXAM CHEST 1 VIEW: CPT

## 2020-06-11 PROCEDURE — 93005 ELECTROCARDIOGRAM TRACING: CPT | Performed by: EMERGENCY MEDICINE

## 2020-06-11 PROCEDURE — 84484 ASSAY OF TROPONIN QUANT: CPT | Performed by: EMERGENCY MEDICINE

## 2020-06-11 PROCEDURE — 25010000002 HYDROMORPHONE PER 4 MG: Performed by: EMERGENCY MEDICINE

## 2020-06-11 PROCEDURE — 99285 EMERGENCY DEPT VISIT HI MDM: CPT

## 2020-06-11 PROCEDURE — 85025 COMPLETE CBC W/AUTO DIFF WBC: CPT | Performed by: EMERGENCY MEDICINE

## 2020-06-11 PROCEDURE — 86900 BLOOD TYPING SEROLOGIC ABO: CPT | Performed by: EMERGENCY MEDICINE

## 2020-06-11 PROCEDURE — 80053 COMPREHEN METABOLIC PANEL: CPT | Performed by: EMERGENCY MEDICINE

## 2020-06-11 PROCEDURE — 86850 RBC ANTIBODY SCREEN: CPT | Performed by: EMERGENCY MEDICINE

## 2020-06-11 PROCEDURE — 86901 BLOOD TYPING SEROLOGIC RH(D): CPT | Performed by: EMERGENCY MEDICINE

## 2020-06-11 PROCEDURE — 85730 THROMBOPLASTIN TIME PARTIAL: CPT | Performed by: EMERGENCY MEDICINE

## 2020-06-11 PROCEDURE — 85610 PROTHROMBIN TIME: CPT | Performed by: EMERGENCY MEDICINE

## 2020-06-11 RX ORDER — HYDROMORPHONE HYDROCHLORIDE 1 MG/ML
0.5 INJECTION, SOLUTION INTRAMUSCULAR; INTRAVENOUS; SUBCUTANEOUS ONCE
Status: COMPLETED | OUTPATIENT
Start: 2020-06-11 | End: 2020-06-11

## 2020-06-11 RX ORDER — SODIUM CHLORIDE 0.9 % (FLUSH) 0.9 %
10 SYRINGE (ML) INJECTION AS NEEDED
Status: DISCONTINUED | OUTPATIENT
Start: 2020-06-11 | End: 2020-06-18 | Stop reason: HOSPADM

## 2020-06-11 RX ADMIN — HYDROMORPHONE HYDROCHLORIDE 0.5 MG: 1 INJECTION, SOLUTION INTRAMUSCULAR; INTRAVENOUS; SUBCUTANEOUS at 23:51

## 2020-06-12 ENCOUNTER — APPOINTMENT (OUTPATIENT)
Dept: CT IMAGING | Facility: HOSPITAL | Age: 85
End: 2020-06-12

## 2020-06-12 PROBLEM — S72.001A CLOSED FRACTURE OF RIGHT HIP (HCC): Status: ACTIVE | Noted: 2020-06-12

## 2020-06-12 LAB
ABO GROUP BLD: NORMAL
ALBUMIN SERPL-MCNC: 4.1 G/DL (ref 3.5–5.2)
ALBUMIN/GLOB SERPL: 1.5 G/DL
ALP SERPL-CCNC: 101 U/L (ref 39–117)
ALT SERPL W P-5'-P-CCNC: 24 U/L (ref 1–33)
ANION GAP SERPL CALCULATED.3IONS-SCNC: 7.6 MMOL/L (ref 5–15)
ANION GAP SERPL CALCULATED.3IONS-SCNC: 8.1 MMOL/L (ref 5–15)
APTT PPP: 37 SECONDS (ref 22.7–35.4)
AST SERPL-CCNC: 27 U/L (ref 1–32)
BACTERIA UR QL AUTO: ABNORMAL /HPF
BASOPHILS # BLD AUTO: 0.09 10*3/MM3 (ref 0–0.2)
BASOPHILS # BLD AUTO: 0.1 10*3/MM3 (ref 0–0.2)
BASOPHILS NFR BLD AUTO: 0.5 % (ref 0–1.5)
BASOPHILS NFR BLD AUTO: 1 % (ref 0–1.5)
BILIRUB SERPL-MCNC: 0.7 MG/DL (ref 0.2–1.2)
BILIRUB UR QL STRIP: NEGATIVE
BLD GP AB SCN SERPL QL: NEGATIVE
BUN BLD-MCNC: 17 MG/DL (ref 8–23)
BUN BLD-MCNC: 17 MG/DL (ref 8–23)
BUN/CREAT SERPL: 17.3 (ref 7–25)
BUN/CREAT SERPL: 18.7 (ref 7–25)
CALCIUM SPEC-SCNC: 9 MG/DL (ref 8.2–9.6)
CALCIUM SPEC-SCNC: 9.1 MG/DL (ref 8.2–9.6)
CHLORIDE SERPL-SCNC: 101 MMOL/L (ref 98–107)
CHLORIDE SERPL-SCNC: 102 MMOL/L (ref 98–107)
CLARITY UR: ABNORMAL
CO2 SERPL-SCNC: 29.9 MMOL/L (ref 22–29)
CO2 SERPL-SCNC: 31.4 MMOL/L (ref 22–29)
COLOR UR: YELLOW
CREAT BLD-MCNC: 0.91 MG/DL (ref 0.57–1)
CREAT BLD-MCNC: 0.98 MG/DL (ref 0.57–1)
DEPRECATED RDW RBC AUTO: 45.4 FL (ref 37–54)
DEPRECATED RDW RBC AUTO: 48.2 FL (ref 37–54)
EOSINOPHIL # BLD AUTO: 0.03 10*3/MM3 (ref 0–0.4)
EOSINOPHIL # BLD AUTO: 0.22 10*3/MM3 (ref 0–0.4)
EOSINOPHIL NFR BLD AUTO: 0.2 % (ref 0.3–6.2)
EOSINOPHIL NFR BLD AUTO: 2.1 % (ref 0.3–6.2)
ERYTHROCYTE [DISTWIDTH] IN BLOOD BY AUTOMATED COUNT: 13.2 % (ref 12.3–15.4)
ERYTHROCYTE [DISTWIDTH] IN BLOOD BY AUTOMATED COUNT: 13.5 % (ref 12.3–15.4)
GFR SERPL CREATININE-BSD FRML MDRD: 53 ML/MIN/1.73
GFR SERPL CREATININE-BSD FRML MDRD: 58 ML/MIN/1.73
GLOBULIN UR ELPH-MCNC: 2.7 GM/DL
GLUCOSE BLD-MCNC: 118 MG/DL (ref 65–99)
GLUCOSE BLD-MCNC: 120 MG/DL (ref 65–99)
GLUCOSE UR STRIP-MCNC: NEGATIVE MG/DL
HCT VFR BLD AUTO: 43.6 % (ref 34–46.6)
HCT VFR BLD AUTO: 44.3 % (ref 34–46.6)
HGB BLD-MCNC: 14.1 G/DL (ref 12–15.9)
HGB BLD-MCNC: 14.8 G/DL (ref 12–15.9)
HGB UR QL STRIP.AUTO: ABNORMAL
HYALINE CASTS UR QL AUTO: ABNORMAL /LPF
IMM GRANULOCYTES # BLD AUTO: 0.05 10*3/MM3 (ref 0–0.05)
IMM GRANULOCYTES # BLD AUTO: 0.11 10*3/MM3 (ref 0–0.05)
IMM GRANULOCYTES NFR BLD AUTO: 0.5 % (ref 0–0.5)
IMM GRANULOCYTES NFR BLD AUTO: 0.7 % (ref 0–0.5)
INR PPP: 1.27 (ref 0.9–1.1)
KETONES UR QL STRIP: ABNORMAL
LEUKOCYTE ESTERASE UR QL STRIP.AUTO: ABNORMAL
LYMPHOCYTES # BLD AUTO: 1.77 10*3/MM3 (ref 0.7–3.1)
LYMPHOCYTES # BLD AUTO: 2.33 10*3/MM3 (ref 0.7–3.1)
LYMPHOCYTES NFR BLD AUTO: 10.7 % (ref 19.6–45.3)
LYMPHOCYTES NFR BLD AUTO: 22.6 % (ref 19.6–45.3)
MCH RBC QN AUTO: 30.9 PG (ref 26.6–33)
MCH RBC QN AUTO: 31.4 PG (ref 26.6–33)
MCHC RBC AUTO-ENTMCNC: 32.3 G/DL (ref 31.5–35.7)
MCHC RBC AUTO-ENTMCNC: 33.4 G/DL (ref 31.5–35.7)
MCV RBC AUTO: 94.1 FL (ref 79–97)
MCV RBC AUTO: 95.6 FL (ref 79–97)
MONOCYTES # BLD AUTO: 0.54 10*3/MM3 (ref 0.1–0.9)
MONOCYTES # BLD AUTO: 0.99 10*3/MM3 (ref 0.1–0.9)
MONOCYTES NFR BLD AUTO: 5.2 % (ref 5–12)
MONOCYTES NFR BLD AUTO: 6 % (ref 5–12)
NEUTROPHILS # BLD AUTO: 13.59 10*3/MM3 (ref 1.7–7)
NEUTROPHILS # BLD AUTO: 7.09 10*3/MM3 (ref 1.7–7)
NEUTROPHILS NFR BLD AUTO: 68.6 % (ref 42.7–76)
NEUTROPHILS NFR BLD AUTO: 81.9 % (ref 42.7–76)
NITRITE UR QL STRIP: POSITIVE
NRBC BLD AUTO-RTO: 0 /100 WBC (ref 0–0.2)
NRBC BLD AUTO-RTO: 0 /100 WBC (ref 0–0.2)
PH UR STRIP.AUTO: <=5 [PH] (ref 5–8)
PLATELET # BLD AUTO: 274 10*3/MM3 (ref 140–450)
PLATELET # BLD AUTO: 284 10*3/MM3 (ref 140–450)
PMV BLD AUTO: 10.3 FL (ref 6–12)
PMV BLD AUTO: 10.5 FL (ref 6–12)
POTASSIUM BLD-SCNC: 4.5 MMOL/L (ref 3.5–5.2)
POTASSIUM BLD-SCNC: 5.6 MMOL/L (ref 3.5–5.2)
PROT SERPL-MCNC: 6.8 G/DL (ref 6–8.5)
PROT UR QL STRIP: NEGATIVE
PROTHROMBIN TIME: 15.6 SECONDS (ref 11.7–14.2)
RBC # BLD AUTO: 4.56 10*6/MM3 (ref 3.77–5.28)
RBC # BLD AUTO: 4.71 10*6/MM3 (ref 3.77–5.28)
RBC # UR: ABNORMAL /HPF
REF LAB TEST METHOD: ABNORMAL
RH BLD: POSITIVE
SARS-COV-2 N GENE NPH QL NAA+PROBE: NOT DETECTED
SODIUM BLD-SCNC: 140 MMOL/L (ref 136–145)
SODIUM BLD-SCNC: 140 MMOL/L (ref 136–145)
SP GR UR STRIP: 1.02 (ref 1–1.03)
SQUAMOUS #/AREA URNS HPF: ABNORMAL /HPF
T&S EXPIRATION DATE: NORMAL
TROPONIN T SERPL-MCNC: <0.01 NG/ML (ref 0–0.03)
UROBILINOGEN UR QL STRIP: ABNORMAL
WBC NRBC COR # BLD: 10.33 10*3/MM3 (ref 3.4–10.8)
WBC NRBC COR # BLD: 16.58 10*3/MM3 (ref 3.4–10.8)
WBC UR QL AUTO: ABNORMAL /HPF

## 2020-06-12 PROCEDURE — 80048 BASIC METABOLIC PNL TOTAL CA: CPT | Performed by: HOSPITALIST

## 2020-06-12 PROCEDURE — 85025 COMPLETE CBC W/AUTO DIFF WBC: CPT | Performed by: HOSPITALIST

## 2020-06-12 PROCEDURE — 25010000002 CEFTRIAXONE PER 250 MG: Performed by: HOSPITALIST

## 2020-06-12 PROCEDURE — 99221 1ST HOSP IP/OBS SF/LOW 40: CPT | Performed by: ORTHOPAEDIC SURGERY

## 2020-06-12 PROCEDURE — 81001 URINALYSIS AUTO W/SCOPE: CPT | Performed by: ORTHOPAEDIC SURGERY

## 2020-06-12 PROCEDURE — 25010000002 MORPHINE PER 10 MG: Performed by: HOSPITALIST

## 2020-06-12 PROCEDURE — 87635 SARS-COV-2 COVID-19 AMP PRB: CPT | Performed by: EMERGENCY MEDICINE

## 2020-06-12 PROCEDURE — 93010 ELECTROCARDIOGRAM REPORT: CPT | Performed by: INTERNAL MEDICINE

## 2020-06-12 PROCEDURE — 70450 CT HEAD/BRAIN W/O DYE: CPT

## 2020-06-12 RX ORDER — ATORVASTATIN CALCIUM 20 MG/1
10 TABLET, FILM COATED ORAL NIGHTLY
Status: DISCONTINUED | OUTPATIENT
Start: 2020-06-12 | End: 2020-06-18 | Stop reason: HOSPADM

## 2020-06-12 RX ORDER — MORPHINE SULFATE 2 MG/ML
1 INJECTION, SOLUTION INTRAMUSCULAR; INTRAVENOUS EVERY 4 HOURS PRN
Status: DISCONTINUED | OUTPATIENT
Start: 2020-06-12 | End: 2020-06-12

## 2020-06-12 RX ORDER — MORPHINE SULFATE 2 MG/ML
2 INJECTION, SOLUTION INTRAMUSCULAR; INTRAVENOUS
Status: DISCONTINUED | OUTPATIENT
Start: 2020-06-12 | End: 2020-06-14

## 2020-06-12 RX ORDER — QUETIAPINE FUMARATE 25 MG/1
25 TABLET, FILM COATED ORAL NIGHTLY
Status: DISCONTINUED | OUTPATIENT
Start: 2020-06-12 | End: 2020-06-18 | Stop reason: HOSPADM

## 2020-06-12 RX ORDER — DONEPEZIL HYDROCHLORIDE 10 MG/1
10 TABLET, FILM COATED ORAL NIGHTLY
Status: DISCONTINUED | OUTPATIENT
Start: 2020-06-12 | End: 2020-06-18 | Stop reason: HOSPADM

## 2020-06-12 RX ORDER — OXYCODONE HYDROCHLORIDE AND ACETAMINOPHEN 5; 325 MG/1; MG/1
1 TABLET ORAL EVERY 4 HOURS PRN
Status: DISCONTINUED | OUTPATIENT
Start: 2020-06-12 | End: 2020-06-12

## 2020-06-12 RX ORDER — HYDROMORPHONE HYDROCHLORIDE 1 MG/ML
0.5 INJECTION, SOLUTION INTRAMUSCULAR; INTRAVENOUS; SUBCUTANEOUS ONCE
Status: DISCONTINUED | OUTPATIENT
Start: 2020-06-12 | End: 2020-06-12

## 2020-06-12 RX ORDER — LEVOTHYROXINE SODIUM 0.07 MG/1
75 TABLET ORAL
Status: DISCONTINUED | OUTPATIENT
Start: 2020-06-13 | End: 2020-06-18 | Stop reason: HOSPADM

## 2020-06-12 RX ORDER — CEFTRIAXONE SODIUM 1 G/50ML
1 INJECTION, SOLUTION INTRAVENOUS EVERY 24 HOURS
Status: DISCONTINUED | OUTPATIENT
Start: 2020-06-12 | End: 2020-06-16

## 2020-06-12 RX ORDER — CARVEDILOL 3.12 MG/1
3.12 TABLET ORAL 2 TIMES DAILY WITH MEALS
Status: DISCONTINUED | OUTPATIENT
Start: 2020-06-12 | End: 2020-06-18 | Stop reason: HOSPADM

## 2020-06-12 RX ORDER — MORPHINE SULFATE 2 MG/ML
2 INJECTION, SOLUTION INTRAMUSCULAR; INTRAVENOUS
Status: DISCONTINUED | OUTPATIENT
Start: 2020-06-22 | End: 2020-06-12

## 2020-06-12 RX ORDER — ASPIRIN 81 MG/1
81 TABLET, CHEWABLE ORAL DAILY
Status: DISCONTINUED | OUTPATIENT
Start: 2020-06-12 | End: 2020-06-13

## 2020-06-12 RX ORDER — PANTOPRAZOLE SODIUM 40 MG/1
40 TABLET, DELAYED RELEASE ORAL
Status: DISCONTINUED | OUTPATIENT
Start: 2020-06-12 | End: 2020-06-18 | Stop reason: HOSPADM

## 2020-06-12 RX ORDER — MORPHINE SULFATE 2 MG/ML
2 INJECTION, SOLUTION INTRAMUSCULAR; INTRAVENOUS
Status: DISCONTINUED | OUTPATIENT
Start: 2020-06-12 | End: 2020-06-12

## 2020-06-12 RX ORDER — MORPHINE SULFATE 2 MG/ML
1 INJECTION, SOLUTION INTRAMUSCULAR; INTRAVENOUS
Status: DISCONTINUED | OUTPATIENT
Start: 2020-06-12 | End: 2020-06-12

## 2020-06-12 RX ORDER — OXYCODONE HYDROCHLORIDE AND ACETAMINOPHEN 5; 325 MG/1; MG/1
1 TABLET ORAL EVERY 4 HOURS PRN
Status: DISCONTINUED | OUTPATIENT
Start: 2020-06-12 | End: 2020-06-13

## 2020-06-12 RX ORDER — PAROXETINE HYDROCHLORIDE 20 MG/1
20 TABLET, FILM COATED ORAL DAILY
Status: DISCONTINUED | OUTPATIENT
Start: 2020-06-12 | End: 2020-06-18 | Stop reason: HOSPADM

## 2020-06-12 RX ORDER — DOXEPIN HYDROCHLORIDE 25 MG/1
25 CAPSULE ORAL NIGHTLY
Status: DISCONTINUED | OUTPATIENT
Start: 2020-06-12 | End: 2020-06-18 | Stop reason: HOSPADM

## 2020-06-12 RX ORDER — ATORVASTATIN CALCIUM 20 MG/1
20 TABLET, FILM COATED ORAL DAILY
Status: DISCONTINUED | OUTPATIENT
Start: 2020-06-12 | End: 2020-06-12

## 2020-06-12 RX ORDER — SODIUM CHLORIDE 9 MG/ML
50 INJECTION, SOLUTION INTRAVENOUS CONTINUOUS
Status: DISCONTINUED | OUTPATIENT
Start: 2020-06-12 | End: 2020-06-14

## 2020-06-12 RX ORDER — LORAZEPAM 0.5 MG/1
0.5 TABLET ORAL EVERY 6 HOURS PRN
Status: DISCONTINUED | OUTPATIENT
Start: 2020-06-12 | End: 2020-06-17

## 2020-06-12 RX ORDER — MORPHINE SULFATE 2 MG/ML
2 INJECTION, SOLUTION INTRAMUSCULAR; INTRAVENOUS EVERY 4 HOURS PRN
Status: DISCONTINUED | OUTPATIENT
Start: 2020-06-22 | End: 2020-06-12

## 2020-06-12 RX ADMIN — PAROXETINE HYDROCHLORIDE HEMIHYDRATE 20 MG: 20 TABLET, FILM COATED ORAL at 14:03

## 2020-06-12 RX ADMIN — MORPHINE SULFATE 2 MG: 2 INJECTION, SOLUTION INTRAMUSCULAR; INTRAVENOUS at 14:43

## 2020-06-12 RX ADMIN — CEFTRIAXONE SODIUM 1 G: 1 INJECTION, SOLUTION INTRAVENOUS at 14:01

## 2020-06-12 RX ADMIN — CARVEDILOL 3.12 MG: 3.12 TABLET, FILM COATED ORAL at 14:02

## 2020-06-12 RX ADMIN — ATORVASTATIN CALCIUM 10 MG: 20 TABLET, FILM COATED ORAL at 21:51

## 2020-06-12 RX ADMIN — SODIUM CHLORIDE 75 ML/HR: 9 INJECTION, SOLUTION INTRAVENOUS at 14:05

## 2020-06-12 RX ADMIN — SODIUM CHLORIDE, PRESERVATIVE FREE 10 ML: 5 INJECTION INTRAVENOUS at 14:03

## 2020-06-12 RX ADMIN — OXYCODONE AND ACETAMINOPHEN 1 TABLET: 5; 325 TABLET ORAL at 17:41

## 2020-06-12 RX ADMIN — DONEPEZIL HYDROCHLORIDE 10 MG: 10 TABLET, FILM COATED ORAL at 21:14

## 2020-06-12 RX ADMIN — OXYCODONE AND ACETAMINOPHEN 1 TABLET: 5; 325 TABLET ORAL at 21:50

## 2020-06-12 RX ADMIN — DOXEPIN HYDROCHLORIDE 25 MG: 25 CAPSULE ORAL at 21:14

## 2020-06-12 RX ADMIN — MORPHINE SULFATE 2 MG: 2 INJECTION, SOLUTION INTRAMUSCULAR; INTRAVENOUS at 12:20

## 2020-06-12 RX ADMIN — ASPIRIN 81 MG: 81 TABLET, CHEWABLE ORAL at 14:02

## 2020-06-12 RX ADMIN — QUETIAPINE FUMARATE 25 MG: 25 TABLET ORAL at 21:14

## 2020-06-12 RX ADMIN — MORPHINE SULFATE 1 MG: 2 INJECTION, SOLUTION INTRAMUSCULAR; INTRAVENOUS at 09:37

## 2020-06-12 NOTE — H&P
History and physical    Primary care physician  Dr. Torre    Chief complaint  Right hip pain  Status post fall    History of present illness  89-year-old white female with history of coronary artery disease status post CVA osteoarthritis depression hypothyroidism and gastroesophageal of disease brought to the emergency room after the fall with right hip pain.  According to family she lost her balance and fell.  Patient is on Eliquis for chronic atrial fibrillation.  No head injury and work-up in ER revealed right hip fracture admit for management.  At the time of interview she is awake and alert hurting at the right hip no other complaint.  Patient unable to give history most of the history obtained from the chart nursing staff old record and family.  Patient has no fever cough shortness of breath chest pain abdominal pain nausea vomiting diarrhea.    PAST MEDICAL HISTORY  • Aphasia     • Arthritis     • Atrial fibrillation (CMS/HCC)     • Cerebral atherosclerosis     • Depression     • Disease of thyroid gland     • Insomnia     • Stroke (CMS/HCC)        PAST SURGICAL HISTORY  Surgical History         Past Surgical History:   Procedure Laterality Date   • KNEE SURGERY       • PATELLA OPEN REDUCTION INTERNAL FIXATION Left 5/27/2019     Procedure: PATELLA OPEN REDUCTION INTERNAL FIXATION;  Surgeon: Oleg David MD;  Location: [facility];  Service: Orthopedics         FAMILY HISTORY        Family History   Problem Relation Age of Onset   • Cancer Sister     • Migraines Other        SOCIAL HISTORY  Social History   Social History            Socioeconomic History   • Marital status:        Spouse name: Not on file   • Number of children: Not on file   • Years of education: Not on file   • Highest education level: Not on file   Tobacco Use   • Smoking status: Never Smoker   • Smokeless tobacco: Never Used   • Tobacco comment: caffeine use   Substance and Sexual Activity   • Alcohol use: No   • Drug  "use: No   • Sexual activity: Never         ALLERGIES  Patient has no known allergies.  Home medications reviewed     REVIEW OF SYSTEMS  Limited due to the patient's dementia     PHYSICAL EXAM   Blood pressure 132/72, pulse 64, temperature 97.5 °F (36.4 °C), temperature source Oral, resp. rate 16, height 167.6 cm (66\"), weight 75.8 kg (167 lb), SpO2 91 %.    GENERAL: Uncomfortable, nontoxic  HENT: nares patent, scalp is atraumatic  EYES: no scleral icterus  CV: irregular rhythm, regular rate, 2+ right DP pulse  RESPIRATORY: normal effort, clear to auscultation bilaterally  ABDOMEN: soft, nontender  MUSCULOSKELETAL: Right leg is shortened and externally rotated with tenderness to the right greater trochanter, no pain outpatient of the upper extremities or left leg, no C-spine tenderness with full range of motion of the neck  NEURO: alert, moves all extremities, follows commands  SKIN: warm, dry     LAB RESULTS  Lab Results (last 24 hours)     Procedure Component Value Units Date/Time    Basic Metabolic Panel [[id number]]  (Abnormal) Collected:  06/12/20 0514    Specimen:  Blood Updated:  06/12/20 0637     Glucose 120 mg/dL      BUN 17 mg/dL      Creatinine 0.91 mg/dL      Sodium 140 mmol/L      Potassium 5.6 mmol/L      Chloride 102 mmol/L      CO2 29.9 mmol/L      Calcium 9.0 mg/dL      eGFR Non African Amer 58 mL/min/1.73      BUN/Creatinine Ratio 18.7     Anion Gap 8.1 mmol/L     Narrative:       GFR Normal >60  Chronic Kidney Disease <60  Kidney Failure <15      CBC & Differential [[id number]] Collected:  06/12/20 0514    Specimen:  Blood Updated:  06/12/20 0614    Narrative:       The following orders were created for panel order CBC & Differential.  Procedure                               Abnormality         Status                     ---------                               -----------         ------                     CBC Auto Differential[[id number]]        Abnormal            Final result                 Please " view results for these tests on the individual orders.    CBC Auto Differential [[id number]]  (Abnormal) Collected:  06/12/20 0514    Specimen:  Blood Updated:  06/12/20 0614     WBC 16.58 10*3/mm3      RBC 4.71 10*6/mm3      Hemoglobin 14.8 g/dL      Hematocrit 44.3 %      MCV 94.1 fL      MCH 31.4 pg      MCHC 33.4 g/dL      RDW 13.2 %      RDW-SD 45.4 fl      MPV 10.3 fL      Platelets 284 10*3/mm3      Neutrophil % 81.9 %      Lymphocyte % 10.7 %      Monocyte % 6.0 %      Eosinophil % 0.2 %      Basophil % 0.5 %      Immature Grans % 0.7 %      Neutrophils, Absolute 13.59 10*3/mm3      Lymphocytes, Absolute 1.77 10*3/mm3      Monocytes, Absolute 0.99 10*3/mm3      Eosinophils, Absolute 0.03 10*3/mm3      Basophils, Absolute 0.09 10*3/mm3      Immature Grans, Absolute 0.11 10*3/mm3      nRBC 0.0 /100 WBC     COVID PRE-OP / PRE-PROCEDURE SCREENING ORDER (NO ISOLATION) - Swab, Nasopharynx [[id number]] Collected:  06/12/20 0253    Specimen:  Swab from Nasopharynx Updated:  06/12/20 0506    Narrative:       The following orders were created for panel order COVID PRE-OP / PRE-PROCEDURE SCREENING ORDER (NO ISOLATION) - Swab, Nasopharynx.  Procedure                               Abnormality         Status                     ---------                               -----------         ------                     COVID-19,[facility]HOU[facility]...[[id number]]  Normal              Final result                 Please view results for these tests on the individual orders.    COVID-19,[facility], NP SWAB IN TRANSPORT MEDIA 8-12 HR TAT - Swab, Nasopharynx [[id number]]  (Normal) Collected:  06/12/20 0253    Specimen:  Swab from Nasopharynx Updated:  06/12/20 0506     COVID19 Not Detected    Troponin [[id number]]  (Normal) Collected:  06/11/20 7860    Specimen:  Blood Updated:  06/12/20 0038     Troponin T <0.010 ng/mL     Narrative:       Troponin T Reference Range:  <= 0.03 ng/mL-   Negative for AMI  >0.03 ng/mL-     Abnormal for  myocardial necrosis.  Clinicians would have to utilize clinical acumen, EKG, Troponin and serial changes to determine if it is an Acute Myocardial Infarction or myocardial injury due to an underlying chronic condition.       Results may be falsely decreased if patient taking Biotin.      Comprehensive Metabolic Panel [[id number]]  (Abnormal) Collected:  06/11/20 2345    Specimen:  Blood Updated:  06/12/20 0035     Glucose 118 mg/dL      BUN 17 mg/dL      Creatinine 0.98 mg/dL      Sodium 140 mmol/L      Potassium 4.5 mmol/L      Chloride 101 mmol/L      CO2 31.4 mmol/L      Calcium 9.1 mg/dL      Total Protein 6.8 g/dL      Albumin 4.10 g/dL      ALT (SGPT) 24 U/L      AST (SGOT) 27 U/L      Alkaline Phosphatase 101 U/L      Total Bilirubin 0.7 mg/dL      eGFR Non African Amer 53 mL/min/1.73      Globulin 2.7 gm/dL      A/G Ratio 1.5 g/dL      BUN/Creatinine Ratio 17.3     Anion Gap 7.6 mmol/L     Narrative:       GFR Normal >60  Chronic Kidney Disease <60  Kidney Failure <15      aPTT [[id number]]  (Abnormal) Collected:  06/11/20 2345    Specimen:  Blood Updated:  06/12/20 0018     PTT 37.0 seconds     Protime-INR [[id number]]  (Abnormal) Collected:  06/11/20 2345    Specimen:  Blood Updated:  06/12/20 0018     Protime 15.6 Seconds      INR 1.27    CBC & Differential [[id number]] Collected:  06/11/20 2345    Specimen:  Blood Updated:  06/12/20 0003    Narrative:       The following orders were created for panel order CBC & Differential.  Procedure                               Abnormality         Status                     ---------                               -----------         ------                     CBC Auto Differential[[id number]]        Abnormal            Final result                 Please view results for these tests on the individual orders.    CBC Auto Differential [[id number]]  (Abnormal) Collected:  06/11/20 2345    Specimen:  Blood Updated:  06/12/20 0003     WBC 10.33 10*3/mm3      RBC 4.56 10*6/mm3       Hemoglobin 14.1 g/dL      Hematocrit 43.6 %      MCV 95.6 fL      MCH 30.9 pg      MCHC 32.3 g/dL      RDW 13.5 %      RDW-SD 48.2 fl      MPV 10.5 fL      Platelets 274 10*3/mm3      Neutrophil % 68.6 %      Lymphocyte % 22.6 %      Monocyte % 5.2 %      Eosinophil % 2.1 %      Basophil % 1.0 %      Immature Grans % 0.5 %      Neutrophils, Absolute 7.09 10*3/mm3      Lymphocytes, Absolute 2.33 10*3/mm3      Monocytes, Absolute 0.54 10*3/mm3      Eosinophils, Absolute 0.22 10*3/mm3      Basophils, Absolute 0.10 10*3/mm3      Immature Grans, Absolute 0.05 10*3/mm3      nRBC 0.0 /100 WBC         Imaging Results (Last 24 Hours)     Procedure Component Value Units Date/Time    XR Chest 1 View [[id number]] Collected:  06/12/20 0032     Updated:  06/12/20 0037    Narrative:       AP CHEST X-RAY AND PELVIS AND RIGHT HIP X-RAYS     CLINICAL HISTORY: Preop chest x-ray. Right hip pain.     Chest:     The lungs are well-expanded and appear free of infiltrates. There are no  pleural effusions. The heart is normal in size.     IMPRESSIONS: No evidence of active disease within the chest.     Pelvis and right hip:     An AP view the pelvis and AP and crosstable lateral views of the right  hip demonstrate an acute subcapital mildly displaced fracture of the  right femoral neck. The right femoral head and acetabulum appear intact.  The left hip joint is unremarkable.     This report was finalized on 6/12/2020 12:34 AM by Dr. Du Friedman M.D.       XR Hip With or Without Pelvis 2 - 3 View Right [[id number]] Collected:  06/12/20 0032     Updated:  06/12/20 0037    Narrative:       AP CHEST X-RAY AND PELVIS AND RIGHT HIP X-RAYS     CLINICAL HISTORY: Preop chest x-ray. Right hip pain.     Chest:     The lungs are well-expanded and appear free of infiltrates. There are no  pleural effusions. The heart is normal in size.     IMPRESSIONS: No evidence of active disease within the chest.     Pelvis and right hip:     An AP view the  pelvis and AP and crosstable lateral views of the right  hip demonstrate an acute subcapital mildly displaced fracture of the  right femoral neck. The right femoral head and acetabulum appear intact.  The left hip joint is unremarkable.     This report was finalized on 6/12/2020 12:34 AM by Dr. Du Friedman M.D.       CT Head Without Contrast [[id number]] Collected:  06/12/20 0021     Updated:  06/12/20 0029    Narrative:       CT HEAD WO CONTRAST-     CLINICAL HISTORY: Patient fell. Patient anticoagulated.     TECHNIQUE: Transverse 3 mm thick images were performed from the base of  the skull to the vertex without IV contrast.     Radiation dose reduction techniques were utilized, including automated  exposure control and exposure modulation based on body size.     COMPARISON: None     FINDINGS: The brain and ventricular system appear structurally within  normal limits for patient of this advanced age. There is a small chronic  infarct in the periventricular white matter of the left frontal lobe  producing mild ex vacuo dilatation of the left lateral ventricle. This  appears unchanged. There is no evidence of acute infarct or hemorrhage.  There are no masses. Bone window images demonstrate no evidence of skull  fracture. The paranasal sinuses are well aerated.       Impression:       Chronic white matter infarct in the left cerebral hemisphere  showing no significant change since the preceding CT scan dated  05/21/2019. No acute intracranial abnormality is identified.     This report was finalized on 6/12/2020 12:26 AM by Dr. Du Friedman M.D.           ECG 12 Lead        HEART RATE= 67  bpm  RR Interval= 908  ms  WV Interval= 203  ms  P Horizontal Axis= 46  deg  P Front Axis= 7  deg  QRSD Interval= 93  ms  QT Interval= 419  ms  QRS Axis= -46  deg  T Wave Axis= 91  deg  - ABNORMAL ECG -  Sinus rhythm  Atrial premature complex  Left anterior fascicular block  Low voltage, precordial leads  NO SIGNIFICANT CHANGE  FROM PREVIOUS ECG             Current Facility-Administered Medications:   •  atorvastatin (LIPITOR) tablet 10 mg, 10 mg, Oral, Nightly, Emily Moreno MD  •  carvedilol (COREG) tablet 3.125 mg, 3.125 mg, Oral, BID With Meals, Emily Moreno MD  •  donepezil (ARICEPT) tablet 10 mg, 10 mg, Oral, Nightly, Emily Moreno MD  •  doxepin (SINEquan) capsule 25 mg, 25 mg, Oral, Nightly, Emily Moreno MD  •  [START ON 6/13/2020] levothyroxine (SYNTHROID, LEVOTHROID) tablet 75 mcg, 75 mcg, Oral, Q AM, Emily Moreno MD  •  LORazepam (ATIVAN) tablet 0.5 mg, 0.5 mg, Oral, Q6H PRN, Emily Moreno MD  •  morphine injection 2 mg, 2 mg, Intravenous, Q2H PRN **FOLLOWED BY** [DISCONTINUED] morphine injection 2 mg, 2 mg, Intravenous, Q2H PRN, Emily Moreno MD  •  oxyCODONE-acetaminophen (PERCOCET) 5-325 MG per tablet 1 tablet, 1 tablet, Oral, Q4H PRN, Emily Moreno MD  •  pantoprazole (PROTONIX) EC tablet 40 mg, 40 mg, Oral, Q AM, Emily Moreno MD  •  PARoxetine (PAXIL) tablet 20 mg, 20 mg, Oral, Daily, Emily Moreno MD  •  QUEtiapine (SEROquel) tablet 25 mg, 25 mg, Oral, Nightly, Emily Moreno MD  •  [COMPLETED] Insert peripheral IV, , , Once **AND** sodium chloride 0.9 % flush 10 mL, 10 mL, Intravenous, PRN, Vince Louis II, MD     ASSESSMENT  Acute right hip femoral neck fracture  Status post fall  Probable acute UTI  Chronic atrial fibrillation on Eliquis  Dementia  Depression  Hyperlipidemia  Hypertension  Hypothyroidism  Anxiety disorder  Osteoarthritis  Gastroesophageal reflux disease    PLAN  Admit  Bedrest  IV fluids  IV antibiotics  Pain management  Hold Eliquis  Orthopedic surgery consult  Continue home medications  Stress ulcer DVT prophylaxis  Supportive care  Discussed with family  DNR  Clear for surgery  Follow closely further recommendation according to hospital course    EMILY MORENO MD

## 2020-06-12 NOTE — PLAN OF CARE
Problem: Patient Care Overview  Goal: Plan of Care Review  Outcome: Ongoing (interventions implemented as appropriate)  Note:   Pt admitted to the floor from the ER at 0130. Pt had a fall at home and fractured the rt hip. Rt leg is shortened and externally rotated. Pt is alert and oriented x4, but has a history of dementia and confusion. So far no signs of confusion shown. Pt has a history of stroke. No residuals noted as far as could be tested in bed. Pt came up with some occasional audible expressive wheezing. Awaiting a call back from Dr Moreno for admission orders. Pt has the Covid screening test done when pt arrived. Pt continues on 2L o2. Pt has some old bruising to the face from previous falls. As per ER , son says pt has been having frequent falls. Pt has been taking Eliquis. Pt has a clean gown and brief on for incontinence. Oriented to the remote and call button. Pt verbalized understanding. Pt is resting at this time, will continue to monitor.

## 2020-06-12 NOTE — ED PROVIDER NOTES
EMERGENCY DEPARTMENT ENCOUNTER    Room Number:  P892/1  Date of encounter:  6/12/2020  PCP: Ray Torre MD  Historian: Patient      HPI:  Chief Complaint: Right hip pain after fall  A complete HPI/ROS/PMH/PSH/SH/FH are unobtainable due to: Dementia    Context: Christianne Ordonez is a 89 y.o. female who presents to the ED c/o right hip pain after fall.  Patient fell just prior to arrival.  Pain is constant.  It is severe.  Worse with movement.  She is on Eliquis.  She does not think she hit her head but she is not sure.  She denies pain elsewhere.      PAST MEDICAL HISTORY  Active Ambulatory Problems     Diagnosis Date Noted   • Acquired aphasia 02/08/2016   • A-fib (CMS/[facility]) 02/08/2016   • Atherosclerotic cerebrovascular disease 02/08/2016   • Apoplectic attack (CMS/[facility]) 02/08/2016   • Closed displaced transverse fracture of left patella 05/25/2019   • Dementia without behavioral disturbance (CMS/[facility]) 08/06/2019   • Mixed hyperlipidemia 08/08/2019   • Cerebrovascular accident (CVA) due to embolism of left middle cerebral artery (CMS/[facility]) 02/20/2020     Resolved Ambulatory Problems     Diagnosis Date Noted   • No Resolved Ambulatory Problems     Past Medical History:   Diagnosis Date   • Aphasia    • Arthritis    • Atrial fibrillation (CMS/[facility])    • Cerebral atherosclerosis    • Depression    • Disease of thyroid gland    • Insomnia    • Stroke (CMS/[facility])          PAST SURGICAL HISTORY  Past Surgical History:   Procedure Laterality Date   • KNEE SURGERY     • PATELLA OPEN REDUCTION INTERNAL FIXATION Left 5/27/2019    Procedure: PATELLA OPEN REDUCTION INTERNAL FIXATION;  Surgeon: Oleg David MD;  Location: [facility];  Service: Orthopedics         FAMILY HISTORY  Family History   Problem Relation Age of Onset   • Cancer Sister    • Migraines Other          SOCIAL HISTORY  Social History     Socioeconomic History   • Marital status:      Spouse name: Not on file   • Number of children: Not on file    • Years of education: Not on file   • Highest education level: Not on file   Tobacco Use   • Smoking status: Never Smoker   • Smokeless tobacco: Never Used   • Tobacco comment: caffeine use   Substance and Sexual Activity   • Alcohol use: No   • Drug use: No   • Sexual activity: Never         ALLERGIES  Patient has no known allergies.        REVIEW OF SYSTEMS  Review of Systems     Limited due to the patient's dementia      PHYSICAL EXAM    I have reviewed the triage vital signs and nursing notes.    ED Triage Vitals [06/11/20 2326]   Temp Heart Rate Resp BP SpO2   97.1 °F (36.2 °C) 64 16 164/79 95 %      Temp src Heart Rate Source Patient Position BP Location FiO2 (%)   Tympanic Monitor Lying Right arm --       Physical Exam  GENERAL: Uncomfortable, nontoxic  HENT: nares patent, scalp is atraumatic  EYES: no scleral icterus  CV: irregular rhythm, regular rate, 2+ right DP pulse  RESPIRATORY: normal effort, clear to auscultation bilaterally  ABDOMEN: soft, nontender  MUSCULOSKELETAL: Right leg is shortened and externally rotated with tenderness to the right greater trochanter, no pain outpatient of the upper extremities or left leg, no C-spine tenderness with full range of motion of the neck  NEURO: alert, moves all extremities, follows commands  SKIN: warm, dry        LAB RESULTS  Recent Results (from the past 24 hour(s))   Comprehensive Metabolic Panel    Collection Time: 06/11/20 11:45 PM   Result Value Ref Range    Glucose 118 (H) 65 - 99 mg/dL    BUN 17 8 - 23 mg/dL    Creatinine 0.98 0.57 - 1.00 mg/dL    Sodium 140 136 - 145 mmol/L    Potassium 4.5 3.5 - 5.2 mmol/L    Chloride 101 98 - 107 mmol/L    CO2 31.4 (H) 22.0 - 29.0 mmol/L    Calcium 9.1 8.2 - 9.6 mg/dL    Total Protein 6.8 6.0 - 8.5 g/dL    Albumin 4.10 3.50 - 5.20 g/dL    ALT (SGPT) 24 1 - 33 U/L    AST (SGOT) 27 1 - 32 U/L    Alkaline Phosphatase 101 39 - 117 U/L    Total Bilirubin 0.7 0.2 - 1.2 mg/dL    eGFR Non  Amer 53 (L) >60  mL/min/1.73    Globulin 2.7 gm/dL    A/G Ratio 1.5 g/dL    BUN/Creatinine Ratio 17.3 7.0 - 25.0    Anion Gap 7.6 5.0 - 15.0 mmol/L   Troponin    Collection Time: 06/11/20 11:45 PM   Result Value Ref Range    Troponin T <0.010 0.000 - 0.030 ng/mL   CBC Auto Differential    Collection Time: 06/11/20 11:45 PM   Result Value Ref Range    WBC 10.33 3.40 - 10.80 10*3/mm3    RBC 4.56 3.77 - 5.28 10*6/mm3    Hemoglobin 14.1 12.0 - 15.9 g/dL    Hematocrit 43.6 34.0 - 46.6 %    MCV 95.6 79.0 - 97.0 fL    MCH 30.9 26.6 - 33.0 pg    MCHC 32.3 31.5 - 35.7 g/dL    RDW 13.5 12.3 - 15.4 %    RDW-SD 48.2 37.0 - 54.0 fl    MPV 10.5 6.0 - 12.0 fL    Platelets 274 140 - 450 10*3/mm3    Neutrophil % 68.6 42.7 - 76.0 %    Lymphocyte % 22.6 19.6 - 45.3 %    Monocyte % 5.2 5.0 - 12.0 %    Eosinophil % 2.1 0.3 - 6.2 %    Basophil % 1.0 0.0 - 1.5 %    Immature Grans % 0.5 0.0 - 0.5 %    Neutrophils, Absolute 7.09 (H) 1.70 - 7.00 10*3/mm3    Lymphocytes, Absolute 2.33 0.70 - 3.10 10*3/mm3    Monocytes, Absolute 0.54 0.10 - 0.90 10*3/mm3    Eosinophils, Absolute 0.22 0.00 - 0.40 10*3/mm3    Basophils, Absolute 0.10 0.00 - 0.20 10*3/mm3    Immature Grans, Absolute 0.05 0.00 - 0.05 10*3/mm3    nRBC 0.0 0.0 - 0.2 /100 WBC   aPTT    Collection Time: 06/11/20 11:45 PM   Result Value Ref Range    PTT 37.0 (H) 22.7 - 35.4 seconds   Protime-INR    Collection Time: 06/11/20 11:45 PM   Result Value Ref Range    Protime 15.6 (H) 11.7 - 14.2 Seconds    INR 1.27 (H) 0.90 - 1.10   Type & Screen    Collection Time: 06/11/20 11:50 PM   Result Value Ref Range    ABO Type A     RH type Positive     Antibody Screen Negative     T&S Expiration Date 6/14/2020 11:59:59 PM    COVID-19,[facility] IN-HOUSE, NP SWAB IN TRANSPORT MEDIA 8-12 HR TAT - Swab, Nasopharynx    Collection Time: 06/12/20  2:53 AM   Result Value Ref Range    COVID19 Not Detected Not Detected - Ref. Range   Basic Metabolic Panel    Collection Time: 06/12/20  5:14 AM   Result Value Ref Range    Glucose  120 (H) 65 - 99 mg/dL    BUN 17 8 - 23 mg/dL    Creatinine 0.91 0.57 - 1.00 mg/dL    Sodium 140 136 - 145 mmol/L    Potassium 5.6 (H) 3.5 - 5.2 mmol/L    Chloride 102 98 - 107 mmol/L    CO2 29.9 (H) 22.0 - 29.0 mmol/L    Calcium 9.0 8.2 - 9.6 mg/dL    eGFR Non African Amer 58 (L) >60 mL/min/1.73    BUN/Creatinine Ratio 18.7 7.0 - 25.0    Anion Gap 8.1 5.0 - 15.0 mmol/L   CBC Auto Differential    Collection Time: 06/12/20  5:14 AM   Result Value Ref Range    WBC 16.58 (H) 3.40 - 10.80 10*3/mm3    RBC 4.71 3.77 - 5.28 10*6/mm3    Hemoglobin 14.8 12.0 - 15.9 g/dL    Hematocrit 44.3 34.0 - 46.6 %    MCV 94.1 79.0 - 97.0 fL    MCH 31.4 26.6 - 33.0 pg    MCHC 33.4 31.5 - 35.7 g/dL    RDW 13.2 12.3 - 15.4 %    RDW-SD 45.4 37.0 - 54.0 fl    MPV 10.3 6.0 - 12.0 fL    Platelets 284 140 - 450 10*3/mm3    Neutrophil % 81.9 (H) 42.7 - 76.0 %    Lymphocyte % 10.7 (L) 19.6 - 45.3 %    Monocyte % 6.0 5.0 - 12.0 %    Eosinophil % 0.2 (L) 0.3 - 6.2 %    Basophil % 0.5 0.0 - 1.5 %    Immature Grans % 0.7 (H) 0.0 - 0.5 %    Neutrophils, Absolute 13.59 (H) 1.70 - 7.00 10*3/mm3    Lymphocytes, Absolute 1.77 0.70 - 3.10 10*3/mm3    Monocytes, Absolute 0.99 (H) 0.10 - 0.90 10*3/mm3    Eosinophils, Absolute 0.03 0.00 - 0.40 10*3/mm3    Basophils, Absolute 0.09 0.00 - 0.20 10*3/mm3    Immature Grans, Absolute 0.11 (H) 0.00 - 0.05 10*3/mm3    nRBC 0.0 0.0 - 0.2 /100 WBC       Ordered the above labs and independently reviewed the results.        RADIOLOGY  Ct Head Without Contrast    Result Date: 6/12/2020  CT HEAD WO CONTRAST-  CLINICAL HISTORY: Patient fell. Patient anticoagulated.  TECHNIQUE: Transverse 3 mm thick images were performed from the base of the skull to the vertex without IV contrast.  Radiation dose reduction techniques were utilized, including automated exposure control and exposure modulation based on body size.  COMPARISON: None  FINDINGS: The brain and ventricular system appear structurally within normal limits for patient  of this advanced age. There is a small chronic infarct in the periventricular white matter of the left frontal lobe producing mild ex vacuo dilatation of the left lateral ventricle. This appears unchanged. There is no evidence of acute infarct or hemorrhage. There are no masses. Bone window images demonstrate no evidence of skull fracture. The paranasal sinuses are well aerated.      Chronic white matter infarct in the left cerebral hemisphere showing no significant change since the preceding CT scan dated 05/21/2019. No acute intracranial abnormality is identified.  This report was finalized on 6/12/2020 12:26 AM by Dr. Du Friedman M.D.      Xr Chest 1 View    Result Date: 6/12/2020  AP CHEST X-RAY AND PELVIS AND RIGHT HIP X-RAYS  CLINICAL HISTORY: Preop chest x-ray. Right hip pain.  Chest:  The lungs are well-expanded and appear free of infiltrates. There are no pleural effusions. The heart is normal in size.  IMPRESSIONS: No evidence of active disease within the chest.  Pelvis and right hip:  An AP view the pelvis and AP and crosstable lateral views of the right hip demonstrate an acute subcapital mildly displaced fracture of the right femoral neck. The right femoral head and acetabulum appear intact. The left hip joint is unremarkable.  This report was finalized on 6/12/2020 12:34 AM by Dr. Du Friedman M.D.      Xr Hip With Or Without Pelvis 2 - 3 View Right    Result Date: 6/12/2020  AP CHEST X-RAY AND PELVIS AND RIGHT HIP X-RAYS  CLINICAL HISTORY: Preop chest x-ray. Right hip pain.  Chest:  The lungs are well-expanded and appear free of infiltrates. There are no pleural effusions. The heart is normal in size.  IMPRESSIONS: No evidence of active disease within the chest.  Pelvis and right hip:  An AP view the pelvis and AP and crosstable lateral views of the right hip demonstrate an acute subcapital mildly displaced fracture of the right femoral neck. The right femoral head and acetabulum appear intact.  The left hip joint is unremarkable.  This report was finalized on 6/12/2020 12:34 AM by Dr. Du Friedman M.D.        I ordered the above noted radiological studies. Reviewed by me and discussed with radiologist.  See dictation for official radiology interpretation.      PROCEDURES    Procedures      MEDICATIONS GIVEN IN ER    Medications   sodium chloride 0.9 % flush 10 mL (10 mL Intravenous Given 6/12/20 1403)   pantoprazole (PROTONIX) EC tablet 40 mg (40 mg Oral Not Given 6/12/20 0636)   carvedilol (COREG) tablet 3.125 mg (3.125 mg Oral Given 6/12/20 1402)   donepezil (ARICEPT) tablet 10 mg (has no administration in time range)   doxepin (SINEquan) capsule 25 mg (has no administration in time range)   levothyroxine (SYNTHROID, LEVOTHROID) tablet 75 mcg (has no administration in time range)   LORazepam (ATIVAN) tablet 0.5 mg (has no administration in time range)   PARoxetine (PAXIL) tablet 20 mg (20 mg Oral Given 6/12/20 1403)   QUEtiapine (SEROquel) tablet 25 mg (has no administration in time range)   atorvastatin (LIPITOR) tablet 10 mg (has no administration in time range)   morphine injection 2 mg (2 mg Intravenous Given 6/12/20 1220)   oxyCODONE-acetaminophen (PERCOCET) 5-325 MG per tablet 1 tablet (has no administration in time range)   sodium chloride 0.9 % infusion (75 mL/hr Intravenous New Bag 6/12/20 1405)   cefTRIAXone (ROCEPHIN) IVPB 1 g (1 g Intravenous New Bag 6/12/20 1401)   aspirin chewable tablet 81 mg (81 mg Oral Given 6/12/20 1402)   HYDROmorphone (DILAUDID) injection 0.5 mg (0.5 mg Intravenous Given 6/11/20 2351)         PROGRESS, DATA ANALYSIS, CONSULTS, AND MEDICAL DECISION MAKING    All labs have been independently reviewed by me.  All radiology studies have been reviewed by me and discussed with radiologist dictating the report.   EKG's independently viewed and interpreted by me.  Discussion below represents my analysis of pertinent findings related to patient's condition, differential  diagnosis, treatment plan and final disposition.    Patient presents with what appears to be a right hip fracture.  She is on Eliquis although I see no evidence of head trauma.  Nonetheless given her fall and when to get a CT scan of her head.    ED Course as of Jun 12 1415 Fri Jun 12, 2020   0027 Discussed the CT head results with Dr. Friedman, radiology.  CT head is acutely negative.    [TD]   0028 X-ray of the right hip interpreted by myself.  This shows a femoral neck fracture.    [TD]   0029 EKG interpreted by myself.  Time 12:21 AM.  Atrial fibrillation.  Heart rate 67.  Left axis deviation.  Low voltage in the precordial leads.  Prolonged R wave progression.  Nonspecific T wave changes.    [TD]      ED Course User Index  [TD] Vince Louis II, MD       I discussed with Dr. Moreno who will admit. We reviewed labs and imaging.     PPE: Both the patient and I wore a surgical mask throughout the entire patient encounter.     AS OF 14:15 VITALS:    BP - 132/72  HR - 64  TEMP - 97.5 °F (36.4 °C) (Oral)  O2 SATS - 91%        DIAGNOSIS  Final diagnoses:   Closed fracture of right hip, initial encounter (CMS/[facility])   Fall, initial encounter         DISPOSITION  Admit           Vince Louis II, MD  06/12/20 3219

## 2020-06-12 NOTE — CONSULTS
Orthopedic Consult      Patient: Christianne Ordonez    Date of Admission: 6/11/2020 11:28 PM    YOB: 1929    Medical Record Number: [id number]    Attending Physician: Fortino Moreno MD     Consulting Physician: Marcos Jean MD    Chief Complaints: Fall, initial encounter [W19.XXXA]  Closed fracture of right hip, initial encounter (CMS/[facility]) [S72.001A]      History of Present Illness: 89 y.o. female admitted to [facility] to services of Fortino Moreno MD with Fall, initial encounter [W19.XXXA]  Closed fracture of right hip, initial encounter (CMS/[facility]) [S72.001A].  was consulted for further evaluation and treatment.  Patient has a history of stroke in 2015 that was caused by a new onset of A. fib.  Patient also has dementia.  Takes Eliquis for A. fib.  Patient is unable to provide accurate medical history.  I did speak with both the son and the daughter.  According to the son she sleeps usually most of the day and stays up most of the night watching TV.  She is able to ambulate at home without any assistive device although she does have a walker to use as needed if she feels unsteady.  Family also reports that she does have frequent falls.  She has primarily a home ambulator.  Son reports that she had not taken any Eliquis yesterday.     No Known Allergies    Medications:   Home Medications:  Medications Prior to Admission   Medication Sig Dispense Refill Last Dose   • apixaban (ELIQUIS) 2.5 MG tablet tablet Take 1 tablet by mouth 2 (Two) Times a Day. 180 tablet 1 Taking   • atorvastatin (LIPITOR) 20 MG tablet Take 20 mg by mouth Daily.  3 Taking   • carvedilol (COREG) 3.125 MG tablet Take 3.125 mg by mouth 2 (two) times a day with meals.   Taking   • donepezil (ARICEPT) 10 MG tablet Take 1 tablet by mouth Every Night. 30 tablet 5 Taking   • doxepin (SINEquan) 25 MG capsule TK 1 C PO Q NIGHT  3 Taking   • levothyroxine (SYNTHROID, LEVOTHROID) 50 MCG tablet 75 mcg Daily.   Taking   • LORazepam (ATIVAN) 0.5  MG tablet    Taking   • PARoxetine (PAXIL) 20 MG tablet TAKE ONE TABLET BY MOUTH ONCE DAILY 90 tablet 0 Taking   • QUEtiapine (SEROquel) 25 MG tablet Take 25 mg by mouth Every Night.   Taking       Current Medications:  Scheduled Meds:  pantoprazole 40 mg Oral Q AM     Continuous Infusions:   PRN Meds:.•  Morphine **FOLLOWED BY** [START ON 6/22/2020] Morphine  •  [COMPLETED] Insert peripheral IV **AND** sodium chloride       Past Medical History:   Diagnosis Date   • Aphasia    • Arthritis    • Atrial fibrillation (CMS/HCC)    • Cerebral atherosclerosis    • Depression    • Disease of thyroid gland    • Insomnia    • Stroke (CMS/HCC)         Past Surgical History:   Procedure Laterality Date   • KNEE SURGERY     • PATELLA OPEN REDUCTION INTERNAL FIXATION Left 5/27/2019    Procedure: PATELLA OPEN REDUCTION INTERNAL FIXATION;  Surgeon: Oleg David MD;  Location: [facility];  Service: Orthopedics        Social History     Occupational History   • Not on file   Tobacco Use   • Smoking status: Never Smoker   • Smokeless tobacco: Never Used   • Tobacco comment: caffeine use   Substance and Sexual Activity   • Alcohol use: No   • Drug use: No   • Sexual activity: Never    Social History     Social History Narrative   • Not on file        Family History   Problem Relation Age of Onset   • Cancer Sister    • Migraines Other          Review of Systems:   HEENT: Patient denies any headaches, vision changes, change in hearing, or tinnitus, Patient denies any rhinorrhea,epistaxis, sinus pain, mouth or dental problems, sore throat or hoarseness, or dysphagia  Pulmonary: Patient denies any cough, congestion, SOA, or wheezing  Cardiovascular: Patient denies any chest pain, dyspnea, palpitations, weakness, intolerance of exercise, varicosities, swelling of extremities, known murmur  Gastrointestinal:  Patient denies nausea, vomiting, diarrhea, constipation, loss  of appetite, change in appetite, dysphagia, gas, heartburn,  melena, change in bowel habits, use of laxatives or other drugs to alter the function of the gastrointestinal tract.  Genital/Urinary: Patient denies dysuria, change in color of urine, change in frequency of urination, pain with urgency, incontinence, retention, or nocturia.  Musculoskeletal: Patient denies increased warmth; redness; or swelling of joints; limitation of function; deformity; crepitation: pain in a joint or an extremity, the neck, or the back, especially with movement.  Neurological: Patient denies dizziness, tremor, ataxia, difficulty in speaking, change in speech, paresthesia, loss of sensation, seizures, syncope, changes in memory.  Endocrine system: Patient denies tremors, palpitations, intolerance of heat or cold, polyuria, polydipsia, polyphagia, diaphoresis, exophthalmos, or goiter.  Psychological: Patient denies thoughts/plans or harming self or other; depression,  insomnia, night terrors, rosario, memory loss, disorientation.  Skin: Patient denies any bruising, rashes, discoloration, pruritus, wounds, ulcers, decubiti, changes in the hair or nails  Hematopoietic: Patient denies history of spontaneous or excessive bleeding, epistaxis, hematuria, melena, fatigue, enlarged or tender lymph nodes, pallor, history of anemia.    Physical Exam: 89 y.o. female  Vitals:    06/12/20 0100 06/12/20 0412 06/12/20 0442 06/12/20 0746   BP: 155/78 142/89  146/81   BP Location: Right arm Right arm  Right arm   Patient Position: Lying Lying  Lying   Pulse: 68 111 99 115   Resp: 18 18  16   Temp: 97.1 °F (36.2 °C) 97.1 °F (36.2 °C)  97.5 °F (36.4 °C)   TempSrc: Skin Skin  Oral   SpO2: 96% 94%  95%   Weight:       Height:         General Appearance:    Alert, cooperative, in no acute distress                      Head:    Normocephalic, without obvious abnormality, atraumatic   Eyes:            Lids and lashes normal, conjunctivae and sclerae normal, no   icterus, no pallor, corneas clear, PERRLA   Ears:    Ears  appear intact with no abnormalities noted   Throat:   No oral lesions, no thrush, oral mucosa moist   Neck:   No adenopathy, supple, trachea midline, no thyromegaly, no   carotid bruit, no JVD   Back:     No kyphosis present, no scoliosis present, no skin lesions,      erythema or scars, no tenderness to percussion or                   palpation,   range of motion normal   Lungs:     Clear to auscultation,respirations regular, even and                  unlabored    Heart:    Regular rhythm and normal rate, normal S1 and S2, no            murmur, no gallop, no rub, no click   Chest Wall:    No abnormalities observed   Abdomen:     Normal bowel sounds, no masses, no organomegaly, soft        non-tender, non-distended, no guarding, no rebound                tenderness   Rectal:     Deferred   Extremities:   Tenderness noted at right hip.  moves all extremities well, no       edema, no cyanosis, no redness.  Right leg is externally rotated and shortened.   Pulses:   Pulses palpable and equal bilaterally   Skin:   No bleeding, bruising or rash   Lymph nodes:   No palpable adenopathy   Neurologic:   Cranial nerves 2 - 12 grossly intact, sensation intact, DTR       present and equal bilaterally   Right Hip. Skin and soft tissues are swollen and bruised consistent with fracture. There is a shortening with external rotation deformity. Patient is unable to perform a straight leg raise exam actively or passively. Any rotation or flexion of the hip bothers the patient very significantly. The anterior joint line is exquisitely tender. IT band is painful and tender. Greater trochanter is tender. Patient is unable to bear weight on this affected extremity and has a very marked limp. There is no evidence of compartmental syndrome. Dorsalis pedis and posterior tibial artery pulses are palpable. Joint line is exquisitely tender. There is no evidence of a compartmental syndrome.            Diagnostic Tests:  Admission on 06/11/2020    Component Date Value Ref Range Status   • Glucose 06/11/2020 118* 65 - 99 mg/dL Final   • BUN 06/11/2020 17  8 - 23 mg/dL Final   • Creatinine 06/11/2020 0.98  0.57 - 1.00 mg/dL Final   • Sodium 06/11/2020 140  136 - 145 mmol/L Final   • Potassium 06/11/2020 4.5  3.5 - 5.2 mmol/L Final   • Chloride 06/11/2020 101  98 - 107 mmol/L Final   • CO2 06/11/2020 31.4* 22.0 - 29.0 mmol/L Final   • Calcium 06/11/2020 9.1  8.2 - 9.6 mg/dL Final   • Total Protein 06/11/2020 6.8  6.0 - 8.5 g/dL Final   • Albumin 06/11/2020 4.10  3.50 - 5.20 g/dL Final   • ALT (SGPT) 06/11/2020 24  1 - 33 U/L Final   • AST (SGOT) 06/11/2020 27  1 - 32 U/L Final   • Alkaline Phosphatase 06/11/2020 101  39 - 117 U/L Final   • Total Bilirubin 06/11/2020 0.7  0.2 - 1.2 mg/dL Final   • eGFR Non African Amer 06/11/2020 53* >60 mL/min/1.73 Final   • Globulin 06/11/2020 2.7  gm/dL Final   • A/G Ratio 06/11/2020 1.5  g/dL Final   • BUN/Creatinine Ratio 06/11/2020 17.3  7.0 - 25.0 Final   • Anion Gap 06/11/2020 7.6  5.0 - 15.0 mmol/L Final   • Troponin T 06/11/2020 <0.010  0.000 - 0.030 ng/mL Final   • WBC 06/11/2020 10.33  3.40 - 10.80 10*3/mm3 Final   • RBC 06/11/2020 4.56  3.77 - 5.28 10*6/mm3 Final   • Hemoglobin 06/11/2020 14.1  12.0 - 15.9 g/dL Final   • Hematocrit 06/11/2020 43.6  34.0 - 46.6 % Final   • MCV 06/11/2020 95.6  79.0 - 97.0 fL Final   • MCH 06/11/2020 30.9  26.6 - 33.0 pg Final   • MCHC 06/11/2020 32.3  31.5 - 35.7 g/dL Final   • RDW 06/11/2020 13.5  12.3 - 15.4 % Final   • RDW-SD 06/11/2020 48.2  37.0 - 54.0 fl Final   • MPV 06/11/2020 10.5  6.0 - 12.0 fL Final   • Platelets 06/11/2020 274  140 - 450 10*3/mm3 Final   • Neutrophil % 06/11/2020 68.6  42.7 - 76.0 % Final   • Lymphocyte % 06/11/2020 22.6  19.6 - 45.3 % Final   • Monocyte % 06/11/2020 5.2  5.0 - 12.0 % Final   • Eosinophil % 06/11/2020 2.1  0.3 - 6.2 % Final   • Basophil % 06/11/2020 1.0  0.0 - 1.5 % Final   • Immature Grans % 06/11/2020 0.5  0.0 - 0.5 % Final   •  Neutrophils, Absolute 06/11/2020 7.09* 1.70 - 7.00 10*3/mm3 Final   • Lymphocytes, Absolute 06/11/2020 2.33  0.70 - 3.10 10*3/mm3 Final   • Monocytes, Absolute 06/11/2020 0.54  0.10 - 0.90 10*3/mm3 Final   • Eosinophils, Absolute 06/11/2020 0.22  0.00 - 0.40 10*3/mm3 Final   • Basophils, Absolute 06/11/2020 0.10  0.00 - 0.20 10*3/mm3 Final   • Immature Grans, Absolute 06/11/2020 0.05  0.00 - 0.05 10*3/mm3 Final   • nRBC 06/11/2020 0.0  0.0 - 0.2 /100 WBC Final   • PTT 06/11/2020 37.0* 22.7 - 35.4 seconds Final   • Protime 06/11/2020 15.6* 11.7 - 14.2 Seconds Final   • INR 06/11/2020 1.27* 0.90 - 1.10 Final   • ABO Type 06/11/2020 A   Final   • RH type 06/11/2020 Positive   Final   • Antibody Screen 06/11/2020 Negative   Final   • T&S Expiration Date 06/11/2020 6/14/2020 11:59:59 PM   Final   • COVID19 06/12/2020 Not Detected  Not Detected - Ref. Range Final   • Glucose 06/12/2020 120* 65 - 99 mg/dL Final   • BUN 06/12/2020 17  8 - 23 mg/dL Final   • Creatinine 06/12/2020 0.91  0.57 - 1.00 mg/dL Final   • Sodium 06/12/2020 140  136 - 145 mmol/L Final   • Potassium 06/12/2020 5.6* 3.5 - 5.2 mmol/L Final   • Chloride 06/12/2020 102  98 - 107 mmol/L Final   • CO2 06/12/2020 29.9* 22.0 - 29.0 mmol/L Final   • Calcium 06/12/2020 9.0  8.2 - 9.6 mg/dL Final   • eGFR Non African Amer 06/12/2020 58* >60 mL/min/1.73 Final   • BUN/Creatinine Ratio 06/12/2020 18.7  7.0 - 25.0 Final   • Anion Gap 06/12/2020 8.1  5.0 - 15.0 mmol/L Final   • WBC 06/12/2020 16.58* 3.40 - 10.80 10*3/mm3 Final   • RBC 06/12/2020 4.71  3.77 - 5.28 10*6/mm3 Final   • Hemoglobin 06/12/2020 14.8  12.0 - 15.9 g/dL Final   • Hematocrit 06/12/2020 44.3  34.0 - 46.6 % Final   • MCV 06/12/2020 94.1  79.0 - 97.0 fL Final   • MCH 06/12/2020 31.4  26.6 - 33.0 pg Final   • MCHC 06/12/2020 33.4  31.5 - 35.7 g/dL Final   • RDW 06/12/2020 13.2  12.3 - 15.4 % Final   • RDW-SD 06/12/2020 45.4  37.0 - 54.0 fl Final   • MPV 06/12/2020 10.3  6.0 - 12.0 fL Final   •  Platelets 06/12/2020 284  140 - 450 10*3/mm3 Final   • Neutrophil % 06/12/2020 81.9* 42.7 - 76.0 % Final   • Lymphocyte % 06/12/2020 10.7* 19.6 - 45.3 % Final   • Monocyte % 06/12/2020 6.0  5.0 - 12.0 % Final   • Eosinophil % 06/12/2020 0.2* 0.3 - 6.2 % Final   • Basophil % 06/12/2020 0.5  0.0 - 1.5 % Final   • Immature Grans % 06/12/2020 0.7* 0.0 - 0.5 % Final   • Neutrophils, Absolute 06/12/2020 13.59* 1.70 - 7.00 10*3/mm3 Final   • Lymphocytes, Absolute 06/12/2020 1.77  0.70 - 3.10 10*3/mm3 Final   • Monocytes, Absolute 06/12/2020 0.99* 0.10 - 0.90 10*3/mm3 Final   • Eosinophils, Absolute 06/12/2020 0.03  0.00 - 0.40 10*3/mm3 Final   • Basophils, Absolute 06/12/2020 0.09  0.00 - 0.20 10*3/mm3 Final   • Immature Grans, Absolute 06/12/2020 0.11* 0.00 - 0.05 10*3/mm3 Final   • nRBC 06/12/2020 0.0  0.0 - 0.2 /100 WBC Final     No results found.    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  Will continue to hold the Eliquis for now and restart postop.  The patient was seen today for preoperative discussion.    The details of the surgical procedure were explained including the location of probable incisions and a description of the likely hardware/grafts to be used. The patient understands the likely convalescence after surgery as well as the rehabilitation required.  Also, we have  thoroughly discussed with the patient the risks, benefits and alternatives to surgery.  Risks include but are not limited to the risk of infection, joint stiffness, limited range of motion, wound healing problems, scar tissue build up, myocardial infarction, stroke, blood clots (including DVT and/or pulmonary embolus along with the risk of death) neurologic and/or vascular injury, limb length discrepancy, fracture, dislocation, nonunion, malunion, continued pain and need for further surgery including hardware failure requiring revision.     Discharge Plan: Patient has been to [facility] in the past and the family is requesting that facility for rehab post discharge.    Date: 6/12/2020    OLAYINKA Mendoza MD  [employer]

## 2020-06-12 NOTE — ED NOTES
Pt to ED from home per [facility] with reports of fall.  No LOC reported.  Pt c/o pain to right hip, pt refusing to move leg per EMS.      Pt's son reports multiple falls in past couple weeks.  Pt has old facial bruising from previous falls.  Palpable pulse in affected foot per EMS.    Pt takes eliquis, but is unable to give history r/t dementia.      Pt placed in mask upon arrival to ED.  All triage performed with this RN wearing appropriate PPE.       Cassandra Garcia, RN  06/11/20 0789

## 2020-06-13 ENCOUNTER — APPOINTMENT (OUTPATIENT)
Dept: GENERAL RADIOLOGY | Facility: HOSPITAL | Age: 85
End: 2020-06-13

## 2020-06-13 ENCOUNTER — ANESTHESIA (OUTPATIENT)
Dept: PERIOP | Facility: HOSPITAL | Age: 85
End: 2020-06-13

## 2020-06-13 ENCOUNTER — ANESTHESIA EVENT (OUTPATIENT)
Dept: PERIOP | Facility: HOSPITAL | Age: 85
End: 2020-06-13

## 2020-06-13 LAB
ALBUMIN SERPL-MCNC: 3.4 G/DL (ref 3.5–5.2)
ALBUMIN/GLOB SERPL: 1.2 G/DL
ALP SERPL-CCNC: 84 U/L (ref 39–117)
ALT SERPL W P-5'-P-CCNC: 15 U/L (ref 1–33)
ANION GAP SERPL CALCULATED.3IONS-SCNC: 9.8 MMOL/L (ref 5–15)
AST SERPL-CCNC: 18 U/L (ref 1–32)
BASOPHILS # BLD AUTO: 0.14 10*3/MM3 (ref 0–0.2)
BASOPHILS NFR BLD AUTO: 1.2 % (ref 0–1.5)
BILIRUB SERPL-MCNC: 0.8 MG/DL (ref 0.2–1.2)
BUN BLD-MCNC: 16 MG/DL (ref 8–23)
BUN/CREAT SERPL: 15 (ref 7–25)
CALCIUM SPEC-SCNC: 8.5 MG/DL (ref 8.2–9.6)
CHLORIDE SERPL-SCNC: 100 MMOL/L (ref 98–107)
CHOLEST SERPL-MCNC: 98 MG/DL (ref 0–200)
CO2 SERPL-SCNC: 29.2 MMOL/L (ref 22–29)
CREAT BLD-MCNC: 1.07 MG/DL (ref 0.57–1)
DEPRECATED RDW RBC AUTO: 44.8 FL (ref 37–54)
EOSINOPHIL # BLD AUTO: 0.49 10*3/MM3 (ref 0–0.4)
EOSINOPHIL NFR BLD AUTO: 4 % (ref 0.3–6.2)
ERYTHROCYTE [DISTWIDTH] IN BLOOD BY AUTOMATED COUNT: 13.1 % (ref 12.3–15.4)
GFR SERPL CREATININE-BSD FRML MDRD: 48 ML/MIN/1.73
GLOBULIN UR ELPH-MCNC: 2.8 GM/DL
GLUCOSE BLD-MCNC: 118 MG/DL (ref 65–99)
HBA1C MFR BLD: 6.2 % (ref 4.8–5.6)
HCT VFR BLD AUTO: 39.1 % (ref 34–46.6)
HDLC SERPL-MCNC: 40 MG/DL (ref 40–60)
HGB BLD-MCNC: 13.1 G/DL (ref 12–15.9)
IMM GRANULOCYTES # BLD AUTO: 0.04 10*3/MM3 (ref 0–0.05)
IMM GRANULOCYTES NFR BLD AUTO: 0.3 % (ref 0–0.5)
LDLC SERPL CALC-MCNC: 42 MG/DL (ref 0–100)
LDLC/HDLC SERPL: 1.05 {RATIO}
LYMPHOCYTES # BLD AUTO: 2.68 10*3/MM3 (ref 0.7–3.1)
LYMPHOCYTES NFR BLD AUTO: 22 % (ref 19.6–45.3)
MCH RBC QN AUTO: 31.3 PG (ref 26.6–33)
MCHC RBC AUTO-ENTMCNC: 33.5 G/DL (ref 31.5–35.7)
MCV RBC AUTO: 93.3 FL (ref 79–97)
MONOCYTES # BLD AUTO: 1.29 10*3/MM3 (ref 0.1–0.9)
MONOCYTES NFR BLD AUTO: 10.6 % (ref 5–12)
NEUTROPHILS # BLD AUTO: 7.52 10*3/MM3 (ref 1.7–7)
NEUTROPHILS NFR BLD AUTO: 61.9 % (ref 42.7–76)
NRBC BLD AUTO-RTO: 0 /100 WBC (ref 0–0.2)
NT-PROBNP SERPL-MCNC: 1383 PG/ML (ref 5–1800)
PLATELET # BLD AUTO: 228 10*3/MM3 (ref 140–450)
PMV BLD AUTO: 9.9 FL (ref 6–12)
POTASSIUM BLD-SCNC: 4 MMOL/L (ref 3.5–5.2)
PROT SERPL-MCNC: 6.2 G/DL (ref 6–8.5)
RBC # BLD AUTO: 4.19 10*6/MM3 (ref 3.77–5.28)
SODIUM BLD-SCNC: 139 MMOL/L (ref 136–145)
T4 FREE SERPL-MCNC: 0.98 NG/DL (ref 0.93–1.7)
TRIGL SERPL-MCNC: 80 MG/DL (ref 0–150)
TSH SERPL DL<=0.05 MIU/L-ACNC: 0.69 UIU/ML (ref 0.27–4.2)
VLDLC SERPL-MCNC: 16 MG/DL (ref 5–40)
WBC NRBC COR # BLD: 12.16 10*3/MM3 (ref 3.4–10.8)

## 2020-06-13 PROCEDURE — 25010000002 NEOSTIGMINE PER 0.5 MG: Performed by: NURSE ANESTHETIST, CERTIFIED REGISTERED

## 2020-06-13 PROCEDURE — 25010000002 DEXAMETHASONE PER 1 MG: Performed by: NURSE ANESTHETIST, CERTIFIED REGISTERED

## 2020-06-13 PROCEDURE — 25010000002 CEFTRIAXONE PER 250 MG: Performed by: ORTHOPAEDIC SURGERY

## 2020-06-13 PROCEDURE — 84439 ASSAY OF FREE THYROXINE: CPT | Performed by: HOSPITALIST

## 2020-06-13 PROCEDURE — C1776 JOINT DEVICE (IMPLANTABLE): HCPCS | Performed by: ORTHOPAEDIC SURGERY

## 2020-06-13 PROCEDURE — 80053 COMPREHEN METABOLIC PANEL: CPT | Performed by: HOSPITALIST

## 2020-06-13 PROCEDURE — 25010000002 PROPOFOL 10 MG/ML EMULSION: Performed by: NURSE ANESTHETIST, CERTIFIED REGISTERED

## 2020-06-13 PROCEDURE — 25010000002 FENTANYL CITRATE (PF) 100 MCG/2ML SOLUTION: Performed by: ANESTHESIOLOGY

## 2020-06-13 PROCEDURE — 72170 X-RAY EXAM OF PELVIS: CPT

## 2020-06-13 PROCEDURE — 76000 FLUOROSCOPY <1 HR PHYS/QHP: CPT

## 2020-06-13 PROCEDURE — 73501 X-RAY EXAM HIP UNI 1 VIEW: CPT

## 2020-06-13 PROCEDURE — 25010000003 BUPIVACAINE LIPOSOME 1.3 % SUSPENSION: Performed by: ORTHOPAEDIC SURGERY

## 2020-06-13 PROCEDURE — 25010000003 CEFAZOLIN IN DEXTROSE 2-4 GM/100ML-% SOLUTION: Performed by: ORTHOPAEDIC SURGERY

## 2020-06-13 PROCEDURE — 85025 COMPLETE CBC W/AUTO DIFF WBC: CPT | Performed by: HOSPITALIST

## 2020-06-13 PROCEDURE — 25010000002 ONDANSETRON PER 1 MG: Performed by: NURSE ANESTHETIST, CERTIFIED REGISTERED

## 2020-06-13 PROCEDURE — 25010000002 ROPIVACAINE PER 1 MG: Performed by: ORTHOPAEDIC SURGERY

## 2020-06-13 PROCEDURE — C9290 INJ, BUPIVACAINE LIPOSOME: HCPCS | Performed by: ORTHOPAEDIC SURGERY

## 2020-06-13 PROCEDURE — 27236 TREAT THIGH FRACTURE: CPT | Performed by: ORTHOPAEDIC SURGERY

## 2020-06-13 PROCEDURE — 25010000002 HYDRALAZINE PER 20 MG: Performed by: NURSE ANESTHETIST, CERTIFIED REGISTERED

## 2020-06-13 PROCEDURE — 25010000002 MORPHINE PER 10 MG: Performed by: HOSPITALIST

## 2020-06-13 PROCEDURE — 0SRR0JA REPLACEMENT OF RIGHT HIP JOINT, FEMORAL SURFACE WITH SYNTHETIC SUBSTITUTE, UNCEMENTED, OPEN APPROACH: ICD-10-PCS | Performed by: ORTHOPAEDIC SURGERY

## 2020-06-13 PROCEDURE — 83036 HEMOGLOBIN GLYCOSYLATED A1C: CPT | Performed by: HOSPITALIST

## 2020-06-13 PROCEDURE — 83880 ASSAY OF NATRIURETIC PEPTIDE: CPT | Performed by: HOSPITALIST

## 2020-06-13 PROCEDURE — 84443 ASSAY THYROID STIM HORMONE: CPT | Performed by: HOSPITALIST

## 2020-06-13 PROCEDURE — C1713 ANCHOR/SCREW BN/BN,TIS/BN: HCPCS | Performed by: ORTHOPAEDIC SURGERY

## 2020-06-13 PROCEDURE — 80061 LIPID PANEL: CPT | Performed by: HOSPITALIST

## 2020-06-13 DEVICE — HD FEM/HIP VERSYS COCR 12/14TPR 28MM PLS0MM: Type: IMPLANTABLE DEVICE | Site: HIP | Status: FUNCTIONAL

## 2020-06-13 DEVICE — IMPLANTABLE DEVICE: Type: IMPLANTABLE DEVICE | Site: HIP | Status: FUNCTIONAL

## 2020-06-13 DEVICE — CAP PRT HIP BIPOL: Type: IMPLANTABLE DEVICE | Site: HIP | Status: FUNCTIONAL

## 2020-06-13 DEVICE — SEAL HEMO SURG ARISTA/AH ABS/PWDR 3GM: Type: IMPLANTABLE DEVICE | Status: FUNCTIONAL

## 2020-06-13 DEVICE — LINER VERSYS ASMBL POLY 44/45/46MM OD X28MM: Type: IMPLANTABLE DEVICE | Site: HIP | Status: FUNCTIONAL

## 2020-06-13 DEVICE — SUT NONABS MAXBRAID/PE NMBR2 HC5 38IN BLU 900334: Type: IMPLANTABLE DEVICE | Site: HIP | Status: FUNCTIONAL

## 2020-06-13 DEVICE — STEM FEM/HIP AVENIRCOMPLETE COLAR STFF HA SZ6: Type: IMPLANTABLE DEVICE | Site: HIP | Status: FUNCTIONAL

## 2020-06-13 RX ORDER — HYDROCODONE BITARTRATE AND ACETAMINOPHEN 7.5; 325 MG/1; MG/1
1 TABLET ORAL ONCE AS NEEDED
Status: DISCONTINUED | OUTPATIENT
Start: 2020-06-13 | End: 2020-06-14

## 2020-06-13 RX ORDER — SODIUM CHLORIDE, SODIUM LACTATE, POTASSIUM CHLORIDE, CALCIUM CHLORIDE 600; 310; 30; 20 MG/100ML; MG/100ML; MG/100ML; MG/100ML
9 INJECTION, SOLUTION INTRAVENOUS CONTINUOUS
Status: DISCONTINUED | OUTPATIENT
Start: 2020-06-13 | End: 2020-06-13

## 2020-06-13 RX ORDER — OXYCODONE AND ACETAMINOPHEN 7.5; 325 MG/1; MG/1
1 TABLET ORAL ONCE AS NEEDED
Status: DISCONTINUED | OUTPATIENT
Start: 2020-06-13 | End: 2020-06-13 | Stop reason: HOSPADM

## 2020-06-13 RX ORDER — PROMETHAZINE HYDROCHLORIDE 25 MG/1
25 TABLET ORAL ONCE AS NEEDED
Status: DISCONTINUED | OUTPATIENT
Start: 2020-06-13 | End: 2020-06-13 | Stop reason: HOSPADM

## 2020-06-13 RX ORDER — SODIUM CHLORIDE 0.9 % (FLUSH) 0.9 %
3 SYRINGE (ML) INJECTION EVERY 12 HOURS SCHEDULED
Status: DISCONTINUED | OUTPATIENT
Start: 2020-06-13 | End: 2020-06-13 | Stop reason: HOSPADM

## 2020-06-13 RX ORDER — ONDANSETRON 4 MG/1
4 TABLET, FILM COATED ORAL EVERY 6 HOURS PRN
Status: DISCONTINUED | OUTPATIENT
Start: 2020-06-13 | End: 2020-06-14

## 2020-06-13 RX ORDER — DIPHENHYDRAMINE HCL 25 MG
25 CAPSULE ORAL
Status: DISCONTINUED | OUTPATIENT
Start: 2020-06-13 | End: 2020-06-13 | Stop reason: HOSPADM

## 2020-06-13 RX ORDER — DIPHENHYDRAMINE HYDROCHLORIDE 50 MG/ML
12.5 INJECTION INTRAMUSCULAR; INTRAVENOUS
Status: DISCONTINUED | OUTPATIENT
Start: 2020-06-13 | End: 2020-06-13 | Stop reason: HOSPADM

## 2020-06-13 RX ORDER — ONDANSETRON 2 MG/ML
4 INJECTION INTRAMUSCULAR; INTRAVENOUS EVERY 6 HOURS PRN
Status: DISCONTINUED | OUTPATIENT
Start: 2020-06-13 | End: 2020-06-17

## 2020-06-13 RX ORDER — ACETAMINOPHEN 325 MG/1
650 TABLET ORAL ONCE AS NEEDED
Status: DISCONTINUED | OUTPATIENT
Start: 2020-06-13 | End: 2020-06-13 | Stop reason: HOSPADM

## 2020-06-13 RX ORDER — PROMETHAZINE HYDROCHLORIDE 25 MG/ML
6.25 INJECTION, SOLUTION INTRAMUSCULAR; INTRAVENOUS
Status: DISCONTINUED | OUTPATIENT
Start: 2020-06-13 | End: 2020-06-13 | Stop reason: HOSPADM

## 2020-06-13 RX ORDER — ROPIVACAINE HYDROCHLORIDE 5 MG/ML
INJECTION, SOLUTION EPIDURAL; INFILTRATION; PERINEURAL AS NEEDED
Status: DISCONTINUED | OUTPATIENT
Start: 2020-06-13 | End: 2020-06-13 | Stop reason: HOSPADM

## 2020-06-13 RX ORDER — HYDRALAZINE HYDROCHLORIDE 20 MG/ML
5 INJECTION INTRAMUSCULAR; INTRAVENOUS
Status: DISCONTINUED | OUTPATIENT
Start: 2020-06-13 | End: 2020-06-14

## 2020-06-13 RX ORDER — EPHEDRINE SULFATE 50 MG/ML
INJECTION, SOLUTION INTRAVENOUS AS NEEDED
Status: DISCONTINUED | OUTPATIENT
Start: 2020-06-13 | End: 2020-06-13 | Stop reason: SURG

## 2020-06-13 RX ORDER — PROPOFOL 10 MG/ML
VIAL (ML) INTRAVENOUS AS NEEDED
Status: DISCONTINUED | OUTPATIENT
Start: 2020-06-13 | End: 2020-06-13 | Stop reason: SURG

## 2020-06-13 RX ORDER — LIDOCAINE HYDROCHLORIDE 20 MG/ML
INJECTION, SOLUTION INFILTRATION; PERINEURAL AS NEEDED
Status: DISCONTINUED | OUTPATIENT
Start: 2020-06-13 | End: 2020-06-13 | Stop reason: SURG

## 2020-06-13 RX ORDER — HYDROCODONE BITARTRATE AND ACETAMINOPHEN 7.5; 325 MG/1; MG/1
2 TABLET ORAL EVERY 4 HOURS PRN
Status: DISCONTINUED | OUTPATIENT
Start: 2020-06-13 | End: 2020-06-15

## 2020-06-13 RX ORDER — HYDROCODONE BITARTRATE AND ACETAMINOPHEN 7.5; 325 MG/1; MG/1
1 TABLET ORAL EVERY 4 HOURS PRN
Status: DISCONTINUED | OUTPATIENT
Start: 2020-06-13 | End: 2020-06-18 | Stop reason: HOSPADM

## 2020-06-13 RX ORDER — TRANEXAMIC ACID 100 MG/ML
INJECTION, SOLUTION INTRAVENOUS AS NEEDED
Status: DISCONTINUED | OUTPATIENT
Start: 2020-06-13 | End: 2020-06-13 | Stop reason: SURG

## 2020-06-13 RX ORDER — FENTANYL CITRATE 50 UG/ML
50 INJECTION, SOLUTION INTRAMUSCULAR; INTRAVENOUS
Status: DISCONTINUED | OUTPATIENT
Start: 2020-06-13 | End: 2020-06-13 | Stop reason: HOSPADM

## 2020-06-13 RX ORDER — CEFAZOLIN SODIUM 2 G/100ML
2 INJECTION, SOLUTION INTRAVENOUS ONCE
Status: COMPLETED | OUTPATIENT
Start: 2020-06-13 | End: 2020-06-13

## 2020-06-13 RX ORDER — FAMOTIDINE 10 MG/ML
20 INJECTION, SOLUTION INTRAVENOUS ONCE
Status: COMPLETED | OUTPATIENT
Start: 2020-06-13 | End: 2020-06-13

## 2020-06-13 RX ORDER — PROMETHAZINE HYDROCHLORIDE 25 MG/1
25 SUPPOSITORY RECTAL ONCE AS NEEDED
Status: DISCONTINUED | OUTPATIENT
Start: 2020-06-13 | End: 2020-06-13 | Stop reason: HOSPADM

## 2020-06-13 RX ORDER — ROCURONIUM BROMIDE 10 MG/ML
INJECTION, SOLUTION INTRAVENOUS AS NEEDED
Status: DISCONTINUED | OUTPATIENT
Start: 2020-06-13 | End: 2020-06-13 | Stop reason: SURG

## 2020-06-13 RX ORDER — CEFAZOLIN SODIUM 2 G/100ML
2 INJECTION, SOLUTION INTRAVENOUS EVERY 8 HOURS
Status: DISCONTINUED | OUTPATIENT
Start: 2020-06-13 | End: 2020-06-13 | Stop reason: SDUPTHER

## 2020-06-13 RX ORDER — FENTANYL CITRATE 50 UG/ML
25 INJECTION, SOLUTION INTRAMUSCULAR; INTRAVENOUS
Status: DISCONTINUED | OUTPATIENT
Start: 2020-06-13 | End: 2020-06-13 | Stop reason: HOSPADM

## 2020-06-13 RX ORDER — HYDROMORPHONE HYDROCHLORIDE 1 MG/ML
0.25 INJECTION, SOLUTION INTRAMUSCULAR; INTRAVENOUS; SUBCUTANEOUS
Status: DISCONTINUED | OUTPATIENT
Start: 2020-06-13 | End: 2020-06-14

## 2020-06-13 RX ORDER — PROMETHAZINE HYDROCHLORIDE 25 MG/ML
12.5 INJECTION, SOLUTION INTRAMUSCULAR; INTRAVENOUS ONCE AS NEEDED
Status: DISCONTINUED | OUTPATIENT
Start: 2020-06-13 | End: 2020-06-13 | Stop reason: HOSPADM

## 2020-06-13 RX ORDER — ONDANSETRON 2 MG/ML
4 INJECTION INTRAMUSCULAR; INTRAVENOUS ONCE AS NEEDED
Status: DISCONTINUED | OUTPATIENT
Start: 2020-06-13 | End: 2020-06-13 | Stop reason: HOSPADM

## 2020-06-13 RX ORDER — MAGNESIUM HYDROXIDE 1200 MG/15ML
LIQUID ORAL AS NEEDED
Status: DISCONTINUED | OUTPATIENT
Start: 2020-06-13 | End: 2020-06-13 | Stop reason: HOSPADM

## 2020-06-13 RX ORDER — GLYCOPYRROLATE 0.2 MG/ML
INJECTION INTRAMUSCULAR; INTRAVENOUS AS NEEDED
Status: DISCONTINUED | OUTPATIENT
Start: 2020-06-13 | End: 2020-06-13 | Stop reason: SURG

## 2020-06-13 RX ORDER — ONDANSETRON 2 MG/ML
INJECTION INTRAMUSCULAR; INTRAVENOUS AS NEEDED
Status: DISCONTINUED | OUTPATIENT
Start: 2020-06-13 | End: 2020-06-13 | Stop reason: SURG

## 2020-06-13 RX ORDER — ACETAMINOPHEN 650 MG
TABLET, EXTENDED RELEASE ORAL AS NEEDED
Status: DISCONTINUED | OUTPATIENT
Start: 2020-06-13 | End: 2020-06-13 | Stop reason: HOSPADM

## 2020-06-13 RX ORDER — HYDRALAZINE HYDROCHLORIDE 20 MG/ML
INJECTION INTRAMUSCULAR; INTRAVENOUS AS NEEDED
Status: DISCONTINUED | OUTPATIENT
Start: 2020-06-13 | End: 2020-06-13 | Stop reason: SURG

## 2020-06-13 RX ORDER — ACETAMINOPHEN 325 MG/1
325 TABLET ORAL EVERY 4 HOURS PRN
Status: DISCONTINUED | OUTPATIENT
Start: 2020-06-13 | End: 2020-06-17

## 2020-06-13 RX ORDER — UREA 10 %
1 LOTION (ML) TOPICAL NIGHTLY PRN
Status: DISCONTINUED | OUTPATIENT
Start: 2020-06-13 | End: 2020-06-17

## 2020-06-13 RX ORDER — LIDOCAINE HYDROCHLORIDE 10 MG/ML
0.5 INJECTION, SOLUTION EPIDURAL; INFILTRATION; INTRACAUDAL; PERINEURAL ONCE AS NEEDED
Status: DISCONTINUED | OUTPATIENT
Start: 2020-06-13 | End: 2020-06-13 | Stop reason: HOSPADM

## 2020-06-13 RX ORDER — SODIUM CHLORIDE 0.9 % (FLUSH) 0.9 %
3-10 SYRINGE (ML) INJECTION AS NEEDED
Status: DISCONTINUED | OUTPATIENT
Start: 2020-06-13 | End: 2020-06-13 | Stop reason: HOSPADM

## 2020-06-13 RX ORDER — DEXAMETHASONE SODIUM PHOSPHATE 10 MG/ML
INJECTION INTRAMUSCULAR; INTRAVENOUS AS NEEDED
Status: DISCONTINUED | OUTPATIENT
Start: 2020-06-13 | End: 2020-06-13 | Stop reason: SURG

## 2020-06-13 RX ORDER — FLUMAZENIL 0.1 MG/ML
0.2 INJECTION INTRAVENOUS AS NEEDED
Status: DISCONTINUED | OUTPATIENT
Start: 2020-06-13 | End: 2020-06-13 | Stop reason: HOSPADM

## 2020-06-13 RX ORDER — EPHEDRINE SULFATE 50 MG/ML
5 INJECTION, SOLUTION INTRAVENOUS ONCE AS NEEDED
Status: DISCONTINUED | OUTPATIENT
Start: 2020-06-13 | End: 2020-06-13 | Stop reason: HOSPADM

## 2020-06-13 RX ORDER — DOCUSATE SODIUM 100 MG/1
100 CAPSULE, LIQUID FILLED ORAL 2 TIMES DAILY
Status: DISCONTINUED | OUTPATIENT
Start: 2020-06-13 | End: 2020-06-18 | Stop reason: HOSPADM

## 2020-06-13 RX ORDER — NALOXONE HCL 0.4 MG/ML
0.2 VIAL (ML) INJECTION AS NEEDED
Status: DISCONTINUED | OUTPATIENT
Start: 2020-06-13 | End: 2020-06-13 | Stop reason: HOSPADM

## 2020-06-13 RX ADMIN — FENTANYL CITRATE 50 MCG: 50 INJECTION INTRAMUSCULAR; INTRAVENOUS at 08:07

## 2020-06-13 RX ADMIN — TRANEXAMIC ACID 800 MG: 100 INJECTION, SOLUTION INTRAVENOUS at 08:45

## 2020-06-13 RX ADMIN — MUPIROCIN 1 APPLICATION: 20 OINTMENT TOPICAL at 17:54

## 2020-06-13 RX ADMIN — SODIUM CHLORIDE 75 ML/HR: 9 INJECTION, SOLUTION INTRAVENOUS at 03:44

## 2020-06-13 RX ADMIN — POLYETHYLENE GLYCOL 3350 17 G: 17 POWDER, FOR SOLUTION ORAL at 17:54

## 2020-06-13 RX ADMIN — SODIUM CHLORIDE, POTASSIUM CHLORIDE, SODIUM LACTATE AND CALCIUM CHLORIDE: 600; 310; 30; 20 INJECTION, SOLUTION INTRAVENOUS at 09:38

## 2020-06-13 RX ADMIN — EPHEDRINE SULFATE 10 MG: 50 INJECTION INTRAVENOUS at 09:38

## 2020-06-13 RX ADMIN — QUETIAPINE FUMARATE 25 MG: 25 TABLET ORAL at 21:50

## 2020-06-13 RX ADMIN — ATORVASTATIN CALCIUM 10 MG: 20 TABLET, FILM COATED ORAL at 22:00

## 2020-06-13 RX ADMIN — MUPIROCIN 1 APPLICATION: 20 OINTMENT TOPICAL at 22:00

## 2020-06-13 RX ADMIN — CARVEDILOL 3.12 MG: 3.12 TABLET, FILM COATED ORAL at 23:47

## 2020-06-13 RX ADMIN — CARVEDILOL 3.12 MG: 3.12 TABLET, FILM COATED ORAL at 06:21

## 2020-06-13 RX ADMIN — DONEPEZIL HYDROCHLORIDE 10 MG: 10 TABLET, FILM COATED ORAL at 21:50

## 2020-06-13 RX ADMIN — SODIUM CHLORIDE, POTASSIUM CHLORIDE, SODIUM LACTATE AND CALCIUM CHLORIDE 9 ML/HR: 600; 310; 30; 20 INJECTION, SOLUTION INTRAVENOUS at 07:15

## 2020-06-13 RX ADMIN — PROPOFOL 100 MG: 10 INJECTION, EMULSION INTRAVENOUS at 08:08

## 2020-06-13 RX ADMIN — EPHEDRINE SULFATE 10 MG: 50 INJECTION INTRAVENOUS at 08:21

## 2020-06-13 RX ADMIN — MORPHINE SULFATE 2 MG: 2 INJECTION, SOLUTION INTRAMUSCULAR; INTRAVENOUS at 03:40

## 2020-06-13 RX ADMIN — ROCURONIUM BROMIDE 40 MG: 10 INJECTION, SOLUTION INTRAVENOUS at 08:08

## 2020-06-13 RX ADMIN — DOCUSATE SODIUM 100 MG: 100 CAPSULE, LIQUID FILLED ORAL at 17:54

## 2020-06-13 RX ADMIN — DOCUSATE SODIUM 100 MG: 100 CAPSULE, LIQUID FILLED ORAL at 22:00

## 2020-06-13 RX ADMIN — CEFAZOLIN SODIUM 2 G: 2 INJECTION, SOLUTION INTRAVENOUS at 08:17

## 2020-06-13 RX ADMIN — CEFTRIAXONE SODIUM 1 G: 1 INJECTION, SOLUTION INTRAVENOUS at 17:55

## 2020-06-13 RX ADMIN — EPHEDRINE SULFATE 10 MG: 50 INJECTION INTRAVENOUS at 08:15

## 2020-06-13 RX ADMIN — LIDOCAINE HYDROCHLORIDE 60 MG: 20 INJECTION, SOLUTION INFILTRATION; PERINEURAL at 08:08

## 2020-06-13 RX ADMIN — GLYCOPYRROLATE 0.4 MG: 0.2 INJECTION INTRAMUSCULAR; INTRAVENOUS at 09:50

## 2020-06-13 RX ADMIN — FENTANYL CITRATE 50 MCG: 50 INJECTION INTRAMUSCULAR; INTRAVENOUS at 08:27

## 2020-06-13 RX ADMIN — HYDRALAZINE HYDROCHLORIDE 5 MG: 20 INJECTION INTRAMUSCULAR; INTRAVENOUS at 09:07

## 2020-06-13 RX ADMIN — ONDANSETRON HYDROCHLORIDE 4 MG: 2 SOLUTION INTRAMUSCULAR; INTRAVENOUS at 08:42

## 2020-06-13 RX ADMIN — FAMOTIDINE 20 MG: 10 INJECTION, SOLUTION INTRAVENOUS at 07:15

## 2020-06-13 RX ADMIN — DOXEPIN HYDROCHLORIDE 25 MG: 25 CAPSULE ORAL at 21:59

## 2020-06-13 RX ADMIN — NEOSTIGMINE METHYLSULFATE 3 MG: 1 INJECTION INTRAMUSCULAR; INTRAVENOUS; SUBCUTANEOUS at 09:50

## 2020-06-13 RX ADMIN — DEXAMETHASONE SODIUM PHOSPHATE 6 MG: 10 INJECTION INTRAMUSCULAR; INTRAVENOUS at 08:42

## 2020-06-13 NOTE — PLAN OF CARE
Problem: Patient Care Overview  Goal: Plan of Care Review  Outcome: Ongoing (interventions implemented as appropriate)  Flowsheets (Taken 6/13/2020 0038)  Progress: no change  Plan of Care Reviewed With: patient  Outcome Summary: VSS. IV/ PO PAIN MEDICATION HELPING WITH PAIN. BEDREST. NPO . PATRIENT IS ONFUSED. HX OF DEMENTIA. PATIENT IS INCONTI.  EDUCATION PROVIDED ON PAIN CONTROL AND BLOOD PRESSURE MONITORING.

## 2020-06-13 NOTE — PROGRESS NOTES
"Daily progress note    Chief complaint  Doing same  No new complaints   Status post left hip arthroplasty    History of present illness  89-year-old white female with history of coronary artery disease status post CVA osteoarthritis depression hypothyroidism and gastroesophageal of disease brought to the emergency room after the fall with right hip pain.  According to family she lost her balance and fell.  Patient is on Eliquis for chronic atrial fibrillation.  No head injury and work-up in ER revealed right hip fracture admit for management.  At the time of interview she is awake and alert hurting at the right hip no other complaint.  Patient unable to give history most of the history obtained from the chart nursing staff old record and family.  Patient has no fever cough shortness of breath chest pain abdominal pain nausea vomiting diarrhea.     REVIEW OF SYSTEMS  Limited due to the patient's dementia     PHYSICAL EXAM   Blood pressure 125/65, pulse 95, temperature 98.5 °F (36.9 °C), resp. rate 12, height 167.6 cm (66\"), weight 75.8 kg (167 lb), SpO2 90 %.    GENERAL: Uncomfortable, nontoxic  HENT: nares patent, scalp is atraumatic  EYES: no scleral icterus  CV: irregular rhythm, regular rate, 2+ right DP pulse  RESPIRATORY: normal effort, clear to auscultation bilaterally  ABDOMEN: soft, nontender  MUSCULOSKELETAL: Right leg is shortened and externally rotated with tenderness to the right greater trochanter, no pain outpatient of the upper extremities or left leg, no C-spine tenderness with full range of motion of the neck  NEURO: alert, moves all extremities, follows commands  SKIN: warm, dry     LAB RESULTS  Lab Results (last 24 hours)     Procedure Component Value Units Date/Time    TSH [[id number]]  (Normal) Collected:  06/13/20 0405    Specimen:  Blood Updated:  06/13/20 0541     TSH 0.694 uIU/mL     BNP [[id number]]  (Normal) Collected:  06/13/20 0405    Specimen:  Blood Updated:  06/13/20 0505     proBNP " 1,383.0 pg/mL     Narrative:       Among patients with dyspnea, NT-proBNP is highly sensitive for the detection of acute congestive heart failure. In addition NT-proBNP of <300 pg/ml effectively rules out acute congestive heart failure with 99% negative predictive value.    Results may be falsely decreased if patient taking Biotin.      Comprehensive Metabolic Panel [[id number]]  (Abnormal) Collected:  06/13/20 0405    Specimen:  Blood Updated:  06/13/20 0505     Glucose 118 mg/dL      BUN 16 mg/dL      Creatinine 1.07 mg/dL      Sodium 139 mmol/L      Potassium 4.0 mmol/L      Chloride 100 mmol/L      CO2 29.2 mmol/L      Calcium 8.5 mg/dL      Total Protein 6.2 g/dL      Albumin 3.40 g/dL      ALT (SGPT) 15 U/L      AST (SGOT) 18 U/L      Alkaline Phosphatase 84 U/L      Total Bilirubin 0.8 mg/dL      eGFR Non African Amer 48 mL/min/1.73      Globulin 2.8 gm/dL      A/G Ratio 1.2 g/dL      BUN/Creatinine Ratio 15.0     Anion Gap 9.8 mmol/L     Narrative:       GFR Normal >60  Chronic Kidney Disease <60  Kidney Failure <15      Lipid Panel [[id number]] Collected:  06/13/20 0405    Specimen:  Blood Updated:  06/13/20 0503     Total Cholesterol 98 mg/dL      Triglycerides 80 mg/dL      HDL Cholesterol 40 mg/dL      LDL Cholesterol  42 mg/dL      VLDL Cholesterol 16 mg/dL      LDL/HDL Ratio 1.05    Narrative:       Cholesterol Reference Ranges  (U.S. Department of Health and Human Services ATP III Classifications)    Desirable          <200 mg/dL  Borderline High    200-239 mg/dL  High Risk          >240 mg/dL      Triglyceride Reference Ranges  (U.S. Department of Health and Human Services ATP III Classifications)    Normal           <150 mg/dL  Borderline High  150-199 mg/dL  High             200-499 mg/dL  Very High        >500 mg/dL    HDL Reference Ranges  (U.S. Department of Health and Human Services ATP III Classifcations)    Low     <40 mg/dl (major risk factor for CHD)  High    >60 mg/dl ('negative' risk factor  for CHD)        LDL Reference Ranges  (U.S. Department of Health and Human Services ATP III Classifcations)    Optimal          <100 mg/dL  Near Optimal     100-129 mg/dL  Borderline High  130-159 mg/dL  High             160-189 mg/dL  Very High        >189 mg/dL    Hemoglobin A1c [[id number]]  (Abnormal) Collected:  06/13/20 0405    Specimen:  Blood Updated:  06/13/20 0445     Hemoglobin A1C 6.20 %     Narrative:       Hemoglobin A1C Ranges:    Increased Risk for Diabetes  5.7% to 6.4%  Diabetes                     >= 6.5%  Diabetic Goal                < 7.0%    CBC & Differential [[id number]] Collected:  06/13/20 0405    Specimen:  Blood Updated:  06/13/20 0433    Narrative:       The following orders were created for panel order CBC & Differential.  Procedure                               Abnormality         Status                     ---------                               -----------         ------                     CBC Auto Differential[[id number]]        Abnormal            Final result                 Please view results for these tests on the individual orders.    CBC Auto Differential [[id number]]  (Abnormal) Collected:  06/13/20 0405    Specimen:  Blood Updated:  06/13/20 0433     WBC 12.16 10*3/mm3      RBC 4.19 10*6/mm3      Hemoglobin 13.1 g/dL      Hematocrit 39.1 %      MCV 93.3 fL      MCH 31.3 pg      MCHC 33.5 g/dL      RDW 13.1 %      RDW-SD 44.8 fl      MPV 9.9 fL      Platelets 228 10*3/mm3      Neutrophil % 61.9 %      Lymphocyte % 22.0 %      Monocyte % 10.6 %      Eosinophil % 4.0 %      Basophil % 1.2 %      Immature Grans % 0.3 %      Neutrophils, Absolute 7.52 10*3/mm3      Lymphocytes, Absolute 2.68 10*3/mm3      Monocytes, Absolute 1.29 10*3/mm3      Eosinophils, Absolute 0.49 10*3/mm3      Basophils, Absolute 0.14 10*3/mm3      Immature Grans, Absolute 0.04 10*3/mm3      nRBC 0.0 /100 WBC     Urinalysis, Microscopic Only - Urine, Catheter In/Out [[id number]]  (Abnormal) Collected:   06/12/20 1353    Specimen:  Urine, Catheter In/Out Updated:  06/12/20 1431     RBC, UA 0-2 /HPF      WBC, UA Too Numerous to Count /HPF      Bacteria, UA 4+ /HPF      Squamous Epithelial Cells, UA 0-2 /HPF      Hyaline Casts, UA 7-12 /LPF      Methodology Automated Microscopy    Urinalysis With Microscopic If Indicated (No Culture) - Urine, Catheter In/Out [[id number]]  (Abnormal) Collected:  06/12/20 1353    Specimen:  Urine, Catheter In/Out Updated:  06/12/20 1430     Color, UA Yellow     Appearance, UA Cloudy     pH, UA <=5.0     Specific Gravity, UA 1.020     Glucose, UA Negative     Ketones, UA Trace     Bilirubin, UA Negative     Blood, UA Large (3+)     Protein, UA Negative     Leuk Esterase, UA Moderate (2+)     Nitrite, UA Positive     Urobilinogen, UA 0.2 E.U./dL        Imaging Results (Last 24 Hours)     Procedure Component Value Units Date/Time    XR Pelvis 1 or 2 View [[id number]] Collected:  06/13/20 1035     Updated:  06/13/20 1058    Narrative:       ONE VIEW PORTABLE AP HIP AND AP PELVIS     HISTORY: Right hip replacement for fracture.     FINDINGS: The patient has had recent total hip replacement and the  alignment appears satisfactory.     This report was finalized on 6/13/2020 10:55 AM by Dr. Seb Chacon M.D.       XR Hip With or Without Pelvis 1 View Right [[id number]] Collected:  06/13/20 0957     Updated:  06/13/20 1007    Narrative:       ONE VIEW PORTABLE RIGHT HIP     HISTORY: Hip replacement for fracture.     FINDINGS: Imaging in the operating room was performed at the time of  right hip replacement and the alignment appears satisfactory. One image  was obtained and the fluoroscopy time measures 14 seconds.     This report was finalized on 6/13/2020 10:04 AM by Dr. Seb Chacon M.D.       FL C Arm During Surgery [[id number]] Resulted:  06/13/20 0946     Updated:  06/13/20 0946    Narrative:       This procedure was auto-finalized with no dictation required.        ECG 12 Lead             HEART RATE= 67  bpm  RR Interval= 908  ms  ND Interval= 203  ms  P Horizontal Axis= 46  deg  P Front Axis= 7  deg  QRSD Interval= 93  ms  QT Interval= 419  ms  QRS Axis= -46  deg  T Wave Axis= 91  deg  - ABNORMAL ECG -  Sinus rhythm  Atrial premature complex  Left anterior fascicular block  Low voltage, precordial leads  NO SIGNIFICANT CHANGE FROM PREVIOUS ECG             Current Facility-Administered Medications:   •  acetaminophen (TYLENOL) tablet 650 mg, 650 mg, Oral, Once PRN **OR** acetaminophen (TYLENOL) suppository 650 mg, 650 mg, Rectal, Once PRN, Lissette Aaron CRNA  •  [MAR Hold] aspirin chewable tablet 81 mg, 81 mg, Oral, Daily, Fortino Moreno MD, 81 mg at 06/12/20 1402  •  [MAR Hold] atorvastatin (LIPITOR) tablet 10 mg, 10 mg, Oral, Nightly, Fortino Moreno MD, 10 mg at 06/12/20 2151  •  carvedilol (COREG) tablet 3.125 mg, 3.125 mg, Oral, BID With Meals, Fortino Moreno MD, 3.125 mg at 06/13/20 0621  •  [MAR Hold] cefTRIAXone (ROCEPHIN) IVPB 1 g, 1 g, Intravenous, Q24H, Fortino Moreno MD, Last Rate: 100 mL/hr at 06/12/20 1401, 1 g at 06/12/20 1401  •  diphenhydrAMINE (BENADRYL) capsule 25 mg, 25 mg, Oral, Q30 Min PRN, Lissette Aaron CRNA  •  diphenhydrAMINE (BENADRYL) injection 12.5 mg, 12.5 mg, Intravenous, Q15 Min PRN, Lissette Aaron CRNA  •  [MAR Hold] donepezil (ARICEPT) tablet 10 mg, 10 mg, Oral, Nightly, Fortino Moreno MD, 10 mg at 06/12/20 2114  •  [MAR Hold] doxepin (SINEquan) capsule 25 mg, 25 mg, Oral, Nightly, Fortino Moreno MD, 25 mg at 06/12/20 2114  •  ePHEDrine injection 5 mg, 5 mg, Intravenous, Once PRN, Lissette Aaron CRNA  •  fentaNYL citrate (PF) (SUBLIMAZE) injection 25 mcg, 25 mcg, Intravenous, Q5 Min PRN, Lissette Aaron CRNA  •  fentaNYL citrate (PF) (SUBLIMAZE) injection 50 mcg, 50 mcg, Intravenous, Q10 Min PRN, Jan Pollack MD, 50 mcg at 06/13/20 0827  •  flumazenil (ROMAZICON) injection 0.2 mg, 0.2 mg, Intravenous, PRN, Lissette Aaron CRNA  •  hydrALAZINE (APRESOLINE)  injection 5 mg, 5 mg, Intravenous, Q10 Min PRN, Lissette Aaron CRNA  •  HYDROcodone-acetaminophen (NORCO) 7.5-325 MG per tablet 1 tablet, 1 tablet, Oral, Once PRN, Lissette Aaron CRNA  •  HYDROmorphone (DILAUDID) injection 0.25 mg, 0.25 mg, Intravenous, Q5 Min PRN, Lissette Aaron CRNA  •  lactated ringers infusion, 9 mL/hr, Intravenous, Continuous, Jan Pollack MD, Last Rate: 9 mL/hr at 06/13/20 1023, 9 mL/hr at 06/13/20 1023  •  [MAR Hold] levothyroxine (SYNTHROID, LEVOTHROID) tablet 75 mcg, 75 mcg, Oral, Q AM, Fortino Moreno MD  •  lidocaine PF 1% (XYLOCAINE) injection 0.5 mL, 0.5 mL, Injection, Once PRN, Jan Pollack MD  •  [MAR Hold] LORazepam (ATIVAN) tablet 0.5 mg, 0.5 mg, Oral, Q6H PRN, Fortino Moreno MD  •  [MAR Hold] morphine injection 2 mg, 2 mg, Intravenous, Q2H PRN, 2 mg at 06/13/20 0340 **FOLLOWED BY** [DISCONTINUED] morphine injection 2 mg, 2 mg, Intravenous, Q2H PRN, Fortino Moreno MD  •  naloxone (NARCAN) injection 0.2 mg, 0.2 mg, Intravenous, PRN, Lissette Aaron CRNA  •  ondansetron (ZOFRAN) injection 4 mg, 4 mg, Intravenous, Once PRN, Lissette Aaron CRNA  •  [MAR Hold] oxyCODONE-acetaminophen (PERCOCET) 5-325 MG per tablet 1 tablet, 1 tablet, Oral, Q4H PRN, Fortino Moreno MD, 1 tablet at 06/12/20 2150  •  oxyCODONE-acetaminophen (PERCOCET) 7.5-325 MG per tablet 1 tablet, 1 tablet, Oral, Once PRN, Lissette Aaron CRNA  •  [MAR Hold] pantoprazole (PROTONIX) EC tablet 40 mg, 40 mg, Oral, Q AM, Fortino Moreno MD  •  [MAR Hold] PARoxetine (PAXIL) tablet 20 mg, 20 mg, Oral, Daily, Fortino Moreno MD, 20 mg at 06/12/20 1403  •  promethazine (PHENERGAN) injection 6.25 mg, 6.25 mg, Intravenous, Q10 Min PRN **OR** promethazine (PHENERGAN) injection 12.5 mg, 12.5 mg, Intramuscular, Once PRN **OR** promethazine (PHENERGAN) suppository 25 mg, 25 mg, Rectal, Once PRN **OR** promethazine (PHENERGAN) tablet 25 mg, 25 mg, Oral, Once PRN, Lissette Aaron CRNA  •  [MAR Hold] QUEtiapine (SEROquel) tablet 25 mg,  25 mg, Oral, Nightly, Emily Moreno MD, 25 mg at 06/12/20 2114  •  [COMPLETED] Insert peripheral IV, , , Once **AND** [MAR Hold] sodium chloride 0.9 % flush 10 mL, 10 mL, Intravenous, PRN, Vince Louis II, MD, 10 mL at 06/12/20 1403  •  sodium chloride 0.9 % flush 3 mL, 3 mL, Intravenous, Q12H, Jan Pollack MD  •  sodium chloride 0.9 % flush 3-10 mL, 3-10 mL, Intravenous, PRN, Jan Pollack MD  •  sodium chloride 0.9 % infusion, 75 mL/hr, Intravenous, Continuous, Emily Moreno MD, Last Rate: 75 mL/hr at 06/13/20 0344, 75 mL/hr at 06/13/20 0344     ASSESSMENT  Acute right hip femoral neck fracture  Status post fall  Probable acute UTI  Chronic atrial fibrillation on Eliquis  Dementia  Depression  Hyperlipidemia  Hypertension  Hypothyroidism  Anxiety disorder  Osteoarthritis  Gastroesophageal reflux disease    PLAN  CPM  Postop care  IV fluids  IV antibiotics  Pain management  Resume Eliquis  Orthopedic surgery consult appreciated  Continue home medications  Stress ulcer DVT prophylaxis  Supportive care  Discussed with family  DNR  PT/OT  Follow closely further recommendation according to hospital course    EMILY MORENO MD

## 2020-06-13 NOTE — ANESTHESIA PREPROCEDURE EVALUATION
Anesthesia Evaluation     Patient summary reviewed   no history of anesthetic complications:  NPO Solid Status: > 8 hours  NPO Liquid Status: > 2 hours           Airway   Mallampati: II  TM distance: >3 FB  Neck ROM: full  Dental    (+) edentulous    Pulmonary     breath sounds clear to auscultation  (-) shortness of breath  Cardiovascular     ECG reviewed  Rate: normal    (+) dysrhythmias Atrial Fib, PVD,   (-) angina, LEOS    ROS comment: EKG: Sinus tachycardia  Nonspecific T abnormalities, lateral leads  af resolved    Neuro/Psych  (+) CVA residual symptoms, psychiatric history Depression, dementia, poor historian.,     GI/Hepatic/Renal/Endo      Musculoskeletal     Abdominal    Substance History      OB/GYN          Other   arthritis,                        Anesthesia Plan    ASA 3     general     intravenous induction     Anesthetic plan, all risks, benefits, and alternatives have been provided, discussed and informed consent has been obtained with: patient and child.    Plan discussed with CRNA.

## 2020-06-13 NOTE — NURSING NOTE
Attempted to contact family to clarify patient's history and present state of illness. No one answered. Chart reviewed and updated as possible for pre-op assessment.

## 2020-06-13 NOTE — ANESTHESIA POSTPROCEDURE EVALUATION
Patient: Christianne Ordonez    Procedure Summary     Date:  06/13/20 Room / Location:  [facility] OR [address] [place] [address] MAIN OR    Anesthesia Start:  0804 Anesthesia Stop:  1006    Procedure:  Anterior HIP BIPOLAR CEMENTED (Right Hip) Diagnosis:      Surgeon:  Marcos Jean MD Provider:  Jan Pollack MD    Anesthesia Type:  general ASA Status:  3          Anesthesia Type: general    Vitals  Vitals Value Taken Time   /64 6/13/2020 10:40 AM   Temp 37.6 °C (99.6 °F) 6/13/2020 10:04 AM   Pulse 80 6/13/2020 10:53 AM   Resp 14 6/13/2020 10:45 AM   SpO2 92 % 6/13/2020 10:53 AM   Vitals shown include unvalidated device data.        Post Anesthesia Care and Evaluation    Patient location during evaluation: bedside  Patient participation: complete - patient participated  Level of consciousness: awake  Pain score: 0  Pain management: adequate  Airway patency: patent  Anesthetic complications: No anesthetic complications  PONV Status: none  Cardiovascular status: acceptable  Respiratory status: acceptable  Hydration status: acceptable

## 2020-06-13 NOTE — OP NOTE
TOTAL HIP ARTHROPLASTY     PATIENT NAME:  Christianne Ordonez     YOB: 1929     ATTENDING PHYSICIAN: Fortino Moreno MD     DATE OF PROCEDURE: 6/13/2020     PREOPERATIVE DIAGNOSIS: Displaced femur neck fracture right hip intracapsular, comminuted.     Post-Op Diagnosis Codes:  Same    PRINCIPAL DIAGNOSIS: Displaced femur neck fracture comminuted intracapsular.    PROCEDURE: Right bipolar hip arthroplasty, direct anterior.    SURGEON:  Marcos Jean M.D.    ASSISTANT: Yanick Kate, first assistant.    ANESTHESIOLOGIST: Dr. Twin Pollack MD.    ANESTHESIA: General with an LMA.    POSITION: Supine on a Huffman table.    DRAINS: None    COMPLICATIONS: None    ESTIMATED BLOOD LOSS: 100 mL    SPECIMENS: * No orders in the log *    IMPLANT USED: Caprice press-fit bipolar hip replacement system, 45 mm diameter bipolar head, 0 mm neck length, a #6 collared HA-coated press-fit, Avenir complete femoral stem.    INDICATION: The patient had fallen and sustained a fracture of the femur neck which was comminuted and by definition this was a procedure that was indicated for the welfare of this patient and to prevent any complications listed below.  All risks and benefits, potential complications, possibility of death, infection, myocardial infarction, DVT, pulmonary embolism, wound and soft tissue breakdown, dislocation of the prosthesis, limb length discrepancy, etc. were all discussed with the patient and an informed consent was signed.     DETAILS OF PROCEDURE: Surgical timeout was called. Operative extremity was correctly identified in the operating room suite. Patient was given antibiotics per the SCIP protocol.  Patient was placed under appropriate anesthetic. C-arm was used through the entire procedure to accurately evaluate the limb lengths and the stability of the components as well as appropriate positioning of the components in the proximal femur and the acetabulum.  Patient was placed on the Huffman table, prepped  and draped in a standard fashion.  A direct anterior approach was carried out.      The fascia over the TFL was incised. The TFL was retracted laterally. A Cobra retractor was placed on the superior aspect of the femoral neck. A second Cobra was placed on the inferior aspect of the femoral neck. The leg was manipulated and the anterior capsule was carefully identified. The rectus femoris was retracted medially. Distally, the vastus lateralis was mobilized with a Beckham elevator and L-shaped capsulotomy was carried out and the flaps of the capsule were developed. FiberWire sutures were placed in the flaps of the capsule to allow for mobilization and subsequent closure of the soft tissue envelope. The Cobra retractors were placed subperiosteally, one inferior to the femoral neck and one superior to the femoral neck. The dissection was carried out up to the saddle of the greater trochanter laterally. The femoral osteotomy was carried out along the line of the broach. A napkin ring segment of femoral neck was removed to allow easy removal of the femoral head. A motorized corkscrew was used and the femoral head was removed from the acetabulum.  The size of the head was 45 mm in diameter.  This provided the best suction fit and stability in the acetabulum.    Attention was then directed towards the proximal femur.  The proximal femur was delivered into the wound with a combination of adduction, extension and external rotation.  The pigtail device was placed subperiosteally under the proximal femur and attached to the [place]edo table to stabilize the femur for instrumentation.  The piriformis fossa was cleared of all soft tissue.  The cancellus bone on the internal aspect of the lateral femur was removed with a curette to minimize the malpositioning of the broaches into varus.  The rat tailed device was then used to enter into the femoral canal.  We then commenced broaching with the smallest broach.  The broach was leaned up  against the lateral cortex to minimize varus-valgus malposition.  We broached up to the point that the proximal metaphysis was well filled and there was good torsional stability to the broach.  A trial head and neck was placed and the #6 Avenir complete femur was reduced into the acetabulum.  The limb lengths were checked and found to be anatomically accurate.  Intraoperative C-arm images were obtained to determine the limb lengths and the positioning of the components.  These were found to be anatomically accurate.  The femoral canal was then lavaged with bacitracin irrigating solution and dried.  The femoral component was impacted into position and a good fit was obtained.  There was good torsional stability.  Some bone graft was packed around the femoral component to promote ingrowth.  The trunnion of the femoral component was dried and the 45 mm bipolar head ball was impacted and locked onto the femoral component.  Final reduction was carried out.  The stability and limb length were checked one more time.  There was no tendency towards dislocation of the components in any position of the lower extremity.  Limb lengths were checked on C-arm images found to be anatomical.  The capsule and the sub-periosteal structures were infiltrated with a analgesic for postoperative analgesia.  The anterior capsule was repaired with interrupted sutures.  The fascia over the TFL was repaired with interrupted suture.  Subcuticular sutures applied.  Occlusive dressing was applied.  Sponge gauze and needle count was correct.  No complications were encountered.  Patient was reversed from the anesthetic and taken from the operating room to the recovery room in stable condition.  No complications encountered.  I discussed the satisfactory performance is procedure with the patient's family and answered all questions for them.     Marcos Jean MD  6/13/2020  10:16

## 2020-06-13 NOTE — ADDENDUM NOTE
Addendum  created 06/13/20 1430 by Jan Pollack MD    Attestation recorded in Intraprocedure, Intraprocedure Attestations deleted, Intraprocedure Attestations filed

## 2020-06-13 NOTE — ANESTHESIA PROCEDURE NOTES
Airway  Urgency: elective    Date/Time: 6/13/2020 8:10 AM  Airway not difficult    General Information and Staff    Patient location during procedure: OR  CRNA: Lissette Aaron CRNA    Indications and Patient Condition  Indications for airway management: airway protection    Preoxygenated: yes  Mask difficulty assessment: 1 - vent by mask    Final Airway Details  Final airway type: endotracheal airway      Successful airway: ETT  Cuffed: yes   Successful intubation technique: direct laryngoscopy  Endotracheal tube insertion site: oral  Blade: Beti  Blade size: 3  ETT size (mm): 7.0  Cormack-Lehane Classification: grade I - full view of glottis  Placement verified by: chest auscultation and capnometry   Cuff volume (mL): 6  Measured from: lips  ETT/EBT  to lips (cm): 20  Number of attempts at approach: 1  Assessment: lips, teeth, and gum same as pre-op and atraumatic intubation    Additional Comments  Smooth IV induction. Trachea intubated. Cuff up. Ett secured. BEBS. Dentition intact without injury.

## 2020-06-13 NOTE — PLAN OF CARE
Problem: Fall Risk (Adult)  Goal: Identify Related Risk Factors and Signs and Symptoms  Outcome: Ongoing (interventions implemented as appropriate)  Flowsheets (Taken 6/13/2020 0038 by Queen MARY Camilo RN)  Related Risk Factors (Fall Risk): environment unfamiliar;gait/mobility problems  Signs and Symptoms (Fall Risk): presence of risk factors     Problem: Patient Care Overview  Goal: Plan of Care Review  Outcome: Ongoing (interventions implemented as appropriate)  Flowsheets  Taken 6/13/2020 1821  Progress: no change  Outcome Summary: pt Arrived from PACU at 1130. pt increasingly drowsy, unable to get up with PT. Daughter at bedside. VSS. NVI. Dressing CDI. No pain medication given at this time. No evidence of learning at this time. Will cont to monitor.  Taken 6/13/2020 1125  Plan of Care Reviewed With: patient

## 2020-06-14 LAB
ANION GAP SERPL CALCULATED.3IONS-SCNC: 7.3 MMOL/L (ref 5–15)
BASOPHILS # BLD AUTO: 0.05 10*3/MM3 (ref 0–0.2)
BASOPHILS NFR BLD AUTO: 0.3 % (ref 0–1.5)
BUN BLD-MCNC: 10 MG/DL (ref 8–23)
BUN/CREAT SERPL: 10.5 (ref 7–25)
CALCIUM SPEC-SCNC: 8.6 MG/DL (ref 8.2–9.6)
CHLORIDE SERPL-SCNC: 101 MMOL/L (ref 98–107)
CO2 SERPL-SCNC: 29.7 MMOL/L (ref 22–29)
CREAT BLD-MCNC: 0.95 MG/DL (ref 0.57–1)
DEPRECATED RDW RBC AUTO: 43.5 FL (ref 37–54)
EOSINOPHIL # BLD AUTO: 0.01 10*3/MM3 (ref 0–0.4)
EOSINOPHIL NFR BLD AUTO: 0.1 % (ref 0.3–6.2)
ERYTHROCYTE [DISTWIDTH] IN BLOOD BY AUTOMATED COUNT: 13 % (ref 12.3–15.4)
GFR SERPL CREATININE-BSD FRML MDRD: 55 ML/MIN/1.73
GLUCOSE BLD-MCNC: 132 MG/DL (ref 65–99)
HCT VFR BLD AUTO: 36.4 % (ref 34–46.6)
HGB BLD-MCNC: 12.1 G/DL (ref 12–15.9)
IMM GRANULOCYTES # BLD AUTO: 0.18 10*3/MM3 (ref 0–0.05)
IMM GRANULOCYTES NFR BLD AUTO: 0.9 % (ref 0–0.5)
LYMPHOCYTES # BLD AUTO: 1.38 10*3/MM3 (ref 0.7–3.1)
LYMPHOCYTES NFR BLD AUTO: 7.1 % (ref 19.6–45.3)
MCH RBC QN AUTO: 30.7 PG (ref 26.6–33)
MCHC RBC AUTO-ENTMCNC: 33.2 G/DL (ref 31.5–35.7)
MCV RBC AUTO: 92.4 FL (ref 79–97)
MONOCYTES # BLD AUTO: 1.67 10*3/MM3 (ref 0.1–0.9)
MONOCYTES NFR BLD AUTO: 8.6 % (ref 5–12)
NEUTROPHILS # BLD AUTO: 16.2 10*3/MM3 (ref 1.7–7)
NEUTROPHILS NFR BLD AUTO: 83 % (ref 42.7–76)
NRBC BLD AUTO-RTO: 0 /100 WBC (ref 0–0.2)
PLATELET # BLD AUTO: 230 10*3/MM3 (ref 140–450)
PMV BLD AUTO: 10.1 FL (ref 6–12)
POTASSIUM BLD-SCNC: 3.9 MMOL/L (ref 3.5–5.2)
RBC # BLD AUTO: 3.94 10*6/MM3 (ref 3.77–5.28)
SODIUM BLD-SCNC: 138 MMOL/L (ref 136–145)
WBC NRBC COR # BLD: 19.49 10*3/MM3 (ref 3.4–10.8)

## 2020-06-14 PROCEDURE — 80048 BASIC METABOLIC PNL TOTAL CA: CPT | Performed by: HOSPITALIST

## 2020-06-14 PROCEDURE — 97110 THERAPEUTIC EXERCISES: CPT

## 2020-06-14 PROCEDURE — 99024 POSTOP FOLLOW-UP VISIT: CPT | Performed by: ORTHOPAEDIC SURGERY

## 2020-06-14 PROCEDURE — 97162 PT EVAL MOD COMPLEX 30 MIN: CPT

## 2020-06-14 PROCEDURE — 85025 COMPLETE CBC W/AUTO DIFF WBC: CPT | Performed by: HOSPITALIST

## 2020-06-14 PROCEDURE — 25010000002 CEFTRIAXONE PER 250 MG: Performed by: ORTHOPAEDIC SURGERY

## 2020-06-14 RX ADMIN — POLYETHYLENE GLYCOL 3350 17 G: 17 POWDER, FOR SOLUTION ORAL at 10:53

## 2020-06-14 RX ADMIN — DOCUSATE SODIUM 100 MG: 100 CAPSULE, LIQUID FILLED ORAL at 21:08

## 2020-06-14 RX ADMIN — ACETAMINOPHEN 325 MG: 325 TABLET, FILM COATED ORAL at 10:38

## 2020-06-14 RX ADMIN — SODIUM CHLORIDE 50 ML/HR: 9 INJECTION, SOLUTION INTRAVENOUS at 13:56

## 2020-06-14 RX ADMIN — CEFTRIAXONE SODIUM 1 G: 1 INJECTION, SOLUTION INTRAVENOUS at 18:08

## 2020-06-14 RX ADMIN — PAROXETINE HYDROCHLORIDE HEMIHYDRATE 20 MG: 20 TABLET, FILM COATED ORAL at 10:43

## 2020-06-14 RX ADMIN — DOCUSATE SODIUM 100 MG: 100 CAPSULE, LIQUID FILLED ORAL at 10:54

## 2020-06-14 RX ADMIN — APIXABAN 2.5 MG: 2.5 TABLET, FILM COATED ORAL at 21:08

## 2020-06-14 RX ADMIN — DOXEPIN HYDROCHLORIDE 25 MG: 25 CAPSULE ORAL at 21:08

## 2020-06-14 RX ADMIN — APIXABAN 2.5 MG: 2.5 TABLET, FILM COATED ORAL at 09:50

## 2020-06-14 RX ADMIN — MUPIROCIN 1 APPLICATION: 20 OINTMENT TOPICAL at 21:08

## 2020-06-14 RX ADMIN — CARVEDILOL 3.12 MG: 3.12 TABLET, FILM COATED ORAL at 18:08

## 2020-06-14 RX ADMIN — DONEPEZIL HYDROCHLORIDE 10 MG: 10 TABLET, FILM COATED ORAL at 21:08

## 2020-06-14 RX ADMIN — CARVEDILOL 3.12 MG: 3.12 TABLET, FILM COATED ORAL at 09:48

## 2020-06-14 RX ADMIN — ATORVASTATIN CALCIUM 10 MG: 20 TABLET, FILM COATED ORAL at 21:08

## 2020-06-14 RX ADMIN — LEVOTHYROXINE SODIUM 75 MCG: 75 TABLET ORAL at 05:19

## 2020-06-14 RX ADMIN — QUETIAPINE FUMARATE 25 MG: 25 TABLET ORAL at 21:08

## 2020-06-14 RX ADMIN — PANTOPRAZOLE SODIUM 40 MG: 40 TABLET, DELAYED RELEASE ORAL at 05:19

## 2020-06-14 NOTE — PROGRESS NOTES
Pod 1  vss  'somewhat confused but conversive.   Can df/pf/  Dressing dry.  Pulse intact  hgb 12.1  PT/mobilization efforts  Medical support

## 2020-06-14 NOTE — PLAN OF CARE
Problem: Patient Care Overview  Goal: Plan of Care Review  Outcome: Ongoing (interventions implemented as appropriate)  Flowsheets (Taken 6/14/2020 1306)  Plan of Care Reviewed With: patient;daughter  Outcome Summary: Patient is a 89 yo female s/p R anterior LATRELL after fall at home. She lives alone and uses a RW for household for mobility at baseline. Currently presents with confusion and post-op pain and weakness. She was able to stand at RW with Min/Mod A x 2 but was unable to weight shift as needed to ambulate. Skilled PT indicated to address functional deficits and maximize level of independence after discharge. At this time PT rec's inpatient rehab after discharge dependent on progress.   ..Patient was not wearing a face mask during this therapy encounter. Therapist used appropriate personal protective equipment including mask and gloves.  Mask used was standard procedure mask. Appropriate PPE was worn during the entire therapy session. Hand hygiene was completed before and after therapy session. Patient is not in enhanced droplet precautions.

## 2020-06-14 NOTE — THERAPY EVALUATION
Patient Name: Christianne Ordonez  : 1929    MRN: [id number]                              Today's Date: 2020       Admit Date: 2020    Visit Dx:     ICD-10-CM ICD-9-CM   1. Closed fracture of right hip, initial encounter (CMS/[facility]) S72.001A 820.8   2. Fall, initial encounter W19.XXXA E888.9     Patient Active Problem List   Diagnosis   • Acquired aphasia   • A-fib (CMS/[facility])   • Atherosclerotic cerebrovascular disease   • Apoplectic attack (CMS/[facility])   • Closed displaced transverse fracture of left patella   • Dementia without behavioral disturbance (CMS/[facility])   • Mixed hyperlipidemia   • Cerebrovascular accident (CVA) due to embolism of left middle cerebral artery (CMS/[facility])   • Closed fracture of right hip (CMS/[facility])     Past Medical History:   Diagnosis Date   • Aphasia    • Arthritis    • Atrial fibrillation (CMS/[facility])    • Cerebral atherosclerosis    • Depression    • Disease of thyroid gland    • Insomnia    • Stroke (CMS/[facility])      Past Surgical History:   Procedure Laterality Date   • KNEE SURGERY     • PATELLA OPEN REDUCTION INTERNAL FIXATION Left 2019    Procedure: PATELLA OPEN REDUCTION INTERNAL FIXATION;  Surgeon: Oleg David MD;  Location: [facility];  Service: Orthopedics     General Information     Row Name 20 9895          PT Evaluation Time/Intention    Document Type  evaluation  -MW     Mode of Treatment  physical therapy  -     Row Name 20 7184          General Information    Patient Profile Reviewed?  yes  -MW     Prior Level of Function  independent:;gait RW and w/c  -MW     Existing Precautions/Restrictions  fall;hip, anterior  -MW     Barriers to Rehab  cognitive status  -     Row Name 20 2564          Cognitive Assessment/Intervention- PT/OT    Orientation Status (Cognition)  oriented to;person  -MW     Cognitive Assessment/Intervention Comment  Confused  -     Row Name 20 5889          Safety Issues, Functional Mobility    Safety Issues Affecting  Function (Mobility)  at risk behavior observed;safety precaution awareness;insight into deficits/self awareness  -     Impairments Affecting Function (Mobility)  balance;cognition;endurance/activity tolerance;strength;range of motion (ROM);pain  -     Comment, Safety Issues/Impairments (Mobility)  Gait belt and nonslip socks used for safety.  -       User Key  (r) = Recorded By, (t) = Taken By, (c) = Cosigned By    Initials Name Provider Type     Rosario Dawkins, PT Physical Therapist        Mobility     Row Name 06/14/20 1257          Bed Mobility Assessment/Treatment    Bed Mobility Assessment/Treatment  supine-sit  -     Supine-Sit [place] (Bed Mobility)  moderate assist (50% patient effort);2 person assist;maximum assist (25% patient effort)  -     Assistive Device (Bed Mobility)  bed rails;draw sheet;head of bed elevated  -     Row Name 06/14/20 1257          Transfer Assessment/Treatment    Comment (Transfers)  Able to bear weight in standing but unable to weight shift without increased difficulty for gait or transfers  -[facility] Name 06/14/20 1257          Bed-Chair Transfer    Bed-Chair [place] (Transfers)  maximum assist (25% patient effort);moderate assist (50% patient effort);2 person assist;nonverbal cues (demo/gesture);verbal cues  -     Assistive Device (Bed-Chair Transfers)  walker, standard  -[facility] Name 06/14/20 1257          Sit-Stand Transfer    Sit-Stand [place] (Transfers)  minimum assist (75% patient effort);2 person assist;verbal cues;nonverbal cues (demo/gesture);moderate assist (50% patient effort)  -     Assistive Device (Sit-Stand Transfers)  walker, standard  -     Row Name 06/14/20 1257          Gait/Stairs Assessment/Training    Gait/Stairs Assessment/Training  other (see comments)  -     Comment (Gait/Stairs)  Attempted to ambulate but had difficulty taking steps and became quickly fatigued and unable to further WB at RLE so transferred to  chair.  -[facility] Name 06/14/20 1257          Mobility Assessment/Intervention    Extremity Weight-bearing Status  right upper extremity  -MW     Right Upper Extremity (Weight-bearing Status)  weight-bearing as tolerated (WBAT)  -       User Key  (r) = Recorded By, (t) = Taken By, (c) = Cosigned By    Initials Name Provider Type    Rosario Goodman PT Physical Therapist        Obj/Interventions     [facility] Name 06/14/20 1303          General ROM    GENERAL ROM COMMENTS  Impaired RLE  -MW     [facility] Name 06/14/20 1303          MMT (Manual Muscle Testing)    General MMT Comments  Post-op weakness RLE  -[facility] Name 06/14/20 1303          Static Sitting Balance    Level of [place] (Unsupported Sitting, Static Balance)  minimal assist, 75% patient effort;contact guard assist  -     Sitting Position (Unsupported Sitting, Static Balance)  sitting on edge of bed  -     Time Able to Maintain Position (Unsupported Sitting, Static Balance)  30 to 45 seconds  -MW     Row Name 06/14/20 1303          Static Standing Balance    Level of [place] (Supported Standing, Static Balance)  minimal assist, 75% patient effort  -     Assistive Device Utilized (Supported Standing, Static Balance)  walker, rolling  -       User Key  (r) = Recorded By, (t) = Taken By, (c) = Cosigned By    Initials Name Provider Type    Rosario Goodman PT Physical Therapist        Goals/Plan     [facility] Name 06/14/20 1308          Bed Mobility Goal 1 (PT)    Activity/Assistive Device (Bed Mobility Goal 1, PT)  bed mobility activities, all  -     [place] Level/Cues Needed (Bed Mobility Goal 1, PT)  minimum assist (75% or more patient effort)  -     Time Frame (Bed Mobility Goal 1, PT)  3 days  -[facility] Name 06/14/20 1308          Transfer Goal 1 (PT)    Activity/Assistive Device (Transfer Goal 1, PT)  transfers, all;walker, rolling  -     [place] Level/Cues Needed (Transfer Goal 1, PT)  minimum assist (75% or more patient  effort)  -MW     Time Frame (Transfer Goal 1, PT)  3 days  -     Row Name 06/14/20 1300          Gait Training Goal 1 (PT)    Activity/Assistive Device (Gait Training Goal 1, PT)  gait (walking locomotion);assistive device use  -MW     [place] Level (Gait Training Goal 1, PT)  minimum assist (75% or more patient effort)  -MW     Distance (Gait Goal 1, PT)  25 ft  -MW     Time Frame (Gait Training Goal 1, PT)  3 days  -       User Key  (r) = Recorded By, (t) = Taken By, (c) = Cosigned By    Initials Name Provider Type    MW Rosario Dawkins, PT Physical Therapist        Clinical Impression     Row Name 06/14/20 9972          Pain Assessment    Additional Documentation  Pain Scale: FACES Pre/Post-Treatment (Group)  -[facility] Name 06/14/20 6107          Pain Scale: Numbers Pre/Post-Treatment    Pain Location - Side  Right  -     Pain Location  hip  -     Row Name 06/14/20 2149          Pain Scale: FACES Pre/Post-Treatment    Pain: FACES Scale, Pretreatment  0-->no hurt  -     Pain: FACES Scale, Post-Treatment  2-->hurts little bit  -     Row Name 06/14/20 6471          Plan of Care Review    Plan of Care Reviewed With  patient;daughter  -     Outcome Summary  Patient is a 89 yo female s/p R anterior LATRELL after fall at home. She lives alone and uses a RW for household for mobility at baseline. Currently presents with confusion and post-op pain and weakness. She was able to stand at RW with Min/Mod A x 2 but was unable to weight shift as needed to ambulate. Skilled PT indicated to address functional deficits and maximize level of independence after discharge. At this time PT rec's inpatient rehab after discharge dependent on progress.  -     Row Name 06/14/20 8798          Physical Therapy Clinical Impression    Criteria for Skilled Interventions Met (PT Clinical Impression)  yes;treatment indicated  -MW     Rehab Potential (PT Clinical Summary)  good, to achieve stated therapy goals  -      Predicted Duration of Therapy (PT)  1 week  -MW     Row Name 06/14/20 1304          Vital Signs    O2 Delivery Pre Treatment  supplemental O2  -MW     O2 Delivery Intra Treatment  supplemental O2  -MW     O2 Delivery Post Treatment  supplemental O2  -MW     Pre Patient Position  Supine  -MW     Intra Patient Position  Standing  -MW     Post Patient Position  Sitting  -MW     Row Name 06/14/20 1304          Positioning and Restraints    Pre-Treatment Position  in bed  -MW     Post Treatment Position  chair  -MW     In Chair  notified nsg;reclined;sitting;call light within reach;encouraged to call for assist;exit alarm on;with family/caregiver  -MW       User Key  (r) = Recorded By, (t) = Taken By, (c) = Cosigned By    Initials Name Provider Type    Rosario Goodman PT Physical Therapist        Outcome Measures     Row Name 06/14/20 1309          How much help from another person do you currently need...    Turning from your back to your side while in flat bed without using bedrails?  2  -MW     Moving from lying on back to sitting on the side of a flat bed without bedrails?  2  -MW     Moving to and from a bed to a chair (including a wheelchair)?  2  -MW     Standing up from a chair using your arms (e.g., wheelchair, bedside chair)?  3  -MW     Climbing 3-5 steps with a railing?  1  -MW     To walk in hospital room?  2  -MW     AM-PAC 6 Clicks Score (PT)  12  -MW     Row Name 06/14/20 1309          Functional Assessment    Outcome Measure Options  AM-PAC 6 Clicks Basic Mobility (PT)  -MW       User Key  (r) = Recorded By, (t) = Taken By, (c) = Cosigned By    Initials Name Provider Type    Rosario Goodman PT Physical Therapist        Physical Therapy Education                 Title: PT OT SLP Therapies (In Progress)     Topic: Physical Therapy (In Progress)     Point: Mobility training (In Progress)     Description:   Instruct learner(s) on safety and technique for assisting patient out of bed, chair or  wheelchair.  Instruct in the proper use of assistive devices, such as walker, crutches, cane or brace.              Patient Friendly Description:   It's important to get you on your feet again, but we need to do so in a way that is safe for you. Falling has serious consequences, and your personal safety is the most important thing of all.        When it's time to get out of bed, one of us or a family member will sit next to you on the bed to give you support.     If your doctor or nurse tells you to use a walker, crutches, a cane, or a brace, be sure you use it every time you get out of bed, even if you think you don't need it.    Learning Progress Summary           Patient Acceptance, E, NR by  at 6/14/2020 1310   Family Acceptance, E, NR by  at 6/14/2020 1310                   Point: Body mechanics (In Progress)     Description:   Instruct learner(s) on proper positioning and spine alignment for patient and/or caregiver during mobility tasks and/or exercises.              Learning Progress Summary           Patient Acceptance, E, NR by  at 6/14/2020 1310   Family Acceptance, E, NR by MW at 6/14/2020 1310                   Point: Precautions (In Progress)     Description:   Instruct learner(s) on prescribed precautions during mobility and gait tasks              Learning Progress Summary           Patient Acceptance, E, NR by  at 6/14/2020 1310   Family Acceptance, E, NR by MW at 6/14/2020 1310                               User Key     Initials Effective Dates Name Provider Type Discipline     09/17/19 -  Rosario Dawkins, PT Physical Therapist PT              PT Recommendation and Plan  Planned Therapy Interventions (PT Eval): balance training, gait training, bed mobility training, home exercise program, patient/family education, ROM (range of motion), strengthening, transfer training  Outcome Summary/Treatment Plan (PT)  Anticipated Discharge Disposition (PT): skilled nursing facility, inpatient  rehabilitation facility  Plan of Care Reviewed With: patient, daughter  Outcome Summary: Patient is a 89 yo female s/p R anterior LATRELL after fall at home. She lives alone and uses a RW for household for mobility at baseline. Currently presents with confusion and post-op pain and weakness. She was able to stand at RW with Min/Mod A x 2 but was unable to weight shift as needed to ambulate. Skilled PT indicated to address functional deficits and maximize level of independence after discharge. At this time PT rec's inpatient rehab after discharge dependent on progress.     Time Calculation:   PT Charges     Row Name 06/14/20 1311 06/14/20 1211          Time Calculation    Start Time  1146  -MW  --     Stop Time  1205  -MW  --     Time Calculation (min)  19 min  -MW  --     PT Received On  06/14/20  -MW  06/14/20  -MW     PT - Next Appointment  06/15/20  -MW  --     PT Goal Re-Cert Due Date  06/21/20  -MW  --        Time Calculation- PT    Total Timed Code Minutes- PT  17 minute(s)  -MW  --       User Key  (r) = Recorded By, (t) = Taken By, (c) = Cosigned By    Initials Name Provider Type    Rosario Goodman PT Physical Therapist        Therapy Charges for Today     Code Description Service Date Service Provider Modifiers Qty    00437618788 HC PT EVAL MOD COMPLEXITY 2 6/14/2020 Rosario Dawkins, PT GP 1    98798615130 HC PT THER PROC EA 15 MIN 6/14/2020 Rosario Dawkins, PT GP 1    44097371089 HC PT THER SUPP EA 15 MIN 6/14/2020 Rosario Dawkins, PT GP 1          PT G-Codes  Outcome Measure Options: AM-PAC 6 Clicks Basic Mobility (PT)  AM-PAC 6 Clicks Score (PT): 12    Rosario Dawkins PT  6/14/2020

## 2020-06-14 NOTE — PLAN OF CARE
Problem: Patient Care Overview  Goal: Plan of Care Review  Flowsheets (Taken 6/14/2020 0043)  Progress: improving  Plan of Care Reviewed With: patient  Outcome Summary: POD#1. VSS. PATIENT IS CONFUSED. MEDS GIVEN IN APPLE SAUCE. PUREWICK IN PLACE. UNABLE TO GET PATIENT UP. ZERO COMPLAIN OF PAIN. EDUCATION PROVIDED ON BLOOD PRESURE MONITORING. NO EVIDENCE OF LEARNING.

## 2020-06-14 NOTE — PROGRESS NOTES
"Daily progress note    Chief complaint  Doing same  No new complaints   Family at bedside    History of present illness  89-year-old white female with history of coronary artery disease status post CVA osteoarthritis depression hypothyroidism and gastroesophageal of disease brought to the emergency room after the fall with right hip pain.  According to family she lost her balance and fell.  Patient is on Eliquis for chronic atrial fibrillation.  No head injury and work-up in ER revealed right hip fracture admit for management.  At the time of interview she is awake and alert hurting at the right hip no other complaint.  Patient unable to give history most of the history obtained from the chart nursing staff old record and family.  Patient has no fever cough shortness of breath chest pain abdominal pain nausea vomiting diarrhea.     REVIEW OF SYSTEMS  Limited due to the patient's dementia     PHYSICAL EXAM   Blood pressure 108/69, pulse 95, temperature 97.7 °F (36.5 °C), temperature source Skin, resp. rate 17, height 167.6 cm (65.98\"), weight 75.8 kg (167 lb 1.7 oz), SpO2 92 %.    GENERAL: Uncomfortable, nontoxic  HENT: nares patent, scalp is atraumatic  EYES: no scleral icterus  CV: irregular rhythm, regular rate, 2+ right DP pulse  RESPIRATORY: normal effort, clear to auscultation bilaterally  ABDOMEN: soft, nontender  MUSCULOSKELETAL: Right leg is shortened and externally rotated with tenderness to the right greater trochanter, no pain outpatient of the upper extremities or left leg, no C-spine tenderness with full range of motion of the neck  NEURO: alert, moves all extremities, follows commands  SKIN: warm, dry     LAB RESULTS  Lab Results (last 24 hours)     Procedure Component Value Units Date/Time    Basic Metabolic Panel [[id number]]  (Abnormal) Collected:  06/14/20 0458    Specimen:  Blood Updated:  06/14/20 0553     Glucose 132 mg/dL      BUN 10 mg/dL      Creatinine 0.95 mg/dL      Sodium 138 mmol/L      " Potassium 3.9 mmol/L      Chloride 101 mmol/L      CO2 29.7 mmol/L      Calcium 8.6 mg/dL      eGFR Non African Amer 55 mL/min/1.73      BUN/Creatinine Ratio 10.5     Anion Gap 7.3 mmol/L     Narrative:       GFR Normal >60  Chronic Kidney Disease <60  Kidney Failure <15      CBC & Differential [[id number]] Collected:  06/14/20 0458    Specimen:  Blood Updated:  06/14/20 0531    Narrative:       The following orders were created for panel order CBC & Differential.  Procedure                               Abnormality         Status                     ---------                               -----------         ------                     CBC Auto Differential[[id number]]        Abnormal            Final result                 Please view results for these tests on the individual orders.    CBC Auto Differential [[id number]]  (Abnormal) Collected:  06/14/20 0458    Specimen:  Blood Updated:  06/14/20 0531     WBC 19.49 10*3/mm3      RBC 3.94 10*6/mm3      Hemoglobin 12.1 g/dL      Hematocrit 36.4 %      MCV 92.4 fL      MCH 30.7 pg      MCHC 33.2 g/dL      RDW 13.0 %      RDW-SD 43.5 fl      MPV 10.1 fL      Platelets 230 10*3/mm3      Neutrophil % 83.0 %      Lymphocyte % 7.1 %      Monocyte % 8.6 %      Eosinophil % 0.1 %      Basophil % 0.3 %      Immature Grans % 0.9 %      Neutrophils, Absolute 16.20 10*3/mm3      Lymphocytes, Absolute 1.38 10*3/mm3      Monocytes, Absolute 1.67 10*3/mm3      Eosinophils, Absolute 0.01 10*3/mm3      Basophils, Absolute 0.05 10*3/mm3      Immature Grans, Absolute 0.18 10*3/mm3      nRBC 0.0 /100 WBC         Imaging Results (Last 24 Hours)     ** No results found for the last 24 hours. **        ECG 12 Lead        HEART RATE= 67  bpm  RR Interval= 908  ms  MO Interval= 203  ms  P Horizontal Axis= 46  deg  P Front Axis= 7  deg  QRSD Interval= 93  ms  QT Interval= 419  ms  QRS Axis= -46  deg  T Wave Axis= 91  deg  - ABNORMAL ECG -  Sinus rhythm  Atrial premature complex  Left anterior  fascicular block  Low voltage, precordial leads  NO SIGNIFICANT CHANGE FROM PREVIOUS ECG             Current Facility-Administered Medications:   •  acetaminophen (TYLENOL) tablet 325 mg, 325 mg, Oral, Q4H PRN, Marcos Jean MD, 325 mg at 06/14/20 1038  •  apixaban (ELIQUIS) tablet 2.5 mg, 2.5 mg, Oral, BID, Marcos Jean MD, 2.5 mg at 06/14/20 0950  •  atorvastatin (LIPITOR) tablet 10 mg, 10 mg, Oral, Nightly, Marcos Jean MD, 10 mg at 06/13/20 2200  •  carvedilol (COREG) tablet 3.125 mg, 3.125 mg, Oral, BID With Meals, Marcos Jean MD, 3.125 mg at 06/14/20 0948  •  cefTRIAXone (ROCEPHIN) IVPB 1 g, 1 g, Intravenous, Q24H, Marcos Jean MD, Last Rate: 100 mL/hr at 06/13/20 1755, 1 g at 06/13/20 1755  •  docusate sodium (COLACE) capsule 100 mg, 100 mg, Oral, BID, Marcos Jean MD, 100 mg at 06/14/20 1054  •  donepezil (ARICEPT) tablet 10 mg, 10 mg, Oral, Nightly, Marcos Jean MD, 10 mg at 06/13/20 2150  •  doxepin (SINEquan) capsule 25 mg, 25 mg, Oral, Nightly, Marcos Jean MD, 25 mg at 06/13/20 2159  •  hydrALAZINE (APRESOLINE) injection 5 mg, 5 mg, Intravenous, Q10 Min PRN, Marcos Jean MD  •  HYDROcodone-acetaminophen (NORCO) 7.5-325 MG per tablet 1 tablet, 1 tablet, Oral, Once PRN, Marcos Jean MD  •  HYDROcodone-acetaminophen (NORCO) 7.5-325 MG per tablet 1 tablet, 1 tablet, Oral, Q4H PRN **OR** HYDROcodone-acetaminophen (NORCO) 7.5-325 MG per tablet 2 tablet, 2 tablet, Oral, Q4H PRN, Marcos Jean MD  •  HYDROmorphone (DILAUDID) injection 0.25 mg, 0.25 mg, Intravenous, Q5 Min PRN, Marcos Jean MD  •  levothyroxine (SYNTHROID, LEVOTHROID) tablet 75 mcg, 75 mcg, Oral, Q AM, Marcos Jean MD, 75 mcg at 06/14/20 0519  •  LORazepam (ATIVAN) tablet 0.5 mg, 0.5 mg, Oral, Q6H PRN, Marcos Jean MD  •  melatonin tablet 1 mg, 1 mg, Oral, Nightly PRN, Marcos Jean MD  •  morphine injection 2 mg, 2 mg, Intravenous, Q2H PRN, 2 mg at 06/13/20 0340 **FOLLOWED BY** [DISCONTINUED] morphine injection 2 mg,  2 mg, Intravenous, Q2H PRN, Emily Hernandez MD  •  mupirocin (BACTROBAN) 2 % nasal ointment, , Each Nare, BID, Marcos Jean MD, 1 application at 06/13/20 2200  •  ondansetron (ZOFRAN) tablet 4 mg, 4 mg, Oral, Q6H PRN **OR** ondansetron (ZOFRAN) injection 4 mg, 4 mg, Intravenous, Q6H PRN, Marcos Jean MD  •  pantoprazole (PROTONIX) EC tablet 40 mg, 40 mg, Oral, Q AM, Marcos Jean MD, 40 mg at 06/14/20 0519  •  PARoxetine (PAXIL) tablet 20 mg, 20 mg, Oral, Daily, Marcos Jean MD, 20 mg at 06/14/20 1043  •  polyethylene glycol 3350 powder (packet), 17 g, Oral, Daily, Marcos Jean MD, 17 g at 06/14/20 1053  •  QUEtiapine (SEROquel) tablet 25 mg, 25 mg, Oral, Nightly, Marcos Jean MD, 25 mg at 06/13/20 2150  •  [COMPLETED] Insert peripheral IV, , , Once **AND** sodium chloride 0.9 % flush 10 mL, 10 mL, Intravenous, PRN, Marcos Jean MD, 10 mL at 06/12/20 1403  •  sodium chloride 0.9 % infusion, 50 mL/hr, Intravenous, Continuous, Marcos Jean MD, Last Rate: 50 mL/hr at 06/14/20 1356, 50 mL/hr at 06/14/20 1356     ASSESSMENT  Acute right hip femoral neck fracture  Status post fall  Probable acute UTI  Chronic atrial fibrillation on Eliquis  Dementia  Depression  Hyperlipidemia  Hypertension  Hypothyroidism  Anxiety disorder  Osteoarthritis  Gastroesophageal reflux disease    PLAN  CPM  Postop care  IV fluids  IV antibiotics  Pain management  Resume Eliquis  Orthopedic surgery consult appreciated  Continue home medications  Stress ulcer DVT prophylaxis  Supportive care  Discussed with family and nursing staff  DNR  PT/OT  Follow closely further recommendation according to hospital course    EMILY HERNANDEZ MD

## 2020-06-14 NOTE — PLAN OF CARE
Problem: Patient Care Overview  Goal: Plan of Care Review  Outcome: Ongoing (interventions implemented as appropriate)  Flowsheets (Taken 6/14/2020 1722)  Progress: improving  Plan of Care Reviewed With: patient  Note:   VSS, Tachycardic. Unable to wean off O2, remains on 1 L NC. Tylenol given for pain x1. Appears to rest comfortably between care. Up to chair and worked with PT today. Voiding per scar, brief in place.  Takes meds in applesauce, Tolerates mech soft ground diet. Discussed monitoring heart rate and medication r/t Afib, no evidence of learning r/t confusion. Plans to d/c to rehab when stable.

## 2020-06-15 LAB
ANION GAP SERPL CALCULATED.3IONS-SCNC: 10.6 MMOL/L (ref 5–15)
BASOPHILS # BLD AUTO: 0.06 10*3/MM3 (ref 0–0.2)
BASOPHILS NFR BLD AUTO: 0.4 % (ref 0–1.5)
BUN BLD-MCNC: 9 MG/DL (ref 8–23)
BUN/CREAT SERPL: 10.2 (ref 7–25)
CALCIUM SPEC-SCNC: 8 MG/DL (ref 8.2–9.6)
CHLORIDE SERPL-SCNC: 104 MMOL/L (ref 98–107)
CO2 SERPL-SCNC: 27.4 MMOL/L (ref 22–29)
CREAT BLD-MCNC: 0.88 MG/DL (ref 0.57–1)
DEPRECATED RDW RBC AUTO: 44.8 FL (ref 37–54)
EOSINOPHIL # BLD AUTO: 0.29 10*3/MM3 (ref 0–0.4)
EOSINOPHIL NFR BLD AUTO: 1.8 % (ref 0.3–6.2)
ERYTHROCYTE [DISTWIDTH] IN BLOOD BY AUTOMATED COUNT: 13.2 % (ref 12.3–15.4)
GFR SERPL CREATININE-BSD FRML MDRD: 60 ML/MIN/1.73
GLUCOSE BLD-MCNC: 120 MG/DL (ref 65–99)
GLUCOSE BLDC GLUCOMTR-MCNC: 107 MG/DL (ref 70–130)
HCT VFR BLD AUTO: 34.3 % (ref 34–46.6)
HGB BLD-MCNC: 11.4 G/DL (ref 12–15.9)
IMM GRANULOCYTES # BLD AUTO: 0.1 10*3/MM3 (ref 0–0.05)
IMM GRANULOCYTES NFR BLD AUTO: 0.6 % (ref 0–0.5)
LYMPHOCYTES # BLD AUTO: 2.21 10*3/MM3 (ref 0.7–3.1)
LYMPHOCYTES NFR BLD AUTO: 14 % (ref 19.6–45.3)
MCH RBC QN AUTO: 30.6 PG (ref 26.6–33)
MCHC RBC AUTO-ENTMCNC: 33.2 G/DL (ref 31.5–35.7)
MCV RBC AUTO: 92.2 FL (ref 79–97)
MONOCYTES # BLD AUTO: 1.42 10*3/MM3 (ref 0.1–0.9)
MONOCYTES NFR BLD AUTO: 9 % (ref 5–12)
NEUTROPHILS # BLD AUTO: 11.68 10*3/MM3 (ref 1.7–7)
NEUTROPHILS NFR BLD AUTO: 74.2 % (ref 42.7–76)
NRBC BLD AUTO-RTO: 0.1 /100 WBC (ref 0–0.2)
PLATELET # BLD AUTO: 204 10*3/MM3 (ref 140–450)
PMV BLD AUTO: 10.3 FL (ref 6–12)
POTASSIUM BLD-SCNC: 4 MMOL/L (ref 3.5–5.2)
RBC # BLD AUTO: 3.72 10*6/MM3 (ref 3.77–5.28)
SODIUM BLD-SCNC: 142 MMOL/L (ref 136–145)
WBC NRBC COR # BLD: 15.76 10*3/MM3 (ref 3.4–10.8)

## 2020-06-15 PROCEDURE — 25010000002 CEFTRIAXONE PER 250 MG: Performed by: ORTHOPAEDIC SURGERY

## 2020-06-15 PROCEDURE — 97530 THERAPEUTIC ACTIVITIES: CPT

## 2020-06-15 PROCEDURE — 80048 BASIC METABOLIC PNL TOTAL CA: CPT | Performed by: HOSPITALIST

## 2020-06-15 PROCEDURE — 97166 OT EVAL MOD COMPLEX 45 MIN: CPT

## 2020-06-15 PROCEDURE — 82962 GLUCOSE BLOOD TEST: CPT

## 2020-06-15 PROCEDURE — 85025 COMPLETE CBC W/AUTO DIFF WBC: CPT | Performed by: HOSPITALIST

## 2020-06-15 PROCEDURE — 97110 THERAPEUTIC EXERCISES: CPT

## 2020-06-15 PROCEDURE — 99024 POSTOP FOLLOW-UP VISIT: CPT | Performed by: ORTHOPAEDIC SURGERY

## 2020-06-15 RX ADMIN — APIXABAN 2.5 MG: 2.5 TABLET, FILM COATED ORAL at 08:43

## 2020-06-15 RX ADMIN — DOCUSATE SODIUM 100 MG: 100 CAPSULE, LIQUID FILLED ORAL at 08:44

## 2020-06-15 RX ADMIN — DONEPEZIL HYDROCHLORIDE 10 MG: 10 TABLET, FILM COATED ORAL at 20:51

## 2020-06-15 RX ADMIN — APIXABAN 2.5 MG: 2.5 TABLET, FILM COATED ORAL at 20:50

## 2020-06-15 RX ADMIN — ATORVASTATIN CALCIUM 10 MG: 20 TABLET, FILM COATED ORAL at 20:51

## 2020-06-15 RX ADMIN — PAROXETINE HYDROCHLORIDE HEMIHYDRATE 20 MG: 20 TABLET, FILM COATED ORAL at 08:43

## 2020-06-15 RX ADMIN — CARVEDILOL 3.12 MG: 3.12 TABLET, FILM COATED ORAL at 18:50

## 2020-06-15 RX ADMIN — PANTOPRAZOLE SODIUM 40 MG: 40 TABLET, DELAYED RELEASE ORAL at 08:44

## 2020-06-15 RX ADMIN — CARVEDILOL 3.12 MG: 3.12 TABLET, FILM COATED ORAL at 08:43

## 2020-06-15 RX ADMIN — QUETIAPINE FUMARATE 25 MG: 25 TABLET ORAL at 20:51

## 2020-06-15 RX ADMIN — DOCUSATE SODIUM 100 MG: 100 CAPSULE, LIQUID FILLED ORAL at 20:51

## 2020-06-15 RX ADMIN — POLYETHYLENE GLYCOL 3350 17 G: 17 POWDER, FOR SOLUTION ORAL at 08:43

## 2020-06-15 RX ADMIN — LEVOTHYROXINE SODIUM 75 MCG: 75 TABLET ORAL at 08:44

## 2020-06-15 RX ADMIN — CEFTRIAXONE SODIUM 1 G: 1 INJECTION, SOLUTION INTRAVENOUS at 18:49

## 2020-06-15 RX ADMIN — DOXEPIN HYDROCHLORIDE 25 MG: 25 CAPSULE ORAL at 20:51

## 2020-06-15 NOTE — PLAN OF CARE
Problem: Patient Care Overview  Goal: Plan of Care Review  Outcome: Ongoing (interventions implemented as appropriate)  Flowsheets  Taken 6/14/2020 2000  Plan of Care Reviewed With: patient  Taken 6/15/2020 0342  Outcome Summary: Pt is a 89/F POD#2 of r LATRELL after a fall at home. Dressing C/D/I. Voiding per purewick. 2L O2 nasal cannula. VSS. Voiding per purewick. Unable to ambulate with PT yesterday. Plan is for rehab at d/c. Will continue to monitor.

## 2020-06-15 NOTE — PROGRESS NOTES
Orthopedic Progress Note      Patient: Christianne Ordonez    YOB: 1929    Medical Record Number: [id number]    Attending Physician: Fortino Moreno MD    Date of Admission: 6/11/2020 11:28 PM    Admitting Dx:  Fall, initial encounter [W19.XXXA]  Closed fracture of right hip, initial encounter (CMS/[facility]) [S72.001A]    Status Post: Left bipolar hip arthroplasty, direct anterior.    Post Operative Day Number: 2    Current Problem List:   Patient Active Problem List   Diagnosis   • Acquired aphasia   • A-fib (CMS/[facility])   • Atherosclerotic cerebrovascular disease   • Apoplectic attack (CMS/[facility])   • Closed displaced transverse fracture of left patella   • Dementia without behavioral disturbance (CMS/[facility])   • Mixed hyperlipidemia   • Cerebrovascular accident (CVA) due to embolism of left middle cerebral artery (CMS/[facility])   • Closed fracture of right hip (CMS/[facility])         Past Medical History:   Diagnosis Date   • Aphasia    • Arthritis    • Atrial fibrillation (CMS/[facility])    • Cerebral atherosclerosis    • Depression    • Disease of thyroid gland    • Insomnia    • Stroke (CMS/[facility])        Current Medications:  Scheduled Meds:  apixaban 2.5 mg Oral BID   atorvastatin 10 mg Oral Nightly   carvedilol 3.125 mg Oral BID With Meals   cefTRIAXone 1 g Intravenous Q24H   docusate sodium 100 mg Oral BID   donepezil 10 mg Oral Nightly   doxepin 25 mg Oral Nightly   levothyroxine 75 mcg Oral Q AM   pantoprazole 40 mg Oral Q AM   PARoxetine 20 mg Oral Daily   polyethylene glycol 17 g Oral Daily   QUEtiapine 25 mg Oral Nightly     PRN Meds:.•  acetaminophen  •  HYDROcodone-acetaminophen **OR** HYDROcodone-acetaminophen  •  LORazepam  •  melatonin  •  [DISCONTINUED] ondansetron **OR** ondansetron  •  [COMPLETED] Insert peripheral IV **AND** sodium chloride    SUBJECTIVE: 89 y.o.  female. Awake and cooperative    OBJECTIVE:   Vitals:    06/14/20 1920 06/14/20 2253 06/15/20 0338 06/15/20 0724   BP: 127/69 109/71 113/74 121/65   BP  Location: Left arm Left arm Right arm Right arm   Patient Position: Lying Lying Lying Lying   Pulse: 84 85 90 89   Resp: 16 16 16 16   Temp: 98 °F (36.7 °C) 97.5 °F (36.4 °C) 97.5 °F (36.4 °C) 97.6 °F (36.4 °C)   TempSrc: Skin Skin Skin Skin   SpO2: 92% 93% 94% 95%   Weight:       Height:         I/O last 3 completed shifts:  In: 1290 [P.O.:60; I.V.:1230]  Out: 1200 [Urine:1200]    Diagnostic Tests:   Lab Results (last 24 hours)     Procedure Component Value Units Date/Time    Basic Metabolic Panel [[id number]]  (Abnormal) Collected:  06/15/20 0324    Specimen:  Blood Updated:  06/15/20 0414     Glucose 120 mg/dL      BUN 9 mg/dL      Creatinine 0.88 mg/dL      Sodium 142 mmol/L      Potassium 4.0 mmol/L      Chloride 104 mmol/L      CO2 27.4 mmol/L      Calcium 8.0 mg/dL      eGFR Non African Amer 60 mL/min/1.73      BUN/Creatinine Ratio 10.2     Anion Gap 10.6 mmol/L     Narrative:       GFR Normal >60  Chronic Kidney Disease <60  Kidney Failure <15      CBC & Differential [[id number]] Collected:  06/15/20 0325    Specimen:  Blood Updated:  06/15/20 0349    Narrative:       The following orders were created for panel order CBC & Differential.  Procedure                               Abnormality         Status                     ---------                               -----------         ------                     CBC Auto Differential[[id number]]        Abnormal            Final result                 Please view results for these tests on the individual orders.    CBC Auto Differential [[id number]]  (Abnormal) Collected:  06/15/20 0325    Specimen:  Blood Updated:  06/15/20 0349     WBC 15.76 10*3/mm3      RBC 3.72 10*6/mm3      Hemoglobin 11.4 g/dL      Hematocrit 34.3 %      MCV 92.2 fL      MCH 30.6 pg      MCHC 33.2 g/dL      RDW 13.2 %      RDW-SD 44.8 fl      MPV 10.3 fL      Platelets 204 10*3/mm3      Neutrophil % 74.2 %      Lymphocyte % 14.0 %      Monocyte % 9.0 %      Eosinophil % 1.8 %      Basophil %  0.4 %      Immature Grans % 0.6 %      Neutrophils, Absolute 11.68 10*3/mm3      Lymphocytes, Absolute 2.21 10*3/mm3      Monocytes, Absolute 1.42 10*3/mm3      Eosinophils, Absolute 0.29 10*3/mm3      Basophils, Absolute 0.06 10*3/mm3      Immature Grans, Absolute 0.10 10*3/mm3      nRBC 0.1 /100 WBC           PHYSICAL EXAM:  Right anterior hip dressing dry and intact.  Wiggles toes upon command.  Calf soft and nontender.  Appears comfortable at present.  Hg 11.4.  Incontinent of urine.  Purwick in place.  ABD soft with BS.  No BM.       ASSESSMENT & PLAN:    Slow progress with PT.  Continue to mobilize patient as tolerated.      Back on Eliquis.      Plans to go to SNF for rehab post discharge.  Will RTO in 2-3 weeks to see Dr. Jean    Date: 6/15/2020    OLAYINKA Mendoza MD

## 2020-06-15 NOTE — DISCHARGE PLACEMENT REQUEST
"Christianne Ordonez (89 y.o. Female)     Date of Birth Social Security Number Address Home Phone MRN    05/08/1929  [id number] [id number] [phone] [id number]    Congregation Marital Status          O[employer] orthodox        Admission Date Admission Type Admitting Provider Attending Provider Department, Room/Bed    6/11/20 Emergency Fortino Moreno MD Ahmed, Aftab, MD [address], P892/1    Discharge Date Discharge Disposition Discharge Destination                       Attending Provider:  Fortino Moreno MD    Allergies:  No Known Allergies    Isolation:  None   Infection:  None   Code Status:  No CPR    Ht:  167.6 cm (65.98\")   Wt:  75.8 kg (167 lb 1.7 oz)    Admission Cmt:  None   Principal Problem:  None                Active Insurance as of 6/11/2020     Primary Coverage     Payor Plan Insurance Group Employer/Plan Group    MEDICARE MEDICARE A & B      Payor Plan Address Payor Plan Phone Number Payor Plan Fax Number Effective Dates    [address] [phone]  5/1/1994 - None Entered    [facility] [id number]       Subscriber Name Subscriber Birth Date Member ID       CHRISTIANNE ORDONEZ 5/8/1929 [id number]           Secondary Coverage     Payor Plan Insurance Group Employer/Plan Group    [place]      Payor Plan Address Payor Plan Phone Number Payor Plan Fax Number Effective Dates    [address] [phone]  2/21/2019 - None Entered    [place] [address]       Subscriber Name Subscriber Birth Date Member ID       CHRISTIANNE ORDONEZ 5/8/1929 [id number]                 Emergency Contacts      (Rel.) Home Phone Work Phone Mobile Phone    Cindy Ordonez (Daughter) [phone] -- [phone]    MeryJimi (Son) [phone] -- [phone]    Lenora Montero (Grandchild) [phone] -- [phone]              "

## 2020-06-15 NOTE — THERAPY TREATMENT NOTE
Patient Name: Christianne Ordonez  : 1929    MRN: [id number]                              Today's Date: 6/15/2020       Admit Date: 2020    Visit Dx:     ICD-10-CM ICD-9-CM   1. Closed fracture of right hip, initial encounter (CMS/[facility]) S72.001A 820.8   2. Fall, initial encounter W19.XXXA E888.9     Patient Active Problem List   Diagnosis   • Acquired aphasia   • A-fib (CMS/[facility])   • Atherosclerotic cerebrovascular disease   • Apoplectic attack (CMS/[facility])   • Closed displaced transverse fracture of left patella   • Dementia without behavioral disturbance (CMS/[facility])   • Mixed hyperlipidemia   • Cerebrovascular accident (CVA) due to embolism of left middle cerebral artery (CMS/[facility])   • Closed fracture of right hip (CMS/[facility])     Past Medical History:   Diagnosis Date   • Aphasia    • Arthritis    • Atrial fibrillation (CMS/[facility])    • Cerebral atherosclerosis    • Depression    • Disease of thyroid gland    • Insomnia    • Stroke (CMS/[facility])      Past Surgical History:   Procedure Laterality Date   • KNEE SURGERY     • PATELLA OPEN REDUCTION INTERNAL FIXATION Left 2019    Procedure: PATELLA OPEN REDUCTION INTERNAL FIXATION;  Surgeon: Oleg David MD;  Location: [facility];  Service: Orthopedics     General Information     Row Name 06/15/20 1611          PT Evaluation Time/Intention    Document Type  therapy note (daily note) Pt. very groggy this PM and having difficulty keeping her eyes open throughout therapy session.   -MS     Mode of Treatment  physical therapy;individual therapy  -MS     Row Name 06/15/20 1611          General Information    Patient Profile Reviewed?  yes  -MS     Existing Precautions/Restrictions  (S) fall Exit alarm; Anterior THR precautions  -MS     Row Name 06/15/20 1611          Safety Issues, Functional Mobility    Comment, Safety Issues/Impairments (Mobility)  Gait belt used for safety.   -MS       User Key  (r) = Recorded By, (t) = Taken By, (c) = Cosigned By    Initials Name  Provider Type    Etienne Jasminehusam STEVENS, PT Physical Therapist        Mobility     Row Name 06/15/20 1612          Bed Mobility Assessment/Treatment    Supine-Sit [facility] (Bed Mobility)  maximum assist (25% patient effort);2 person assist  -MS     Sit-Supine [facility] [facility] (Bed Mobility)  maximum assist (25% patient effort);2 person assist  -MS     Assistive Device (Bed Mobility)  draw sheet  -MS     Comment (Bed Mobility)  Once sitting EOB, pt. requires Mod. assist x 1 for static sitting balance and Max. assist x 1 for dynamic sitting balance.  Posterior lean throughout.   -MS     Row Name 06/15/20 1612          Transfer Assessment/Treatment    Comment (Transfers)  Performed sit <-> stand transfers x 3 at bedside for functional strength training with bilateral feet/knees blocked for safety.  Pt. not able to achieve a full standing position with each attempt however despite max. verbal/tactile cueing and assist.   -MS     Row Name 06/15/20 1612          Sit-Stand Transfer    Sit-Stand [facility] (Transfers)  maximum assist (25% patient effort);2 person assist  -MS     Assistive Device (Sit-Stand Transfers)  -- HHA x 2 and use of gait belt  -MS     Row Name 06/15/20 1612          Mobility Assessment/Intervention    Extremity Weight-bearing Status  right lower extremity  -MS     Right Upper Extremity (Weight-bearing Status)  weight-bearing as tolerated (WBAT)  -MS       User Key  (r) = Recorded By, (t) = Taken By, (c) = Cosigned By    Initials Name Provider Type    MS NewbyAndrea PT Physical Therapist        Obj/Interventions     Row Name 06/15/20 1614          Therapeutic Exercise    Comment (Therapeutic Exercise)  Right THR ther. ex. program x 5 reps completed;  Assist with all.   -MS       User Key  (r) = Recorded By, (t) = Taken By, (c) = Cosigned By    Initials Name Provider Type    MS NewbyAndrea, PT Physical Therapist        Goals/Plan    No documentation.       Clinical Impression     Row Name  06/15/20 1615          Pain Assessment    Additional Documentation  Pain Scale: FACES Pre/Post-Treatment (Group)  -MS     Row Name 06/15/20 1615          Pain Scale: Numbers Pre/Post-Treatment    Pain Location - Side  Right  -MS     Pain Location  hip  -MS     Row Name 06/15/20 1615          Pain Scale: FACES Pre/Post-Treatment    Pain: FACES Scale, Pretreatment  2-->hurts little bit  -MS     Pain: FACES Scale, Post-Treatment  0-->no hurt  -MS     Row Name 06/15/20 1615          Positioning and Restraints    Pre-Treatment Position  in bed  -MS     Post Treatment Position  bed  -MS     In Bed  notified nsg;supine;call light within reach;encouraged to call for assist;exit alarm on;with family/caregiver;SCD pump applied All lines intact.   -MS       User Key  (r) = Recorded By, (t) = Taken By, (c) = Cosigned By    Initials Name Provider Type    Andrea Jasmine, PT Physical Therapist        Outcome Measures     Row Name 06/15/20 1616          How much help from another person do you currently need...    Turning from your back to your side while in flat bed without using bedrails?  2  -MS     Moving from lying on back to sitting on the side of a flat bed without bedrails?  1  -MS     Moving to and from a bed to a chair (including a wheelchair)?  1  -MS     Standing up from a chair using your arms (e.g., wheelchair, bedside chair)?  1  -MS     Climbing 3-5 steps with a railing?  1  -MS     To walk in hospital room?  1  -MS     AM-PAC 6 Clicks Score (PT)  7  -MS     Row Name 06/15/20 1616          Functional Assessment    Outcome Measure Options  AM-PAC 6 Clicks Basic Mobility (PT)  -MS       User Key  (r) = Recorded By, (t) = Taken By, (c) = Cosigned By    Initials Name Provider Type    Andrea Jasmine PT Physical Therapist        Physical Therapy Education                 Title: PT OT SLP Therapies (In Progress)     Topic: Physical Therapy (In Progress)     Point: Mobility training (In Progress)      Description:   Instruct learner(s) on safety and technique for assisting patient out of bed, chair or wheelchair.  Instruct in the proper use of assistive devices, such as walker, crutches, cane or brace.              Patient Friendly Description:   It's important to get you on your feet again, but we need to do so in a way that is safe for you. Falling has serious consequences, and your personal safety is the most important thing of all.        When it's time to get out of bed, one of us or a family member will sit next to you on the bed to give you support.     If your doctor or nurse tells you to use a walker, crutches, a cane, or a brace, be sure you use it every time you get out of bed, even if you think you don't need it.    Learning Progress Summary           Patient Acceptance, E,D, NR by MS at 6/15/2020 1616    Acceptance, E, NR by  at 6/14/2020 1310   Family Acceptance, E, NR by  at 6/14/2020 1310                   Point: Body mechanics (In Progress)     Description:   Instruct learner(s) on proper positioning and spine alignment for patient and/or caregiver during mobility tasks and/or exercises.              Learning Progress Summary           Patient Acceptance, E,D, NR by MS at 6/15/2020 1616    Acceptance, E, NR by  at 6/14/2020 1310   Family Acceptance, E, NR by  at 6/14/2020 1310                   Point: Precautions (In Progress)     Description:   Instruct learner(s) on prescribed precautions during mobility and gait tasks              Learning Progress Summary           Patient Acceptance, E,D, NR by MS at 6/15/2020 1616    Acceptance, E, NR by  at 6/14/2020 1310   Family Acceptance, E, NR by  at 6/14/2020 1310                               User Key     Initials Effective Dates Name Provider Type Discipline    MS 04/03/18 -  Andrea Newby, PT Physical Therapist PT     09/17/19 -  Rosario Dawkins, PT Physical Therapist PT              PT Recommendation and Plan     Plan of Care  Reviewed With: patient  Outcome Summary: Pt. requires Max. assist x 2 for bed mobility and Sit <-> stand transfers this date.  Once sitting EOB, pt. requires Mod. assist x 1 for static sitting balance and Max. assist x 1 for dynamic sitting balance (posterior lean throughout).  Pt. performed sit <-> stand transfers x 3 (bilateral feet/knees blocked) for functional strengthening but she was unable to achieve a full standing position with each attempt.  Right THR ther. ex. program completed with assist for general strengthening.     Time Calculation:   PT Charges     Row Name 06/15/20 1619             Time Calculation    Start Time  1512  -MS      Stop Time  1528  -MS      Time Calculation (min)  16 min  -MS      PT Received On  06/15/20  -MS      PT - Next Appointment  06/16/20  -MS         Time Calculation- PT    Total Timed Code Minutes- PT  14 minute(s)  -MS        User Key  (r) = Recorded By, (t) = Taken By, (c) = Cosigned By    Initials Name Provider Type    MS Andrea Newby, PT Physical Therapist        Therapy Charges for Today     Code Description Service Date Service Provider Modifiers Qty    31887078944 HC PT THER PROC EA 15 MIN 6/15/2020 Andrea Newby, PT GP 1    99348583647 HC PT THER SUPP EA 15 MIN 6/15/2020 Andrea Newby, PT GP 1          PT G-Codes  Outcome Measure Options: AM-PAC 6 Clicks Basic Mobility (PT)  AM-PAC 6 Clicks Score (PT): 7  AM-PAC 6 Clicks Score (OT): 9    Andrea Newby PT  6/15/2020

## 2020-06-15 NOTE — PROGRESS NOTES
"Daily progress note    Chief complaint  Doing better  No new complaints   Family at bedside    History of present illness  89-year-old white female with history of coronary artery disease status post CVA osteoarthritis depression hypothyroidism and gastroesophageal of disease brought to the emergency room after the fall with right hip pain.  According to family she lost her balance and fell.  Patient is on Eliquis for chronic atrial fibrillation.  No head injury and work-up in ER revealed right hip fracture admit for management.  At the time of interview she is awake and alert hurting at the right hip no other complaint.  Patient unable to give history most of the history obtained from the chart nursing staff old record and family.  Patient has no fever cough shortness of breath chest pain abdominal pain nausea vomiting diarrhea.     REVIEW OF SYSTEMS  Limited due to the patient's dementia     PHYSICAL EXAM   Blood pressure 130/76, pulse 86, temperature 98 °F (36.7 °C), temperature source Skin, resp. rate 16, height 167.6 cm (65.98\"), weight 75.8 kg (167 lb 1.7 oz), SpO2 91 %.    GENERAL: Uncomfortable, nontoxic  HENT: nares patent, scalp is atraumatic  EYES: no scleral icterus  CV: irregular rhythm, regular rate, 2+ right DP pulse  RESPIRATORY: normal effort, clear to auscultation bilaterally  ABDOMEN: soft, nontender  MUSCULOSKELETAL: Right leg is shortened and externally rotated with tenderness to the right greater trochanter, no pain outpatient of the upper extremities or left leg, no C-spine tenderness with full range of motion of the neck  NEURO: alert, moves all extremities, follows commands  SKIN: warm, dry     LAB RESULTS  Lab Results (last 24 hours)     Procedure Component Value Units Date/Time    Basic Metabolic Panel [[id number]]  (Abnormal) Collected:  06/15/20 0324    Specimen:  Blood Updated:  06/15/20 0414     Glucose 120 mg/dL      BUN 9 mg/dL      Creatinine 0.88 mg/dL      Sodium 142 mmol/L      " Potassium 4.0 mmol/L      Chloride 104 mmol/L      CO2 27.4 mmol/L      Calcium 8.0 mg/dL      eGFR Non African Amer 60 mL/min/1.73      BUN/Creatinine Ratio 10.2     Anion Gap 10.6 mmol/L     Narrative:       GFR Normal >60  Chronic Kidney Disease <60  Kidney Failure <15      CBC & Differential [[id number]] Collected:  06/15/20 0325    Specimen:  Blood Updated:  06/15/20 0349    Narrative:       The following orders were created for panel order CBC & Differential.  Procedure                               Abnormality         Status                     ---------                               -----------         ------                     CBC Auto Differential[[id number]]        Abnormal            Final result                 Please view results for these tests on the individual orders.    CBC Auto Differential [[id number]]  (Abnormal) Collected:  06/15/20 0325    Specimen:  Blood Updated:  06/15/20 0349     WBC 15.76 10*3/mm3      RBC 3.72 10*6/mm3      Hemoglobin 11.4 g/dL      Hematocrit 34.3 %      MCV 92.2 fL      MCH 30.6 pg      MCHC 33.2 g/dL      RDW 13.2 %      RDW-SD 44.8 fl      MPV 10.3 fL      Platelets 204 10*3/mm3      Neutrophil % 74.2 %      Lymphocyte % 14.0 %      Monocyte % 9.0 %      Eosinophil % 1.8 %      Basophil % 0.4 %      Immature Grans % 0.6 %      Neutrophils, Absolute 11.68 10*3/mm3      Lymphocytes, Absolute 2.21 10*3/mm3      Monocytes, Absolute 1.42 10*3/mm3      Eosinophils, Absolute 0.29 10*3/mm3      Basophils, Absolute 0.06 10*3/mm3      Immature Grans, Absolute 0.10 10*3/mm3      nRBC 0.1 /100 WBC         Imaging Results (Last 24 Hours)     ** No results found for the last 24 hours. **        ECG 12 Lead        HEART RATE= 67  bpm  RR Interval= 908  ms  WA Interval= 203  ms  P Horizontal Axis= 46  deg  P Front Axis= 7  deg  QRSD Interval= 93  ms  QT Interval= 419  ms  QRS Axis= -46  deg  T Wave Axis= 91  deg  - ABNORMAL ECG -  Sinus rhythm  Atrial premature complex  Left anterior  fascicular block  Low voltage, precordial leads  NO SIGNIFICANT CHANGE FROM PREVIOUS ECG             Current Facility-Administered Medications:   •  acetaminophen (TYLENOL) tablet 325 mg, 325 mg, Oral, Q4H PRN, Marcos Jean MD, 325 mg at 06/14/20 1038  •  apixaban (ELIQUIS) tablet 2.5 mg, 2.5 mg, Oral, BID, Marcos Jean MD, 2.5 mg at 06/15/20 0843  •  atorvastatin (LIPITOR) tablet 10 mg, 10 mg, Oral, Nightly, Marcos Jean MD, 10 mg at 06/14/20 2108  •  carvedilol (COREG) tablet 3.125 mg, 3.125 mg, Oral, BID With Meals, Marcos Jean MD, 3.125 mg at 06/15/20 0843  •  cefTRIAXone (ROCEPHIN) IVPB 1 g, 1 g, Intravenous, Q24H, Marcos Jean MD, Last Rate: 100 mL/hr at 06/14/20 1808, 1 g at 06/14/20 1808  •  docusate sodium (COLACE) capsule 100 mg, 100 mg, Oral, BID, Marcos Jean MD, 100 mg at 06/15/20 0844  •  donepezil (ARICEPT) tablet 10 mg, 10 mg, Oral, Nightly, Marcos Jean MD, 10 mg at 06/14/20 2108  •  doxepin (SINEquan) capsule 25 mg, 25 mg, Oral, Nightly, Marcos Jean MD, 25 mg at 06/14/20 2108  •  HYDROcodone-acetaminophen (NORCO) 7.5-325 MG per tablet 1 tablet, 1 tablet, Oral, Q4H PRN **OR** HYDROcodone-acetaminophen (NORCO) 7.5-325 MG per tablet 2 tablet, 2 tablet, Oral, Q4H PRN, Marcos Jean MD  •  levothyroxine (SYNTHROID, LEVOTHROID) tablet 75 mcg, 75 mcg, Oral, Q AM, Marcos Jean MD, 75 mcg at 06/15/20 0844  •  LORazepam (ATIVAN) tablet 0.5 mg, 0.5 mg, Oral, Q6H PRN, Marcos Jean MD  •  melatonin tablet 1 mg, 1 mg, Oral, Nightly PRN, Marcos Jean MD  •  [DISCONTINUED] ondansetron (ZOFRAN) tablet 4 mg, 4 mg, Oral, Q6H PRN **OR** ondansetron (ZOFRAN) injection 4 mg, 4 mg, Intravenous, Q6H PRN, Marcos Jean MD  •  pantoprazole (PROTONIX) EC tablet 40 mg, 40 mg, Oral, Q AM, Marcos Jean MD, 40 mg at 06/15/20 0844  •  PARoxetine (PAXIL) tablet 20 mg, 20 mg, Oral, Daily, Marcos Jean MD, 20 mg at 06/15/20 0843  •  polyethylene glycol 3350 powder (packet), 17 g, Oral, Daily, Miranda  MD Marcos, 17 g at 06/15/20 0843  •  QUEtiapine (SEROquel) tablet 25 mg, 25 mg, Oral, Nightly, Marcos Jean MD, 25 mg at 06/14/20 2424  •  [COMPLETED] Insert peripheral IV, , , Once **AND** sodium chloride 0.9 % flush 10 mL, 10 mL, Intravenous, PRN, Marcos Jean MD, 10 mL at 06/12/20 1403     ASSESSMENT  Acute right hip femoral neck fracture  Status post fall  Probable acute UTI  Chronic atrial fibrillation on Eliquis  Dementia  Depression  Hyperlipidemia  Hypertension  Hypothyroidism  Anxiety disorder  Osteoarthritis  Gastroesophageal reflux disease    PLAN  CPM  Postop care  Discontinue IV fluids  Change IV antibiotics to p.o. in a.m.  Pain management  Resume Eliquis  Orthopedic surgery consult appreciated  Continue home medications  Stress ulcer DVT prophylaxis  Supportive care  Discussed with family and nursing staff  DNR  PT/OT  Discharge to subacute rehab in a.m. if bed available and more stable    EMILY HERNANDEZ MD

## 2020-06-15 NOTE — THERAPY EVALUATION
Acute Care - Occupational Therapy Initial Evaluation  [facility]     Patient Name: Christianne Ordonez  : 1929  MRN: [id number]  Today's Date: 6/15/2020             Admit Date: 2020       ICD-10-CM ICD-9-CM   1. Closed fracture of right hip, initial encounter (CMS/[facility]) S72.001A 820.8   2. Fall, initial encounter W19.XXXA E888.9     Patient Active Problem List   Diagnosis   • Acquired aphasia   • A-fib (CMS/[facility])   • Atherosclerotic cerebrovascular disease   • Apoplectic attack (CMS/[facility])   • Closed displaced transverse fracture of left patella   • Dementia without behavioral disturbance (CMS/[facility])   • Mixed hyperlipidemia   • Cerebrovascular accident (CVA) due to embolism of left middle cerebral artery (CMS/[facility])   • Closed fracture of right hip (CMS/[facility])     Past Medical History:   Diagnosis Date   • Aphasia    • Arthritis    • Atrial fibrillation (CMS/[facility])    • Cerebral atherosclerosis    • Depression    • Disease of thyroid gland    • Insomnia    • Stroke (CMS/[facility])      Past Surgical History:   Procedure Laterality Date   • KNEE SURGERY     • PATELLA OPEN REDUCTION INTERNAL FIXATION Left 2019    Procedure: PATELLA OPEN REDUCTION INTERNAL FIXATION;  Surgeon: Oleg David MD;  Location: [facility];  Service: Orthopedics          OT ASSESSMENT FLOWSHEET (last 12 hours)      Occupational Therapy Evaluation     Row Name 06/15/20 0844                   OT Evaluation Time/Intention    Document Type  evaluation;therapy note (daily note)  -LE        Mode of Treatment  individual therapy;occupational therapy  -LE        Patient Effort  adequate  -LE        Comment  -- discuss pt with RN before and after  -LE           General Information    Patient Profile Reviewed?  yes  -LE        Patient/Family Observations  fowlers. aid helping/encouraging  pt eat. 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   -LE           Relationship/Environment    Lives With  alone  -LE           Cognitive Assessment/Intervention- PT/OT    Orientation Status (Cognition)  oriented to;person prompting to repeat name.  h/o expressive d/o.   -LE        Cognitive Assessment/Intervention Comment  --  tomás verbalizaton  -LE           Bed Mobility Assessment/Treatment    Bed Mobility Assessment/Treatment  supine-sit;sit-supine;scooting/bridging  -LE        Scooting/Bridging [place] (Bed Mobility)  dependent (less than 25% patient effort)  -LE        Supine-Sit [place] (Bed Mobility)  maximum assist (25% patient effort)  -LE        Sit-Supine [place] (Bed Mobility)  maximum assist (25% patient effort)  -LE        Bed Mobility, Safety Issues  decreased use of arms for pushing/pulling;decreased use of legs for bridging/pushing;cognitive deficits limit understanding;impaired trunk control for bed mobility  -LE        Assistive Device (Bed Mobility)  bed rails;draw sheet;head of bed elevated  -LE           Functional Mobility    Functional Mobility- Ind. Level  unable to perform;not tested  -LE           Sit-Stand Transfer    Assistive Device (Sit-Stand Transfers)  -- 3 attempt w/ & w/o walker, cognition limits sequence to try  -LE           ADL Assessment/Intervention    BADL Assessment/Intervention  lower body dressing;bathing;upper body dressing;grooming;feeding;toileting  -LE           Self-Feeding Assessment/Training    [place] Level (Feeding)  --  pt  condiments but not utensils. aid assisting pt  -LE        Position (Self-Feeding)  sitting up in bed  -LE           Toileting Assessment/Training     [place] Level (Toileting)  dependent (less than 25% patient effort) purwick and brief  -LE           General ROM    RT Upper Ext  Comments  -LE        LT Upper Ext  Comment  -LE        GENERAL ROM COMMENTS  -- arthritis changes finger B hands  -LE           Right Upper Ext    Rt Upper Extremity Comments   -- cold to touch, 1/2 AROM, h/o CVA  -LE           Left Upper Ext    Lt Upper Extremity Comments   -- 2/3 AROM  -LE           Static Sitting Balance    Level of [place] (Unsupported Sitting, Static Balance)  moderate assist, 50 to 74% patient effort improves with assist to min A/CGA  -LE        Sitting Position (Unsupported Sitting, Static Balance)  sitting on edge of bed  -LE        Time Able to Maintain Position (Unsupported Sitting, Static Balance)  1 to 2 minutes  -LE           Static Standing Balance    Level of [place] (Supported Standing, Static Balance)  unable to perform activity  -LE           Sensory Assessment/Intervention    Additional Documentation  -- cold R UE  -LE           Positioning and Restraints    Pre-Treatment Position  in bed  -LE        Post Treatment Position  bed  -LE        In Bed  notified nsg;fowlers;call light within reach;encouraged to call for assist;exit alarm on;SCD pump applied;RLE elevated  -LE           Pain Scale: Numbers Pre/Post-Treatment    Pain Location - Side  Right  -LE        Pain Location  hip grimace during bed mobility.  no state pain  -LE           Wound 06/13/20 0946 Right lateral;anterior hip Incision    Wound - Properties Group Date first assessed: 06/13/20  -AS Time first assessed: 0946  -AS Present on Hospital Admission: N  -AS Side: Right  -AS Orientation: lateral;anterior  -AS Location: hip  -AS Primary Wound Type: Incision  -AS Additional Comments: mepilex dressing  -AS       [REMOVED] Wound 05/27/19 0853 Left knee    Wound - Properties Group Date first assessed: 05/27/19  -AC Time first assessed: 0853  -AC Side: Left  -AC Location: knee  -AC  Resolution Date: 06/14/20  -TT Resolution Time: 2147 -TT Wound Outcome: Healed  -TT Type: incision  -AC       [REMOVED] Wound 05/27/19 1140 Right head    Wound - Properties Group Date first assessed: 05/27/19  -NL Time first assessed: 1140  -NL Side: Right  -NL Location: head  -NL Resolution Date: 06/14/20  -TT Resolution Time: 2147 -TT Wound Outcome: Healed  -TT Present On Admission : yes  -NL Type: abrasion  -NL       Coping    Observed Emotional State  calm;cooperative  -LE           Plan of Care Review    Plan of Care Reviewed With  patient  -LE        Outcome Summary  89 y.o. female admit from Home with s/p R anterior hip replacement .  Pt presents to OT with impaired balance, cognition, endurance, UE function and flexibility and is assist of one to move to EOB.  Cognition and pain limit attempts to stand at EOB .   Pt may benefit from skilled OT in acute care setting to increase safety and independence. Anticipate SNU at d/c.   -LE           Clinical Impression (OT)    OT Diagnosis  Need for assist with personal care.  Balance, UE function, Cognition, Mobility, education  -LE        Patient/Family Goals Statement (OT Eval)  doesn't state  -LE        Criteria for Skilled Therapeutic Interventions Met (OT Eval)  yes;treatment indicated  -LE        Rehab Potential (OT Eval)  good, to achieve stated therapy goals  -LE        Therapy Frequency (OT Eval)  5 times/wk  -LE        Care Plan Review (OT)  evaluation/treatment results reviewed;care plan/treatment goals reviewed  -LE        Anticipated Discharge Disposition (OT)  skilled nursing facility  -LE           Vital Signs    Pre SpO2 (%)  94  -LE        O2 Delivery Pre Treatment  supplemental O2  -LE        O2 Delivery Intra Treatment  supplemental O2  -LE        O2 Delivery Post Treatment  supplemental O2  -LE        Pre Patient Position  Supine  -LE        Intra Patient Position  Sitting  -LE        Post Patient Position  Supine  -LE           Planned OT  Interventions    Planned Therapy Interventions (OT Eval)  activity tolerance training;adaptive equipment training;BADL retraining;cognitive/visual perception retraining;functional balance retraining;patient/caregiver education/training;transfer/mobility retraining;ROM/therapeutic exercise  -LE           OT Goals    Transfer Goal Selection (OT)  transfer, OT goal 1  -LE        Dressing Goal Selection (OT)  dressing, OT goal 1  -LE        Toileting Goal Selection (OT)  toileting, OT goal 1  -LE        ROM Goal Selection (OT)  ROM, OT goal 1  -LE        Additional Documentation  ROM Goal Selection (OT) (Row)  -LE           Transfer Goal 1 (OT)    Activity/Assistive Device (Transfer Goal 1, OT)  sit-to-stand/stand-to-sit;bed-to-chair/chair-to-bed;toilet;walker, rolling;commode, bedside without drop arms  -LE        [place] Level/Cues Needed (Transfer Goal 1, OT)  maximum assist (25-49% patient effort)  -LE        Time Frame (Transfer Goal 1, OT)  2 weeks  -LE        Progress/Outcome (Transfer Goal 1, OT)  goal ongoing  -LE           Dressing Goal 1 (OT)    Activity/Assistive Device (Dressing Goal 1, OT)  upper body dressing  -LE        [place]/Cues Needed (Dressing Goal 1, OT)  supervision required;set-up required;tactile cues required;minimum assist (75% or more patient effort)  -LE        Time Frame (Dressing Goal 1, OT)  2 weeks  -LE        Progress/Outcome (Dressing Goal 1, OT)  goal ongoing  -LE           Toileting Goal 1 (OT)    Activity/Device (Toileting Goal 1, OT)  perform perineal hygiene;commode, bedside without drop arms;grab bar/safety frame  -LE        [place] Level/Cues Needed (Toileting Goal 1, OT)  moderate assist (50-74% patient effort)  -LE        Time Frame (Toileting Goal 1, OT)  2 weeks  -LE        Progress/Outcome (Toileting Goal 1, OT)  goal ongoing  -LE           ROM Goal 1 (OT)    ROM Goal 1 (OT)  AAROM R UE to increase reach and hold during ADL tasks.   -LE        Time Frame  (ROM Goal 1, OT)  2 weeks  -LE        Progress/Outcome (ROM Goal 1, OT)  goal ongoing  -LE          User Key  (r) = Recorded By, (t) = Taken By, (c) = Cosigned By    Initials Name Effective Dates    Janette Gutierrez OTR 06/08/18 -     Cristina Miller RN 06/16/16 -     Mi Ferrera RN 06/16/16 -     TT Clarisa Lujan RN 06/16/16 -     Sandra Toledo RN 06/16/16 -          Occupational Therapy Education                 Title: PT OT SLP Therapies (In Progress)     Topic: Occupational Therapy (In Progress)     Point: Precautions (In Progress)     Description:   Instruct learner(s) on prescribed precautions during self-care and functional transfers.              Learning Progress Summary           Patient Acceptance, E, NR by NAT at 6/15/2020 0853    Comment:  role of OT, body mechanics while sitting.  pt with confusion and expressive disorder and limits educatin                   Point: Body mechanics (In Progress)     Description:   Instruct learner(s) on proper positioning and spine alignment during self-care, functional mobility activities and/or exercises.              Learning Progress Summary           Patient Acceptance, E, NR by NAT at 6/15/2020 0853    Comment:  role of OT, body mechanics while sitting.  pt with confusion and expressive disorder and limits educatin                               User Key     Initials Effective Dates Name Provider Type Discipline     06/08/18 -  Janette Fairbanks OTR Occupational Therapist OT                  OT Recommendation and Plan  Outcome Summary/Treatment Plan (OT)  Anticipated Discharge Disposition (OT): skilled nursing facility  Planned Therapy Interventions (OT Eval): activity tolerance training, adaptive equipment training, BADL retraining, cognitive/visual perception retraining, functional balance retraining, patient/caregiver education/training, transfer/mobility retraining, ROM/therapeutic exercise  Therapy Frequency (OT Eval): 5 times/wk  Plan of  Care Review  Plan of Care Reviewed With: patient  Plan of Care Reviewed With: patient  Outcome Summary: 89 y.o. female admit from Home with s/p R anterior hip replacement .  Pt presents to OT with impaired balance, cognition, endurance, UE function and flexibility and is assist of one to move to EOB.  Cognition and pain limit attempts to stand at EOB .   Pt may benefit from skilled OT in acute care setting to increase safety and independence. Anticipate SNU at d/c.     Outcome Measures     Row Name 06/15/20 0800             How much help from another is currently needed...    Putting on and taking off regular lower body clothing?  1  -LE      Bathing (including washing, rinsing, and drying)  1  -LE      Toileting (which includes using toilet bed pan or urinal)  1  -LE      Putting on and taking off regular upper body clothing  2  -LE      Taking care of personal grooming (such as brushing teeth)  2  -LE      Eating meals  2  -LE      AM-PAC 6 Clicks Score (OT)  9  -LE         Functional Assessment    Outcome Measure Options  AM-PAC 6 Clicks Daily Activity (OT)  -LE        User Key  (r) = Recorded By, (t) = Taken By, (c) = Cosigned By    Initials Name Provider Type    Janette Gutierrez OTR Occupational Therapist          Time Calculation:   Time Calculation- OT     Row Name 06/15/20 0854             Time Calculation- OT    OT Start Time  0821  -LE      OT Stop Time  0839  -LE      OT Time Calculation (min)  18 min  -LE      Total Timed Code Minutes- OT  12 minute(s)  -LE      OT Received On  06/15/20  -LE      OT - Next Appointment  06/16/20  -LE      OT Goal Re-Cert Due Date  06/29/20  -LE        User Key  (r) = Recorded By, (t) = Taken By, (c) = Cosigned By    Initials Name Provider Type    Janette Gutierrez OTR Occupational Therapist        Therapy Charges for Today     Code Description Service Date Service Provider Modifiers Qty    [id number]  OT EVAL MOD COMPLEXITY 2 6/15/2020 Janette Fairbanks OTR GO 1     [id number]  OT THERAPEUTIC ACT EA 15 MIN 6/15/2020 Janette Fairbanks OTR GO 1               RAFAEL Restrepo  6/15/2020

## 2020-06-15 NOTE — PLAN OF CARE
Problem: Patient Care Overview  Goal: Plan of Care Review  Outcome: Ongoing (interventions implemented as appropriate)  Flowsheets  Taken 6/15/2020 0810  Plan of Care Reviewed With: patient  Taken 6/15/2020 0844  Outcome Summary: 89 y.o. female admit from Home with s/p R anterior hip replacement .  Pt presents to OT with impaired balance, cognition, endurance, UE function and flexibility and is assist of one to move to EOB.  Cognition and pain limit attempts to stand at EOB .   Pt may benefit from skilled OT in acute care setting to increase safety and independence. Anticipate SNU at d/c.        Patient was wearing a face mask during this therapy encounter. Therapist used appropriate personal protective equipment including surgical mask and gloves during the entire therapy session. Hand hygiene was completed before and after therapy session. Patient is not in enhanced droplet precautions.

## 2020-06-15 NOTE — PROGRESS NOTES
"Physicians Statement of Medical Necessity for  Ambulance Transportation    GENERAL INFORMATION     Name: Christianne Ordonez  YOB: 1929  Medicare #: see attached facesheet   Transport Date: 06/2020 (Valid for round trips this date, or for scheduled repetitive trips for 60 days from the date signed below.)  Origin:  8 [phone]  Destination: [facility]   Is the Patient's stay covered under Medicare Part A (PPS/DRG?)Yes   Closest appropriate facility? Yes  If this a hosp-hosp transfer? No  Is this a hospice patient? No    MEDICAL NECESSITY QUESTIONAIRE    Ambulance Transportation is medically necessary only if other means of transportation are contraindicated or would be potentially harmful to the patient.  To meet this requirement, the patient must be either \"bed confined\" or suffer from a condition such that transport by means other than an ambulance is contraindicated by the patient's condition.  The following questions must be answered by the healthcare professional signing below for this form to be valid:     1) Describe the MEDICAL CONDITION (physical and/or mental) of this patient AT THE TIME OF AMBULANCE TRANSPORT that requires the patient to be transported in an ambulance, and why transport by other means is contraindicated by the patient's condition:   Past Medical History:   Diagnosis Date   • Aphasia    • Arthritis    • Atrial fibrillation (CMS/HCC)    • Cerebral atherosclerosis    • Depression    • Disease of thyroid gland    • Insomnia    • Stroke (CMS/HCC)       Past Surgical History:   Procedure Laterality Date   • KNEE SURGERY     • PATELLA OPEN REDUCTION INTERNAL FIXATION Left 5/27/2019    Procedure: PATELLA OPEN REDUCTION INTERNAL FIXATION;  Surgeon: Oleg David MD;  Location: [facility];  Service: Orthopedics      2) Is this patient \"bed confined\" as defined below?Yes    To be \"bed confined\" the patient must satisfy all three of the following criteria:  (1) unable to get up from " bed without assistance; AND (2) unable to ambulate;  AND (3) unable to sit in a chair or wheelchair.  3) Can this patient safely be transported by car or wheelchair van (I.e., may safely sit during transport, without an attendant or monitoring?)No   4. In addition to completing questions 1-3 above, please check any of the following conditions that apply*:          *Note: supporting documentation for any boxes checked must be maintained in the patient's medical records Non-healed fractures, Moderate/severe pain on movement, Medical attendant required, Requires oxygen - unable to self administer, Unable to tolerate seated position for time needed to transport, Unable to sit in a chair or wheelchair due to decubitus ulcers or other wounds and Orthopedic device (backboard, halo, pins, traction, brace, wedge, etc.) required special handling during transport      SIGNATURE OF PHYSICIAN OR OTHER AUTHORIZED HEALTHCARE PROFESSIONAL    I certify that the above information is true and correct based on my evaluation of this patient, and represent that the patient requires transport by ambulance and that other forms of transport are contraindicated.  I understand that this information will be used by the Centers for Medicare and Medicaid Services (CMS) to support the determiniation of medical necessity for ambulance services, and I represent that I have personal knowledge of the patient's condition at the time of transport.       If this box is checked, I also certify that the patient is physically or mentally incapable of signing the ambulance service's claim form and that the institution with which I am affiliated has furnished care, services or assistance to the patient.  My signature below is made on behalf of the patient pursuant to 42 .36(b)(4). In accordance with 42 .37, the specific reason(s) that the patient is physically or mentally incapable of signing the claim for is as follows:    Signature of  Physician or Healthcare Professional  Date/Time:   06/2020     (For Scheduled repetitive transport, this form is not valid for transports performed more than 60 days after this date).                                                                                                                                            --------------------------------------------------------------------------------------------  Printed Name and Credentials of Physician or Authorized Healthcare Professional     *Form must be signed by patient's attending physician for scheduled, repetitive transports,.  For non-repetitive ambulance transports, if unable to obtain the signature of the attending physician, any of the following may sign (please select below):     Physician  Clinical Nurse Specialist  Registered Nurse     Physician Assistant  Discharge Planner  Licensed Practical Nurse     Nurse Practitioner

## 2020-06-15 NOTE — PLAN OF CARE
Problem: Patient Care Overview  Goal: Plan of Care Review  Flowsheets (Taken 6/15/2020 3841)  Plan of Care Reviewed With: patient  Outcome Summary: Pt. requires Max. assist x 2 for bed mobility and Sit <-> stand transfers this date.  Once sitting EOB, pt. requires Mod. assist x 1 for static sitting balance and Max. assist x 1 for dynamic sitting balance (posterior lean throughout).  Pt. performed sit <-> stand transfers x 3 (bilateral feet/knees blocked) for functional strengthening but she was unable to achieve a full standing position with each attempt.  Right THR ther. ex. program completed with assist for general strengthening.   Patient was wearing a face mask during this therapy encounter. Therapist used appropriate personal protective equipment including mask and gloves.  Mask used was standard procedure mask. Appropriate PPE was worn during the entire therapy session. Hand hygiene was completed before and after therapy session. Patient is not in enhanced droplet precautions.

## 2020-06-15 NOTE — PROGRESS NOTES
Continued Stay Note  [facility]     Patient Name: Christianne Ordonez  MRN: [id number]  Today's Date: 6/15/2020    Admit Date: 6/11/2020    Discharge Plan     Row Name 06/15/20 1538       Plan    Plan  [facility]     Plan Comments  [facility] bed available when pt is ready for dc. CCP to arrange transportation (likely will need ambulance).         Discharge Codes    No documentation.             Kathleen Munoz RN

## 2020-06-15 NOTE — PROGRESS NOTES
Discharge Planning Assessment  [facility]     Patient Name: Christianne Ordonez  MRN: [id number]  Today's Date: 6/15/2020    Admit Date: 6/11/2020    Discharge Needs Assessment     Row Name 06/15/20 1204       Living Environment    Lives With  child(cortez), adult    Current Living Arrangements  home/apartment/condo    Family Caregiver if Needed  child(cortez), adult    Quality of Family Relationships  helpful;involved;supportive       Transition Planning    Patient/Family Anticipates Transition to  inpatient rehabilitation facility    Patient/Family Anticipated Services at Transition  rehabilitation services;skilled nursing    Transportation Anticipated  family or friend will provide       Discharge Needs Assessment    Readmission Within the Last 30 Days  no previous admission in last 30 days    Concerns to be Addressed  adjustment to diagnosis/illness    Equipment Currently Used at Home  wheelchair;walker, rolling;cane, straight;hospital bed;shower chair    Discharge Facility/Level of Care Needs  nursing facility, skilled;rehabilitation facility        Discharge Plan     Row Name 06/15/20 1201       Plan    [facility]     Provided Post Acute Provider List?  N/A    Provided Post Acute Provider Quality & Resource List?  N/A    N/A Quality & Resource List Comment  referral to [facility] and [facility] per pt's request     Patient/Family in Agreement with Plan  yes    Plan Comments  Met w/ pt and pt's daughter at the bedside, facesheet verified. Pt lives w/ her son; her son and daughter are available to assist her 24/7 as needed. She uses a walker, wc, shower-chair and she has a hospital bed. There are six steps to enter the house once inside all ADL are on the main level.  [facility], [facility]. She would like [facility] for therapy after rehab. Epic referral sent to Mike Sanchez to jazmine and follow up.         Destination      Service Provider Request Status  Selected Services Address Phone Number Fax Number    [facility] Pending - Request Sent N/A [address] [place] [address] [phone]      Durable Medical Equipment      Coordination has not been started for this encounter.      Dialysis/Infusion      Coordination has not been started for this encounter.      Home Medical Care      Service Provider Request Status Selected Services Address Phone Number Fax Number    [facility] Pending - Request Sent N/A [address] [address] [phone]      Therapy      Coordination has not been started for this encounter.      Community Resources      Coordination has not been started for this encounter.          Demographic Summary    No documentation.       Functional Status     Row Name 06/15/20 1206       Functional Status, IADL    Medications  assistive person    Meal Preparation  assistive person    Housekeeping  assistive equipment and person    Laundry  assistive person    Shopping  assistive equipment and person       Mental Status    General Appearance WDL  WDL       Mental Status Summary    Recent Changes in Mental Status/Cognitive Functioning  unable to assess        Psychosocial    No documentation.       Abuse/Neglect    No documentation.       Legal    No documentation.       Substance Abuse    No documentation.       Patient Forms    No documentation.           Kathleen Munoz, RN

## 2020-06-16 LAB
ANION GAP SERPL CALCULATED.3IONS-SCNC: 8.9 MMOL/L (ref 5–15)
BASOPHILS # BLD AUTO: 0.09 10*3/MM3 (ref 0–0.2)
BASOPHILS NFR BLD AUTO: 0.6 % (ref 0–1.5)
BILIRUB UR QL STRIP: NEGATIVE
BUN BLD-MCNC: 12 MG/DL (ref 8–23)
BUN/CREAT SERPL: 16 (ref 7–25)
CALCIUM SPEC-SCNC: 8.3 MG/DL (ref 8.2–9.6)
CHLORIDE SERPL-SCNC: 101 MMOL/L (ref 98–107)
CLARITY UR: ABNORMAL
CO2 SERPL-SCNC: 29.1 MMOL/L (ref 22–29)
COLOR UR: YELLOW
CREAT BLD-MCNC: 0.75 MG/DL (ref 0.57–1)
DEPRECATED RDW RBC AUTO: 46.2 FL (ref 37–54)
EOSINOPHIL # BLD AUTO: 0.5 10*3/MM3 (ref 0–0.4)
EOSINOPHIL NFR BLD AUTO: 3.2 % (ref 0.3–6.2)
ERYTHROCYTE [DISTWIDTH] IN BLOOD BY AUTOMATED COUNT: 13.3 % (ref 12.3–15.4)
GFR SERPL CREATININE-BSD FRML MDRD: 72 ML/MIN/1.73
GLUCOSE BLD-MCNC: 108 MG/DL (ref 65–99)
GLUCOSE UR STRIP-MCNC: NEGATIVE MG/DL
HCT VFR BLD AUTO: 36.6 % (ref 34–46.6)
HGB BLD-MCNC: 12.1 G/DL (ref 12–15.9)
HGB UR QL STRIP.AUTO: NEGATIVE
IMM GRANULOCYTES # BLD AUTO: 0.08 10*3/MM3 (ref 0–0.05)
IMM GRANULOCYTES NFR BLD AUTO: 0.5 % (ref 0–0.5)
KETONES UR QL STRIP: NEGATIVE
LEUKOCYTE ESTERASE UR QL STRIP.AUTO: NEGATIVE
LYMPHOCYTES # BLD AUTO: 2.03 10*3/MM3 (ref 0.7–3.1)
LYMPHOCYTES NFR BLD AUTO: 12.8 % (ref 19.6–45.3)
MCH RBC QN AUTO: 30.8 PG (ref 26.6–33)
MCHC RBC AUTO-ENTMCNC: 33.1 G/DL (ref 31.5–35.7)
MCV RBC AUTO: 93.1 FL (ref 79–97)
MONOCYTES # BLD AUTO: 1.28 10*3/MM3 (ref 0.1–0.9)
MONOCYTES NFR BLD AUTO: 8.1 % (ref 5–12)
NEUTROPHILS # BLD AUTO: 11.82 10*3/MM3 (ref 1.7–7)
NEUTROPHILS NFR BLD AUTO: 74.8 % (ref 42.7–76)
NITRITE UR QL STRIP: NEGATIVE
NRBC BLD AUTO-RTO: 0 /100 WBC (ref 0–0.2)
PH UR STRIP.AUTO: <=5 [PH] (ref 5–8)
PLATELET # BLD AUTO: 233 10*3/MM3 (ref 140–450)
PMV BLD AUTO: 10.6 FL (ref 6–12)
POTASSIUM BLD-SCNC: 4 MMOL/L (ref 3.5–5.2)
PROT UR QL STRIP: NEGATIVE
RBC # BLD AUTO: 3.93 10*6/MM3 (ref 3.77–5.28)
SODIUM BLD-SCNC: 139 MMOL/L (ref 136–145)
SP GR UR STRIP: 1.02 (ref 1–1.03)
UROBILINOGEN UR QL STRIP: ABNORMAL
WBC NRBC COR # BLD: 15.8 10*3/MM3 (ref 3.4–10.8)

## 2020-06-16 PROCEDURE — 80048 BASIC METABOLIC PNL TOTAL CA: CPT | Performed by: HOSPITALIST

## 2020-06-16 PROCEDURE — 85025 COMPLETE CBC W/AUTO DIFF WBC: CPT | Performed by: HOSPITALIST

## 2020-06-16 PROCEDURE — 97110 THERAPEUTIC EXERCISES: CPT

## 2020-06-16 PROCEDURE — 97530 THERAPEUTIC ACTIVITIES: CPT

## 2020-06-16 PROCEDURE — 81003 URINALYSIS AUTO W/O SCOPE: CPT | Performed by: HOSPITALIST

## 2020-06-16 RX ORDER — CEPHALEXIN 250 MG/1
250 CAPSULE ORAL EVERY 8 HOURS
Status: DISCONTINUED | OUTPATIENT
Start: 2020-06-16 | End: 2020-06-18 | Stop reason: HOSPADM

## 2020-06-16 RX ADMIN — DOCUSATE SODIUM 100 MG: 100 CAPSULE, LIQUID FILLED ORAL at 08:37

## 2020-06-16 RX ADMIN — DOXEPIN HYDROCHLORIDE 25 MG: 25 CAPSULE ORAL at 23:23

## 2020-06-16 RX ADMIN — DOCUSATE SODIUM 100 MG: 100 CAPSULE, LIQUID FILLED ORAL at 23:23

## 2020-06-16 RX ADMIN — QUETIAPINE FUMARATE 25 MG: 25 TABLET ORAL at 23:23

## 2020-06-16 RX ADMIN — CARVEDILOL 3.12 MG: 3.12 TABLET, FILM COATED ORAL at 18:09

## 2020-06-16 RX ADMIN — LEVOTHYROXINE SODIUM 75 MCG: 75 TABLET ORAL at 05:26

## 2020-06-16 RX ADMIN — APIXABAN 2.5 MG: 2.5 TABLET, FILM COATED ORAL at 23:23

## 2020-06-16 RX ADMIN — PANTOPRAZOLE SODIUM 40 MG: 40 TABLET, DELAYED RELEASE ORAL at 05:26

## 2020-06-16 RX ADMIN — ATORVASTATIN CALCIUM 10 MG: 20 TABLET, FILM COATED ORAL at 23:23

## 2020-06-16 RX ADMIN — DONEPEZIL HYDROCHLORIDE 10 MG: 10 TABLET, FILM COATED ORAL at 23:22

## 2020-06-16 RX ADMIN — APIXABAN 2.5 MG: 2.5 TABLET, FILM COATED ORAL at 08:37

## 2020-06-16 RX ADMIN — POLYETHYLENE GLYCOL 3350 17 G: 17 POWDER, FOR SOLUTION ORAL at 08:37

## 2020-06-16 RX ADMIN — PAROXETINE HYDROCHLORIDE HEMIHYDRATE 20 MG: 20 TABLET, FILM COATED ORAL at 08:37

## 2020-06-16 RX ADMIN — CEPHALEXIN 250 MG: 250 CAPSULE ORAL at 18:09

## 2020-06-16 RX ADMIN — CARVEDILOL 3.12 MG: 3.12 TABLET, FILM COATED ORAL at 08:37

## 2020-06-16 NOTE — PROGRESS NOTES
"Daily progress note    Chief complaint  Doing better  No new complaints   Family at bedside    History of present illness  89-year-old white female with history of coronary artery disease status post CVA osteoarthritis depression hypothyroidism and gastroesophageal of disease brought to the emergency room after the fall with right hip pain.  According to family she lost her balance and fell.  Patient is on Eliquis for chronic atrial fibrillation.  No head injury and work-up in ER revealed right hip fracture admit for management.  At the time of interview she is awake and alert hurting at the right hip no other complaint.  Patient unable to give history most of the history obtained from the chart nursing staff old record and family.  Patient has no fever cough shortness of breath chest pain abdominal pain nausea vomiting diarrhea.     REVIEW OF SYSTEMS  Limited due to the patient's dementia     PHYSICAL EXAM   Blood pressure 120/68, pulse 83, temperature 97 °F (36.1 °C), temperature source Temporal, resp. rate 16, height 167.6 cm (65.98\"), weight 75.8 kg (167 lb 1.7 oz), SpO2 96 %.    GENERAL: Uncomfortable, nontoxic  HENT: nares patent, scalp is atraumatic  EYES: no scleral icterus  CV: irregular rhythm, regular rate, 2+ right DP pulse  RESPIRATORY: normal effort, clear to auscultation bilaterally  ABDOMEN: soft, nontender  MUSCULOSKELETAL: Right leg is shortened and externally rotated with tenderness to the right greater trochanter, no pain outpatient of the upper extremities or left leg, no C-spine tenderness with full range of motion of the neck  NEURO: alert, moves all extremities, follows commands  SKIN: warm, dry     LAB RESULTS  Lab Results (last 24 hours)     Procedure Component Value Units Date/Time    Basic Metabolic Panel [[id number]]  (Abnormal) Collected:  06/16/20 0343    Specimen:  Blood Updated:  06/16/20 0442     Glucose 108 mg/dL      BUN 12 mg/dL      Creatinine 0.75 mg/dL      Sodium 139 mmol/L    "     Potassium 4.0 mmol/L      Chloride 101 mmol/L      CO2 29.1 mmol/L      Calcium 8.3 mg/dL      eGFR Non African Amer 72 mL/min/1.73      BUN/Creatinine Ratio 16.0     Anion Gap 8.9 mmol/L     Narrative:       GFR Normal >60  Chronic Kidney Disease <60  Kidney Failure <15      CBC & Differential [[id number]] Collected:  06/16/20 0344    Specimen:  Blood Updated:  06/16/20 0417    Narrative:       The following orders were created for panel order CBC & Differential.  Procedure                               Abnormality         Status                     ---------                               -----------         ------                     CBC Auto Differential[[id number]]        Abnormal            Final result                 Please view results for these tests on the individual orders.    CBC Auto Differential [[id number]]  (Abnormal) Collected:  06/16/20 0344    Specimen:  Blood Updated:  06/16/20 0417     WBC 15.80 10*3/mm3      RBC 3.93 10*6/mm3      Hemoglobin 12.1 g/dL      Hematocrit 36.6 %      MCV 93.1 fL      MCH 30.8 pg      MCHC 33.1 g/dL      RDW 13.3 %      RDW-SD 46.2 fl      MPV 10.6 fL      Platelets 233 10*3/mm3      Neutrophil % 74.8 %      Lymphocyte % 12.8 %      Monocyte % 8.1 %      Eosinophil % 3.2 %      Basophil % 0.6 %      Immature Grans % 0.5 %      Neutrophils, Absolute 11.82 10*3/mm3      Lymphocytes, Absolute 2.03 10*3/mm3      Monocytes, Absolute 1.28 10*3/mm3      Eosinophils, Absolute 0.50 10*3/mm3      Basophils, Absolute 0.09 10*3/mm3      Immature Grans, Absolute 0.08 10*3/mm3      nRBC 0.0 /100 WBC     POC Glucose Once [[id number]]  (Normal) Collected:  06/15/20 1813    Specimen:  Blood Updated:  06/15/20 1814     Glucose 107 mg/dL         Imaging Results (Last 24 Hours)     ** No results found for the last 24 hours. **        ECG 12 Lead        HEART RATE= 67  bpm  RR Interval= 908  ms  OK Interval= 203  ms  P Horizontal Axis= 46  deg  P Front Axis= 7  deg  QRSD Interval= 93   ms  QT Interval= 419  ms  QRS Axis= -46  deg  T Wave Axis= 91  deg  - ABNORMAL ECG -  Sinus rhythm  Atrial premature complex  Left anterior fascicular block  Low voltage, precordial leads  NO SIGNIFICANT CHANGE FROM PREVIOUS ECG             Current Facility-Administered Medications:   •  acetaminophen (TYLENOL) tablet 325 mg, 325 mg, Oral, Q4H PRN, Marcos Jean MD, 325 mg at 06/14/20 1038  •  apixaban (ELIQUIS) tablet 2.5 mg, 2.5 mg, Oral, BID, Marcos Jean MD, 2.5 mg at 06/16/20 0837  •  atorvastatin (LIPITOR) tablet 10 mg, 10 mg, Oral, Nightly, Marcos Jean MD, 10 mg at 06/15/20 2051  •  carvedilol (COREG) tablet 3.125 mg, 3.125 mg, Oral, BID With Meals, Marcos Jean MD, 3.125 mg at 06/16/20 0837  •  cefTRIAXone (ROCEPHIN) IVPB 1 g, 1 g, Intravenous, Q24H, Marcos Jean MD, Last Rate: 100 mL/hr at 06/15/20 1849, 1 g at 06/15/20 1849  •  docusate sodium (COLACE) capsule 100 mg, 100 mg, Oral, BID, Marcos Jean MD, 100 mg at 06/16/20 0837  •  donepezil (ARICEPT) tablet 10 mg, 10 mg, Oral, Nightly, Marcos Jean MD, 10 mg at 06/15/20 2051  •  doxepin (SINEquan) capsule 25 mg, 25 mg, Oral, Nightly, Marcos Jean MD, 25 mg at 06/15/20 2051  •  HYDROcodone-acetaminophen (NORCO) 7.5-325 MG per tablet 1 tablet, 1 tablet, Oral, Q4H PRN **OR** [DISCONTINUED] HYDROcodone-acetaminophen (NORCO) 7.5-325 MG per tablet 2 tablet, 2 tablet, Oral, Q4H PRN, Marcos Jean MD  •  levothyroxine (SYNTHROID, LEVOTHROID) tablet 75 mcg, 75 mcg, Oral, Q AM, Marcos Jean MD, 75 mcg at 06/16/20 0526  •  LORazepam (ATIVAN) tablet 0.5 mg, 0.5 mg, Oral, Q6H PRN, Marcos Jean MD  •  melatonin tablet 1 mg, 1 mg, Oral, Nightly PRN, Marcos Jean MD  •  [DISCONTINUED] ondansetron (ZOFRAN) tablet 4 mg, 4 mg, Oral, Q6H PRN **OR** ondansetron (ZOFRAN) injection 4 mg, 4 mg, Intravenous, Q6H PRN, Marcos Jean MD  •  pantoprazole (PROTONIX) EC tablet 40 mg, 40 mg, Oral, Q AM, Marcos Jean MD, 40 mg at 06/16/20 0526  •  PARoxetine  (PAXIL) tablet 20 mg, 20 mg, Oral, Daily, Marcos Jean MD, 20 mg at 06/16/20 0837  •  polyethylene glycol 3350 powder (packet), 17 g, Oral, Daily, Marcos Jean MD, 17 g at 06/16/20 0837  •  QUEtiapine (SEROquel) tablet 25 mg, 25 mg, Oral, Nightly, Marcos Jean MD, 25 mg at 06/15/20 2051  •  [COMPLETED] Insert peripheral IV, , , Once **AND** sodium chloride 0.9 % flush 10 mL, 10 mL, Intravenous, PRN, Marcos Jean MD, 10 mL at 06/12/20 1403     ASSESSMENT  Acute right hip femoral neck fracture  Status post fall  Probable acute UTI  Chronic atrial fibrillation on Eliquis  Dementia  Depression  Hyperlipidemia  Hypertension  Hypothyroidism  Anxiety disorder  Osteoarthritis  Gastroesophageal reflux disease    PLAN  CPM  Postop care  Discontinue IV fluids  Change IV antibiotics to p.o. in a.m.  Pain management  Resume Eliquis  Orthopedic surgery consult appreciated  Continue home medications  Stress ulcer DVT prophylaxis  Supportive care  Discussed with family and nursing staff  DNR  PT/OT  Discharge to subacute rehab in a.m.     EMILY HERNANDEZ MD

## 2020-06-16 NOTE — PLAN OF CARE
Problem: Patient Care Overview  Goal: Plan of Care Review  Outcome: Ongoing (interventions implemented as appropriate)  Flowsheets (Taken 6/16/2020 8642)  Progress: improving  Plan of Care Reviewed With: patient  Outcome Summary: Pt tolerated treatment fair this date. Able to stand 4x total, requiring max A x2 and several cues for placement of hands, feet and overall posture. No success on taking steps in any direction. Instructed pt and pt's daughter on supine exercises to perform during the evening. Patient was wearing a face mask during this therapy encounter. Therapist used appropriate personal protective equipment including mask and gloves. Mask used was standard procedure mask. Appropriate PPE was worn during the entire therapy session. Hand hygiene was completed before and after therapy session. Patient is not in enhanced droplet precautions.

## 2020-06-16 NOTE — THERAPY TREATMENT NOTE
Patient Name: Christianne Ordonez  : 1929    MRN: [id number]                              Today's Date: 2020       Admit Date: 2020    Visit Dx:     ICD-10-CM ICD-9-CM   1. Closed fracture of right hip, initial encounter (CMS/[facility]) S72.001A 820.8   2. 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[facility])   • Closed displaced transverse fracture of left patella   • Dementia without behavioral disturbance (CMS/[facility])   • Mixed hyperlipidemia   • Cerebrovascular accident (CVA) due to embolism of left middle cerebral artery (CMS/[facility])   • Closed fracture of right hip (CMS/[facility])     Past Medical History:   Diagnosis Date   • Aphasia    • Arthritis    • Atrial fibrillation (CMS/[facility])    • Cerebral atherosclerosis    • Depression    • Disease of thyroid gland    • Insomnia    • Stroke (CMS/[facility])      Past Surgical History:   Procedure Laterality Date   • KNEE SURGERY     • PATELLA OPEN REDUCTION INTERNAL FIXATION Left 2019    Procedure: PATELLA OPEN REDUCTION INTERNAL FIXATION;  Surgeon: Oleg David MD;  Location: [facility];  Service: Orthopedics     General Information     Row Name 20 1540          PT Evaluation Time/Intention    Document Type  therapy note (daily note)  -     Mode of Treatment  physical therapy  -     Row Name 20 154          General Information    Existing Precautions/Restrictions  fall;oxygen therapy device and L/min  -     Row Name 20 1542          Cognitive Assessment/Intervention- PT/OT    Orientation Status (Cognition)  oriented x 3  -       User Key  (r) = Recorded By, (t) = Taken By, (c) = Cosigned By    Initials Name Provider Type     Mihaela Hall PTA Physical Therapy Assistant        Mobility     Row Name 20 154          Bed Mobility Assessment/Treatment    Bed Mobility  Assessment/Treatment  supine-sit;sit-supine  -     Supine-Sit [place] (Bed Mobility)  maximum assist (25% patient effort);2 person assist  -     Sit-Supine [place] (Bed Mobility)  maximum assist (25% patient effort);2 person assist  -     Assistive Device (Bed Mobility)  bed rails;draw sheet;head of bed elevated  -SM     Row Name 06/16/20 1543          Transfer Assessment/Treatment    Comment (Transfers)  stood 4x  -SM     Row Name 06/16/20 1543          Sit-Stand Transfer    Sit-Stand [place] (Transfers)  maximum assist (25% patient effort);2 person assist  -     Assistive Device (Sit-Stand Transfers)  walker, front-wheeled  -SM     Row Name 06/16/20 1543          Gait/Stairs Assessment/Training    Comment (Gait/Stairs)  unable to take side steps or transfer to BSC or chair  -       User Key  (r) = Recorded By, (t) = Taken By, (c) = Cosigned By    Initials Name Provider Type    Mihaela Aguilar PTA Physical Therapy Assistant        Obj/Interventions     Row Name 06/16/20 1545          Therapeutic Exercise    Lower Extremity (Therapeutic Exercise)  gluteal sets;LAQ (long arc quad), bilateral;quad sets, bilateral  -     Lower Extremity Range of Motion (Therapeutic Exercise)  ankle dorsiflexion/plantar flexion, bilateral  -     Position (Therapeutic Exercise)  seated;supine  -SM     Sets/Reps (Therapeutic Exercise)  5 reps  -       User Key  (r) = Recorded By, (t) = Taken By, (c) = Cosigned By    Initials Name Provider Type     Mihaela Hall PTA Physical Therapy Assistant        Goals/Plan    No documentation.       Clinical Impression     Row Name 06/16/20 1545          Pain Assessment    Additional Documentation  Pain Scale: Numbers Pre/Post-Treatment (Group)  -SM     Row Name 06/16/20 1545          Pain Scale: Numbers Pre/Post-Treatment    Pain Scale: Numbers, Pretreatment  0/10 - no pain  -     Pain Scale: Numbers, Post-Treatment  2/10  -     Pain Location - Side   Right  -SM     Pain Location  hip  -SM     Pain Intervention(s)  Repositioned;Ambulation/increased activity;Rest  -     Row Name 06/16/20 1545          Positioning and Restraints    Pre-Treatment Position  in bed  -SM     Post Treatment Position  bed  -SM     In Bed  supine;call light within reach;encouraged to call for assist;exit alarm on;with family/caregiver  -       User Key  (r) = Recorded By, (t) = Taken By, (c) = Cosigned By    Initials Name Provider Type    Mihaela Aguilar PTA Physical Therapy Assistant        Outcome Measures     Row Name 06/16/20 1546          How much help from another person do you currently need...    Turning from your back to your side while in flat bed without using bedrails?  3  -SM     Moving from lying on back to sitting on the side of a flat bed without bedrails?  2  -SM     Moving to and from a bed to a chair (including a wheelchair)?  2  -SM     Standing up from a chair using your arms (e.g., wheelchair, bedside chair)?  2  -SM     Climbing 3-5 steps with a railing?  1  -SM     To walk in hospital room?  1  -SM     AM-PAC 6 Clicks Score (PT)  11  -     Row Name 06/16/20 1546          Functional Assessment    Outcome Measure Options  AM-PAC 6 Clicks Basic Mobility (PT)  -       User Key  (r) = Recorded By, (t) = Taken By, (c) = Cosigned By    Initials Name Provider Type    Mihaela Aguilar PTA Physical Therapy Assistant        Physical Therapy Education                 Title: PT OT SLP Therapies (In Progress)     Topic: Physical Therapy (In Progress)     Point: Mobility training (In Progress)     Description:   Instruct learner(s) on safety and technique for assisting patient out of bed, chair or wheelchair.  Instruct in the proper use of assistive devices, such as walker, crutches, cane or brace.              Patient Friendly Description:   It's important to get you on your feet again, but we need to do so in a way that is safe for you. Falling has  serious consequences, and your personal safety is the most important thing of all.        When it's time to get out of bed, one of us or a family member will sit next to you on the bed to give you support.     If your doctor or nurse tells you to use a walker, crutches, a cane, or a brace, be sure you use it every time you get out of bed, even if you think you don't need it.    Learning Progress Summary           Patient Acceptance, E,TB,D, VU,NR by  at 6/16/2020 1547    Acceptance, E,D, NR by MS at 6/15/2020 1616    Acceptance, E, NR by  at 6/14/2020 1310   Family Acceptance, E, NR by  at 6/14/2020 1310                   Point: Body mechanics (In Progress)     Description:   Instruct learner(s) on proper positioning and spine alignment for patient and/or caregiver during mobility tasks and/or exercises.              Learning Progress Summary           Patient Acceptance, E,TB,D, VU,NR by  at 6/16/2020 1547    Acceptance, E,D, NR by MS at 6/15/2020 1616    Acceptance, E, NR by  at 6/14/2020 1310   Family Acceptance, E, NR by  at 6/14/2020 1310                   Point: Precautions (In Progress)     Description:   Instruct learner(s) on prescribed precautions during mobility and gait tasks              Learning Progress Summary           Patient Acceptance, E,TB,D, VU,NR by  at 6/16/2020 1547    Acceptance, E,D, NR by MS at 6/15/2020 1616    Acceptance, E, NR by  at 6/14/2020 1310   Family Acceptance, E, NR by  at 6/14/2020 1310                               User Key     Initials Effective Dates Name Provider Type Discipline    MS 04/03/18 -  Andrea Newby, PT Physical Therapist PT     03/07/18 -  Mihaela Hall, PTA Physical Therapy Assistant PT     09/17/19 -  Rosario Dawkins, JULIUS Physical Therapist PT              PT Recommendation and Plan     Outcome Summary/Treatment Plan (PT)  Anticipated Discharge Disposition (PT): skilled nursing facility  Plan of Care Reviewed With:  patient  Progress: improving  Outcome Summary: Pt tolerated treatment fair this date. Able to stand 4x total, requiring max A x2 and several cues for placement of hands, feet and overall posture. No success on taking steps in any direction. Instructed pt and pt's daughter on supine exercises to perform during the evening. Patient was wearing a face mask during this therapy encounter. Therapist used appropriate personal protective equipment including mask and gloves. Mask used was standard procedure mask. Appropriate PPE was worn during the entire therapy session. Hand hygiene was completed before and after therapy session. Patient is not in enhanced droplet precautions.     Time Calculation:   PT Charges     Row Name 06/16/20 1549             Time Calculation    Start Time  1442  -SM      Stop Time  1510  -SM      Time Calculation (min)  28 min  -SM      PT Received On  06/16/20  -      PT - Next Appointment  06/17/20  -        User Key  (r) = Recorded By, (t) = Taken By, (c) = Cosigned By    Initials Name Provider Type    Mihaela Aguilar PTA Physical Therapy Assistant        Therapy Charges for Today     Code Description Service Date Service Provider Modifiers Qty    09581064240 HC PT THER PROC EA 15 MIN 6/16/2020 Mihaela Hall PTA GP 1    83918595993 HC PT THER SUPP EA 15 MIN 6/16/2020 Mihaela Hall PTA GP 2    01571043907 HC PT THERAPEUTIC ACT EA 15 MIN 6/16/2020 Mihaela Hall PTA GP 1          PT G-Codes  Outcome Measure Options: AM-PAC 6 Clicks Basic Mobility (PT)  AM-PAC 6 Clicks Score (PT): 11  AM-PAC 6 Clicks Score (OT): 9    Mihaela Hall PTA  6/16/2020

## 2020-06-16 NOTE — PLAN OF CARE
Problem: Patient Care Overview  Goal: Plan of Care Review  Outcome: Ongoing (interventions implemented as appropriate)  Flowsheets  Taken 6/16/2020 1709 by Eli Torres, RN  Progress: improving  Outcome Summary: pod x3 of Left bipolar hip arthroplasty. vss. dressing cdi. hx of dementia and stroke with aphasia. a&ox2. unable to ambulate with assistance today with PT. pain well controlled and has denied pain throughout shift. q2turn. and is voiding per PW. unable to educate d/t confusion. plan to dc to SNF when able to ambulate better.  Taken 6/16/2020 1547 by Mihaela Hall PTA  Plan of Care Reviewed With: patient

## 2020-06-16 NOTE — PROGRESS NOTES
Continued Stay Note  [facility]     Patient Name: Christianne Ordonez  MRN: [id number]  Today's Date: 6/16/2020    Admit Date: 6/11/2020    Discharge Plan     Row Name 06/16/20 1438       Plan    Plan  [facility] via [facility] on Wed 5/17@ 1500    Plan Comments  Pt to dc to [facility] via Ambulance; [facility] will have a bed at [facility] per Laura. [facility]ulance scheduled w/ Abby for transport to [facility] on Wed 6/17 @ 1500 (pending dc). Confirmed dc plan and transportation w/ pt's son Jimi [phone]. Transfer packet and ambulance form in [facility].         Discharge Codes    No documentation.             Kathleen Munoz RN

## 2020-06-16 NOTE — PLAN OF CARE
Problem: Patient Care Overview  Goal: Plan of Care Review  Outcome: Ongoing (interventions implemented as appropriate)  Flowsheets (Taken 6/16/2020 0315)  Progress: improving  Plan of Care Reviewed With: patient  Outcome Summary: pts. A-fib treated with meds. no verbal or signs or symptoms of pain this shift.     Problem: Fall Risk (Adult)  Goal: Absence of Fall  Outcome: Ongoing (interventions implemented as appropriate)  Flowsheets (Taken 6/16/2020 0315)  Absence of Fall: making progress toward outcome

## 2020-06-17 LAB
ANION GAP SERPL CALCULATED.3IONS-SCNC: 8.4 MMOL/L (ref 5–15)
BASOPHILS # BLD AUTO: 0.07 10*3/MM3 (ref 0–0.2)
BASOPHILS NFR BLD AUTO: 0.5 % (ref 0–1.5)
BUN BLD-MCNC: 13 MG/DL (ref 8–23)
BUN/CREAT SERPL: 15.3 (ref 7–25)
CALCIUM SPEC-SCNC: 8.4 MG/DL (ref 8.2–9.6)
CHLORIDE SERPL-SCNC: 100 MMOL/L (ref 98–107)
CO2 SERPL-SCNC: 29.6 MMOL/L (ref 22–29)
CREAT BLD-MCNC: 0.85 MG/DL (ref 0.57–1)
DEPRECATED RDW RBC AUTO: 43.6 FL (ref 37–54)
EOSINOPHIL # BLD AUTO: 0.48 10*3/MM3 (ref 0–0.4)
EOSINOPHIL NFR BLD AUTO: 3.5 % (ref 0.3–6.2)
ERYTHROCYTE [DISTWIDTH] IN BLOOD BY AUTOMATED COUNT: 13.1 % (ref 12.3–15.4)
GFR SERPL CREATININE-BSD FRML MDRD: 63 ML/MIN/1.73
GLUCOSE BLD-MCNC: 117 MG/DL (ref 65–99)
HCT VFR BLD AUTO: 35.3 % (ref 34–46.6)
HGB BLD-MCNC: 11.7 G/DL (ref 12–15.9)
IMM GRANULOCYTES # BLD AUTO: 0.1 10*3/MM3 (ref 0–0.05)
IMM GRANULOCYTES NFR BLD AUTO: 0.7 % (ref 0–0.5)
LYMPHOCYTES # BLD AUTO: 1.99 10*3/MM3 (ref 0.7–3.1)
LYMPHOCYTES NFR BLD AUTO: 14.3 % (ref 19.6–45.3)
MCH RBC QN AUTO: 30.5 PG (ref 26.6–33)
MCHC RBC AUTO-ENTMCNC: 33.1 G/DL (ref 31.5–35.7)
MCV RBC AUTO: 92.2 FL (ref 79–97)
MONOCYTES # BLD AUTO: 1.01 10*3/MM3 (ref 0.1–0.9)
MONOCYTES NFR BLD AUTO: 7.3 % (ref 5–12)
NEUTROPHILS # BLD AUTO: 10.22 10*3/MM3 (ref 1.7–7)
NEUTROPHILS NFR BLD AUTO: 73.7 % (ref 42.7–76)
NRBC BLD AUTO-RTO: 0 /100 WBC (ref 0–0.2)
PLATELET # BLD AUTO: 268 10*3/MM3 (ref 140–450)
PMV BLD AUTO: 9.9 FL (ref 6–12)
POTASSIUM BLD-SCNC: 3.9 MMOL/L (ref 3.5–5.2)
RBC # BLD AUTO: 3.83 10*6/MM3 (ref 3.77–5.28)
SODIUM BLD-SCNC: 138 MMOL/L (ref 136–145)
WBC NRBC COR # BLD: 13.87 10*3/MM3 (ref 3.4–10.8)

## 2020-06-17 PROCEDURE — 85025 COMPLETE CBC W/AUTO DIFF WBC: CPT | Performed by: HOSPITALIST

## 2020-06-17 PROCEDURE — 97530 THERAPEUTIC ACTIVITIES: CPT

## 2020-06-17 PROCEDURE — 80048 BASIC METABOLIC PNL TOTAL CA: CPT | Performed by: HOSPITALIST

## 2020-06-17 PROCEDURE — 97110 THERAPEUTIC EXERCISES: CPT

## 2020-06-17 PROCEDURE — 99024 POSTOP FOLLOW-UP VISIT: CPT | Performed by: ORTHOPAEDIC SURGERY

## 2020-06-17 RX ORDER — LEVOTHYROXINE SODIUM 0.07 MG/1
75 TABLET ORAL DAILY
Qty: 30 TABLET | Refills: 0 | Status: SHIPPED | OUTPATIENT
Start: 2020-06-17 | End: 2020-07-17

## 2020-06-17 RX ORDER — PANTOPRAZOLE SODIUM 40 MG/1
40 TABLET, DELAYED RELEASE ORAL DAILY
Qty: 30 TABLET | Refills: 0 | Status: SHIPPED | OUTPATIENT
Start: 2020-06-17 | End: 2020-07-17

## 2020-06-17 RX ORDER — LORAZEPAM 0.5 MG/1
0.5 TABLET ORAL EVERY 8 HOURS PRN
Qty: 10 TABLET | Refills: 0 | Status: SHIPPED | OUTPATIENT
Start: 2020-06-17 | End: 2020-06-20

## 2020-06-17 RX ORDER — CEPHALEXIN 250 MG/1
250 CAPSULE ORAL EVERY 8 HOURS
Qty: 6 CAPSULE | Refills: 0 | Status: SHIPPED | OUTPATIENT
Start: 2020-06-17 | End: 2020-06-19

## 2020-06-17 RX ORDER — PSEUDOEPHEDRINE HCL 30 MG
100 TABLET ORAL 2 TIMES DAILY
Qty: 60 EACH | Refills: 0 | Status: SHIPPED | OUTPATIENT
Start: 2020-06-17 | End: 2020-07-17

## 2020-06-17 RX ORDER — HYDROCODONE BITARTRATE AND ACETAMINOPHEN 7.5; 325 MG/1; MG/1
1 TABLET ORAL EVERY 4 HOURS PRN
Qty: 10 TABLET | Refills: 0 | Status: SHIPPED | OUTPATIENT
Start: 2020-06-17 | End: 2020-06-20

## 2020-06-17 RX ORDER — POLYETHYLENE GLYCOL 3350 17 G/17G
17 POWDER, FOR SOLUTION ORAL DAILY
Qty: 30 PACKET | Refills: 0 | Status: SHIPPED | OUTPATIENT
Start: 2020-06-18 | End: 2020-07-18

## 2020-06-17 RX ADMIN — APIXABAN 2.5 MG: 2.5 TABLET, FILM COATED ORAL at 20:04

## 2020-06-17 RX ADMIN — PAROXETINE HYDROCHLORIDE HEMIHYDRATE 20 MG: 20 TABLET, FILM COATED ORAL at 09:04

## 2020-06-17 RX ADMIN — CEPHALEXIN 250 MG: 250 CAPSULE ORAL at 02:00

## 2020-06-17 RX ADMIN — CEPHALEXIN 250 MG: 250 CAPSULE ORAL at 17:59

## 2020-06-17 RX ADMIN — ATORVASTATIN CALCIUM 10 MG: 20 TABLET, FILM COATED ORAL at 20:06

## 2020-06-17 RX ADMIN — APIXABAN 2.5 MG: 2.5 TABLET, FILM COATED ORAL at 09:04

## 2020-06-17 RX ADMIN — DOCUSATE SODIUM 100 MG: 100 CAPSULE, LIQUID FILLED ORAL at 09:05

## 2020-06-17 RX ADMIN — CARVEDILOL 3.12 MG: 3.12 TABLET, FILM COATED ORAL at 17:59

## 2020-06-17 RX ADMIN — DOXEPIN HYDROCHLORIDE 25 MG: 25 CAPSULE ORAL at 20:04

## 2020-06-17 RX ADMIN — PANTOPRAZOLE SODIUM 40 MG: 40 TABLET, DELAYED RELEASE ORAL at 05:23

## 2020-06-17 RX ADMIN — DONEPEZIL HYDROCHLORIDE 10 MG: 10 TABLET, FILM COATED ORAL at 20:04

## 2020-06-17 RX ADMIN — LEVOTHYROXINE SODIUM 75 MCG: 75 TABLET ORAL at 05:23

## 2020-06-17 RX ADMIN — POLYETHYLENE GLYCOL 3350 17 G: 17 POWDER, FOR SOLUTION ORAL at 09:04

## 2020-06-17 RX ADMIN — CARVEDILOL 3.12 MG: 3.12 TABLET, FILM COATED ORAL at 09:04

## 2020-06-17 RX ADMIN — CEPHALEXIN 250 MG: 250 CAPSULE ORAL at 09:05

## 2020-06-17 RX ADMIN — QUETIAPINE FUMARATE 25 MG: 25 TABLET ORAL at 20:04

## 2020-06-17 NOTE — PLAN OF CARE
Problem: Patient Care Overview  Goal: Plan of Care Review  Flowsheets (Taken 6/17/2020 1935)  Plan of Care Reviewed With: patient; daughter; son  Note:   PT POD4 right anterior bipolar hip, vss, afebrile, [facility] ambulance service here at 1500 to transport pt to [facility] but DTR stating that she had called [facility] and notified that pt would not be admitted there but would DC to home. Informed CCP who spoke to family. Attempted to wean O2 as pt has not been on o2 at home prior to admit. continues to require o2 per nc to maintain sats >90%, position changed and attempted to DC O2  with sats down to 83%. O2 back on at 1liter per NC with sats at 90%. Will contl to monitor.

## 2020-06-17 NOTE — PROGRESS NOTES
Continued Stay Note  [facility]     Patient Name: Christianne Ordonez  MRN: [id number]  Today's Date: 6/17/2020    Admit Date: 6/11/2020    Discharge Plan     Row Name 06/17/20 1604       Plan    Plan Comments  Pt/pts family has now decided to d/c home with [facility]. EMS arrived to  pt and was prepared to take pt home. Pt was requiring supplemental 02. Discussed with nurse to see if pt would be okay to be off 02, per [facility] pt desated to 83% on room air resting. Called and spoke with Dr. Moreno to inform of pts desating, was told to have nurse reposition and wean 02 if able.         Discharge Codes    No documentation.       Expected Discharge Date and Time     Expected Discharge Date Expected Discharge Time    Jun 17, 2020             Ana Bose RN

## 2020-06-17 NOTE — PLAN OF CARE
Problem: Fall Risk (Adult)  Goal: Absence of Fall  Outcome: Ongoing (interventions implemented as appropriate)  Flowsheets (Taken 6/17/2020 6792)  Absence of Fall: making progress toward outcome     Problem: Patient Care Overview  Goal: Plan of Care Review  Outcome: Ongoing (interventions implemented as appropriate)  Flowsheets (Taken 6/17/2020 6342)  Progress: improving  Plan of Care Reviewed With: patient  Outcome Summary: pt confused. unable to teach importance of meds for arrhythmia. pain well controlled.

## 2020-06-17 NOTE — PROGRESS NOTES
"Daily progress note    Chief complaint  Doing better  No new complaints   Family at bedside    History of present illness  89-year-old white female with history of coronary artery disease status post CVA osteoarthritis depression hypothyroidism and gastroesophageal of disease brought to the emergency room after the fall with right hip pain.  According to family she lost her balance and fell.  Patient is on Eliquis for chronic atrial fibrillation.  No head injury and work-up in ER revealed right hip fracture admit for management.  At the time of interview she is awake and alert hurting at the right hip no other complaint.  Patient unable to give history most of the history obtained from the chart nursing staff old record and family.  Patient has no fever cough shortness of breath chest pain abdominal pain nausea vomiting diarrhea.     REVIEW OF SYSTEMS  Limited due to the patient's dementia     PHYSICAL EXAM   Blood pressure 113/68, pulse 84, temperature 98.7 °F (37.1 °C), temperature source Skin, resp. rate 18, height 167.6 cm (65.98\"), weight 75.8 kg (167 lb 1.7 oz), SpO2 92 %.    GENERAL: Uncomfortable, nontoxic  HENT: nares patent, scalp is atraumatic  EYES: no scleral icterus  CV: irregular rhythm, regular rate, 2+ right DP pulse  RESPIRATORY: normal effort, clear to auscultation bilaterally  ABDOMEN: soft, nontender  MUSCULOSKELETAL: Right leg is shortened and externally rotated with tenderness to the right greater trochanter, no pain outpatient of the upper extremities or left leg, no C-spine tenderness with full range of motion of the neck  NEURO: alert, moves all extremities, follows commands  SKIN: warm, dry     LAB RESULTS  Lab Results (last 24 hours)     Procedure Component Value Units Date/Time    Basic Metabolic Panel [[id number]]  (Abnormal) Collected:  06/17/20 0413    Specimen:  Blood Updated:  06/17/20 0517     Glucose 117 mg/dL      BUN 13 mg/dL      Creatinine 0.85 mg/dL      Sodium 138 mmol/L      " Potassium 3.9 mmol/L      Chloride 100 mmol/L      CO2 29.6 mmol/L      Calcium 8.4 mg/dL      eGFR Non African Amer 63 mL/min/1.73      BUN/Creatinine Ratio 15.3     Anion Gap 8.4 mmol/L     Narrative:       GFR Normal >60  Chronic Kidney Disease <60  Kidney Failure <15      CBC & Differential [[id number]] Collected:  06/17/20 0413    Specimen:  Blood Updated:  06/17/20 0451    Narrative:       The following orders were created for panel order CBC & Differential.  Procedure                               Abnormality         Status                     ---------                               -----------         ------                     CBC Auto Differential[[id number]]        Abnormal            Final result                 Please view results for these tests on the individual orders.    CBC Auto Differential [[id number]]  (Abnormal) Collected:  06/17/20 0413    Specimen:  Blood Updated:  06/17/20 0451     WBC 13.87 10*3/mm3      RBC 3.83 10*6/mm3      Hemoglobin 11.7 g/dL      Hematocrit 35.3 %      MCV 92.2 fL      MCH 30.5 pg      MCHC 33.1 g/dL      RDW 13.1 %      RDW-SD 43.6 fl      MPV 9.9 fL      Platelets 268 10*3/mm3      Neutrophil % 73.7 %      Lymphocyte % 14.3 %      Monocyte % 7.3 %      Eosinophil % 3.5 %      Basophil % 0.5 %      Immature Grans % 0.7 %      Neutrophils, Absolute 10.22 10*3/mm3      Lymphocytes, Absolute 1.99 10*3/mm3      Monocytes, Absolute 1.01 10*3/mm3      Eosinophils, Absolute 0.48 10*3/mm3      Basophils, Absolute 0.07 10*3/mm3      Immature Grans, Absolute 0.10 10*3/mm3      nRBC 0.0 /100 WBC     Urinalysis With Culture If Indicated - Urine, Random Void [[id number]]  (Abnormal) Collected:  06/16/20 1736    Specimen:  Urine, Random Void Updated:  06/16/20 1758     Color, UA Yellow     Appearance, UA Cloudy     pH, UA <=5.0     Specific Gravity, UA 1.020     Glucose, UA Negative     Ketones, UA Negative     Bilirubin, UA Negative     Blood, UA Negative     Protein, UA Negative      Leuk Esterase, UA Negative     Nitrite, UA Negative     Urobilinogen, UA 1.0 E.U./dL    Narrative:       Urine microscopic not indicated.        Imaging Results (Last 24 Hours)     ** No results found for the last 24 hours. **        ECG 12 Lead        HEART RATE= 67  bpm  RR Interval= 908  ms  OH Interval= 203  ms  P Horizontal Axis= 46  deg  P Front Axis= 7  deg  QRSD Interval= 93  ms  QT Interval= 419  ms  QRS Axis= -46  deg  T Wave Axis= 91  deg  - ABNORMAL ECG -  Sinus rhythm  Atrial premature complex  Left anterior fascicular block  Low voltage, precordial leads  NO SIGNIFICANT CHANGE FROM PREVIOUS ECG             Current Facility-Administered Medications:   •  acetaminophen (TYLENOL) tablet 325 mg, 325 mg, Oral, Q4H PRN, Marcos Jean MD, 325 mg at 06/14/20 1038  •  apixaban (ELIQUIS) tablet 2.5 mg, 2.5 mg, Oral, BID, Marcos Jean MD, 2.5 mg at 06/17/20 0904  •  atorvastatin (LIPITOR) tablet 10 mg, 10 mg, Oral, Nightly, Marcos Jean MD, 10 mg at 06/16/20 2323  •  carvedilol (COREG) tablet 3.125 mg, 3.125 mg, Oral, BID With Meals, Marcos Jean MD, 3.125 mg at 06/17/20 0904  •  cephalexin (KEFLEX) capsule 250 mg, 250 mg, Oral, Q8H, Fortino Moreno MD, 250 mg at 06/17/20 0905  •  docusate sodium (COLACE) capsule 100 mg, 100 mg, Oral, BID, Marcos Jean MD, 100 mg at 06/17/20 0905  •  donepezil (ARICEPT) tablet 10 mg, 10 mg, Oral, Nightly, Marcos Jean MD, 10 mg at 06/16/20 2322  •  doxepin (SINEquan) capsule 25 mg, 25 mg, Oral, Nightly, Marcos Jean MD, 25 mg at 06/16/20 2323  •  HYDROcodone-acetaminophen (NORCO) 7.5-325 MG per tablet 1 tablet, 1 tablet, Oral, Q4H PRN **OR** [DISCONTINUED] HYDROcodone-acetaminophen (NORCO) 7.5-325 MG per tablet 2 tablet, 2 tablet, Oral, Q4H PRN, Marcos Jean MD  •  levothyroxine (SYNTHROID, LEVOTHROID) tablet 75 mcg, 75 mcg, Oral, Q AM, Marcos Jean MD, 75 mcg at 06/17/20 0523  •  LORazepam (ATIVAN) tablet 0.5 mg, 0.5 mg, Oral, Q6H PRN, Marcos Jean MD  •   melatonin tablet 1 mg, 1 mg, Oral, Nightly PRN, Marcos Jean MD  •  [DISCONTINUED] ondansetron (ZOFRAN) tablet 4 mg, 4 mg, Oral, Q6H PRN **OR** ondansetron (ZOFRAN) injection 4 mg, 4 mg, Intravenous, Q6H PRN, Marcos Jean MD  •  pantoprazole (PROTONIX) EC tablet 40 mg, 40 mg, Oral, Q AM, Marcos Jean MD, 40 mg at 06/17/20 0523  •  PARoxetine (PAXIL) tablet 20 mg, 20 mg, Oral, Daily, Marcos Jean MD, 20 mg at 06/17/20 0904  •  polyethylene glycol 3350 powder (packet), 17 g, Oral, Daily, Marcos Jean MD, 17 g at 06/17/20 0904  •  QUEtiapine (SEROquel) tablet 25 mg, 25 mg, Oral, Nightly, Marcos Jean MD, 25 mg at 06/16/20 2323  •  [COMPLETED] Insert peripheral IV, , , Once **AND** sodium chloride 0.9 % flush 10 mL, 10 mL, Intravenous, PRN, Marcos Jean MD, 10 mL at 06/12/20 1403     ASSESSMENT  Acute right hip femoral neck fracture  Status post fall  Acute UTI  Chronic atrial fibrillation on Eliquis  Dementia  Depression  Hyperlipidemia  Hypertension  Hypothyroidism  Anxiety disorder  Osteoarthritis  Gastroesophageal reflux disease    PLAN  Discharge to subacute rehab  Discharge summary dictated    EMILY HERNANDEZ MD

## 2020-06-17 NOTE — PLAN OF CARE
Problem: Patient Care Overview  Goal: Plan of Care Review  Outcome: Ongoing (interventions implemented as appropriate)  Flowsheets (Taken 6/17/2020 9374)  Progress: no change  Plan of Care Reviewed With: patient  Outcome Summary: Pt tolerated treatment fair this date. Required max A x2 for bed mobility, then attempted to stand twice. Unsuccessful attempts w/ assist x2, pt leaning heavily on R side. Pt's daughter and son present to see pt progress and discuss options for pt upon d/c. They would like to have pt d/c home, though unable to have w/c van at this time d/t pt's weakness and inability to complete a stand/transfer. Will attempt transfer again tomorrow. Patient was wearing a face mask during this therapy encounter. Therapist used appropriate personal protective equipment including mask and gloves. Mask used was standard procedure mask. Appropriate PPE was worn during the entire therapy session. Hand hygiene was completed before and after therapy session. Patient is not in enhanced droplet precautions.

## 2020-06-17 NOTE — DISCHARGE SUMMARY
Discharge summary    Date of admission 6/11/2020  Date of discharge 6/18/2020    Final diagnosis  Acute hypoxia  Bilateral atelectasis  Acute right hip femoral neck fracture status bipolar hip arthroplasty  Status post fall  Acute UTI resolving  Chronic atrial fibrillation on Eliquis  Dementia  Depression  Hyperlipidemia  Hypertension  Hypothyroidism  Anxiety disorder  Osteoarthritis  Gastroesophageal reflux disease    Discharge medications    Current Facility-Administered Medications:   •  apixaban (ELIQUIS) tablet 2.5 mg, 2.5 mg, Oral, BID, Marcos Jean MD, 2.5 mg at 06/17/20 0904  •  atorvastatin (LIPITOR) tablet 10 mg, 10 mg, Oral, Nightly, Marcos Jean MD, 10 mg at 06/16/20 2323  •  carvedilol (COREG) tablet 3.125 mg, 3.125 mg, Oral, BID With Meals, Marcos Jean MD, 3.125 mg at 06/17/20 0904  •  cephalexin (KEFLEX) capsule 250 mg, 250 mg, Oral, Q8H, Fortino Moreno MD, 250 mg at 06/17/20 0905  •  docusate sodium (COLACE) capsule 100 mg, 100 mg, Oral, BID, Marcos Jean MD, 100 mg at 06/17/20 0905  •  donepezil (ARICEPT) tablet 10 mg, 10 mg, Oral, Nightly, Marcos Jean MD, 10 mg at 06/16/20 2322  •  doxepin (SINEquan) capsule 25 mg, 25 mg, Oral, Nightly, Marcos Jean MD, 25 mg at 06/16/20 2323  •  HYDROcodone-acetaminophen (NORCO) 7.5-325 MG per tablet 1 tablet, 1 tablet, Oral, Q4H PRN **OR** [DISCONTINUED] HYDROcodone-acetaminophen (NORCO) 7.5-325 MG per tablet 2 tablet, 2 tablet, Oral, Q4H PRN, Marcos Jean MD  •  levothyroxine (SYNTHROID, LEVOTHROID) tablet 75 mcg, 75 mcg, Oral, Q AM, Marcos Jean MD, 75 mcg at 06/17/20 0523  •  pantoprazole (PROTONIX) EC tablet 40 mg, 40 mg, Oral, Q AM, Marcos Jean MD, 40 mg at 06/17/20 0523  •  PARoxetine (PAXIL) tablet 20 mg, 20 mg, Oral, Daily, Marcos Jean MD, 20 mg at 06/17/20 0904  •  polyethylene glycol 3350 powder (packet), 17 g, Oral, Daily, Marcos Jean MD, 17 g at 06/17/20 0904  •  QUEtiapine (SEROquel) tablet 25 mg, 25 mg, Oral, Nightly,  Marcos Jean MD, 25 mg at 06/16/20 2636  •  [COMPLETED] Insert peripheral IV, , , Once **AND** sodium chloride 0.9 % flush 10 mL, 10 mL, Intravenous, PRN, Marcos Jean MD, 10 mL at 06/12/20 2217     Consult obtained  Orthopedic surgery    Procedures  Right hip arthroplasty    Hospital course  89-year-old white female with history of CVA osteoarthritis depression hypothyroidism dementia and gastroesophageal reflux disease admitted through emergency room after the fall with right hip pain.  Patient work-up revealed right hip fracture admit for management.  Patient admitted after medical clearance underwent right total hip arthroplasty without any complication.  Patient also found to have UTI and her cultures remains negative but she is finishing antibiotics and her white count is coming down.  Patient is afebrile versus stable.  Patient clear from orthopedic surgery to be discharged.  Patient Eliquis restarted.  Patient remained DNR throughout hospital course.    As patient discharged to subacute rehab family decided as skilled nursing facility not going to let family visit and they would prefer to take her home with home health.  Patient has decreased O2 sats as she is not taking deep breath we have started incentive spirometry nebulizer and arrange for home oxygen and patient agree with that.  Patient be discharged home with home health arrangement and family support on home oxygen.    Discharge diet soft texture ground    Activity per PT OT    Medication as above    Further care per accepting physician at subacute rehab    EMILY HERNANDEZ MD

## 2020-06-17 NOTE — THERAPY TREATMENT NOTE
Patient Name: Christianne Ordonez  : 1929    MRN: [id number]                              Today's Date: 2020       Admit Date: 2020    Visit Dx:     ICD-10-CM ICD-9-CM   1. Closed fracture of right hip, initial encounter (CMS/Formerly [facility]) S72.001A 820.8   2. Fall, initial encounter W19.XXXA E888.9   3. 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[facility])   • Closed displaced transverse fracture of left patella   • Dementia without behavioral disturbance (CMS/Formerly [facility])   • Mixed hyperlipidemia   • Cerebrovascular accident (CVA) due to embolism of left middle cerebral artery (CMS/Formerly [facility])   • Closed fracture of right hip (CMS/Formerly [facility])     Past Medical History:   Diagnosis Date   • Aphasia    • Arthritis    • Atrial fibrillation (CMS/Formerly [facility])    • Cerebral atherosclerosis    • Depression    • Disease of thyroid gland    • Insomnia    • Stroke (CMS/Formerly [facility])      Past Surgical History:   Procedure Laterality Date   • KNEE SURGERY     • PATELLA OPEN REDUCTION INTERNAL FIXATION Left 2019    Procedure: PATELLA OPEN REDUCTION INTERNAL FIXATION;  Surgeon: Oleg David MD;  [facility];  Service: Orthopedics     General Information     Row Name 20 1621          PT Evaluation Time/Intention    Document Type  therapy note (daily note)  -     Mode of Treatment  physical therapy  -     Row Name 20 1621          General Information    Existing Precautions/Restrictions  fall  -     Row Name 20 162          Cognitive Assessment/Intervention- PT/OT    Orientation Status (Cognition)  oriented to;person;place  -       User Key  (r) = Recorded By, (t) = Taken By, (c) = Cosigned By    Initials Name Provider Type     Mihaela Hall PTA Physical Therapy Assistant        Mobility     Row Name 20 1624          Bed Mobility Assessment/Treatment    Bed Mobility  Assessment/Treatment  supine-sit;sit-supine  -     Supine-Sit [place] (Bed Mobility)  maximum assist (25% patient effort);2 person assist  -SM     Sit-Supine [place] (Bed Mobility)  maximum assist (25% patient effort);2 person assist  -     Assistive Device (Bed Mobility)  bed rails;draw sheet;head of bed elevated  -     Row Name 06/17/20 1624          Transfer Assessment/Treatment    Comment (Transfers)  2 attempts, unable to fully stand  -[facility] Name 06/17/20 1624          Sit-Stand Transfer    Sit-Stand [place] (Transfers)  maximum assist (25% patient effort);dependent (less than 25% patient effort);2 person assist  -     Assistive Device (Sit-Stand Transfers)  walker, front-wheeled  -       User Key  (r) = Recorded By, (t) = Taken By, (c) = Cosigned By    Initials Name Provider Type    Mihaela Aguilar PTA Physical Therapy Assistant        Obj/Interventions     [facility] Name 06/17/20 1626          Therapeutic Exercise    Lower Extremity (Therapeutic Exercise)  LAQ (long arc quad), bilateral  -     Lower Extremity Range of Motion (Therapeutic Exercise)  ankle dorsiflexion/plantar flexion, bilateral  -     Exercise Type (Therapeutic Exercise)  AROM (active range of motion)  -     Position (Therapeutic Exercise)  seated  -     Sets/Reps (Therapeutic Exercise)  5 reps  -SM     Row Name 06/17/20 1626          Static Sitting Balance    Level of [place] (Unsupported Sitting, Static Balance)  moderate assist, 50 to 74% patient effort  -     Sitting Position (Unsupported Sitting, Static Balance)  sitting on edge of bed  -     Time Able to Maintain Position (Unsupported Sitting, Static Balance)  more than 5 minutes  -     Comment (Unsupported Sitting, Static Balance)  posterior leaning, then heavy R sided leaning by the end  -       User Key  (r) = Recorded By, (t) = Taken By, (c) = Cosigned By    Initials Name Provider Type    Mihaela Aguilar PTA Physical  "Therapy Assistant        Goals/Plan    No documentation.       Clinical Impression     Row Name 06/17/20 1627          Pain Assessment    Additional Documentation  Pain Scale: Numbers Pre/Post-Treatment (Group)  -     Row Name 06/17/20 1627          Pain Scale: Numbers Pre/Post-Treatment    Pain Scale: Numbers, Pretreatment  0/10 - no pain  -     Pain Scale: Numbers, Post-Treatment  0/10 - no pain  -SM     Pre/Post Treatment Pain Comment  voices no pain, though states \"just tired\"  -     Row Name 06/17/20 1627          Positioning and Restraints    Pre-Treatment Position  in bed  -     Post Treatment Position  bed  -SM     In Bed  supine;call light within reach;encouraged to call for assist;exit alarm on;with family/caregiver  -       User Key  (r) = Recorded By, (t) = Taken By, (c) = Cosigned By    Initials Name Provider Type    Mihaela Aguilar PTA Physical Therapy Assistant        Outcome Measures     Row Name 06/17/20 1628          How much help from another person do you currently need...    Turning from your back to your side while in flat bed without using bedrails?  2  -SM     Moving from lying on back to sitting on the side of a flat bed without bedrails?  2  -SM     Moving to and from a bed to a chair (including a wheelchair)?  1  -SM     Standing up from a chair using your arms (e.g., wheelchair, bedside chair)?  2  -SM     Climbing 3-5 steps with a railing?  1  -SM     To walk in hospital room?  1  -SM     AM-PAC 6 Clicks Score (PT)  9  -[facility] Name 06/17/20 1628          Functional Assessment    Outcome Measure Options  AM-PAC 6 Clicks Basic Mobility (PT)  -       User Key  (r) = Recorded By, (t) = Taken By, (c) = Cosigned By    Initials Name Provider Type    Mihaela Aguilar PTA Physical Therapy Assistant        Physical Therapy Education                 Title: PT OT SLP Therapies (In Progress)     Topic: Physical Therapy (In Progress)     Point: Mobility training (In " Progress)     Description:   Instruct learner(s) on safety and technique for assisting patient out of bed, chair or wheelchair.  Instruct in the proper use of assistive devices, such as walker, crutches, cane or brace.              Patient Friendly Description:   It's important to get you on your feet again, but we need to do so in a way that is safe for you. Falling has serious consequences, and your personal safety is the most important thing of all.        When it's time to get out of bed, one of us or a family member will sit next to you on the bed to give you support.     If your doctor or nurse tells you to use a walker, crutches, a cane, or a brace, be sure you use it every time you get out of bed, even if you think you don't need it.    Learning Progress Summary           Patient Acceptance, E,D, NR by  at 6/17/2020 1628    Acceptance, E,TB,D, VU,NR by  at 6/16/2020 1547    Acceptance, E,D, NR by MS at 6/15/2020 1616    Acceptance, E, NR by  at 6/14/2020 1310   Family Acceptance, E, NR by  at 6/14/2020 1310                   Point: Home exercise program (In Progress)     Description:   Instruct learner(s) on appropriate technique for monitoring, assisting and/or progressing patient with therapeutic exercises and activities.              Learning Progress Summary           Patient Acceptance, E,D, NR by  at 6/17/2020 1628                   Point: Body mechanics (In Progress)     Description:   Instruct learner(s) on proper positioning and spine alignment for patient and/or caregiver during mobility tasks and/or exercises.              Learning Progress Summary           Patient Acceptance, E,D, NR by  at 6/17/2020 1628    Acceptance, E,TB,D, VU,NR by  at 6/16/2020 1547    Acceptance, E,D, NR by MS at 6/15/2020 1616    Acceptance, E, NR by  at 6/14/2020 1310   Family Acceptance, E, NR by  at 6/14/2020 1310                   Point: Precautions (In Progress)     Description:   Instruct  learner(s) on prescribed precautions during mobility and gait tasks              Learning Progress Summary           Patient Acceptance, E,D, NR by  at 6/17/2020 1628    Acceptance, E,TB,D, VU,NR by  at 6/16/2020 1547    Acceptance, E,D, NR by MS at 6/15/2020 1616    Acceptance, E, NR by  at 6/14/2020 1310   Family Acceptance, E, NR by  at 6/14/2020 1310                               User Key     Initials Effective Dates Name Provider Type Discipline    MS 04/03/18 -  Andrea Newby, PT Physical Therapist PT     03/07/18 -  Mihaela Hall, MADHU Physical Therapy Assistant PT     09/17/19 -  Rosario Dawkins PT Physical Therapist PT              PT Recommendation and Plan     Outcome Summary/Treatment Plan (PT)  Anticipated Discharge Disposition (PT): skilled nursing facility  Plan of Care Reviewed With: patient  Progress: no change  Outcome Summary: Pt tolerated treatment fair this date. Required max A x2 for bed mobility, then attempted to stand twice. Unsuccessful attempts w/ assist x2, pt leaning heavily on R side. Pt's daughter and son present to see pt progress and discuss options for pt upon d/c. They would like to have pt d/c home, though unable to have w/c van at this time d/t pt's weakness and inability to complete a stand/transfer. Will attempt transfer again tomorrow. Patient was wearing a face mask during this therapy encounter. Therapist used appropriate personal protective equipment including mask and gloves. Mask used was standard procedure mask. Appropriate PPE was worn during the entire therapy session. Hand hygiene was completed before and after therapy session. Patient is not in enhanced droplet precautions.     Time Calculation:   PT Charges     Row Name 06/17/20 1634             Time Calculation    Start Time  1600  -      Stop Time  1624  -      Time Calculation (min)  24 min  -SM      PT Received On  06/17/20  -      PT - Next Appointment  06/18/20  -        User Key   (r) = Recorded By, (t) = Taken By, (c) = Cosigned By    Initials Name Provider Type     Rafael Mihaela Ely, PTA Physical Therapy Assistant        Therapy Charges for Today     Code Description Service Date Service Provider Modifiers Qty    57914105595 HC PT THER PROC EA 15 MIN 6/16/2020 Mihaela Hall, PTA GP 1    57007391770 HC PT THER SUPP EA 15 MIN 6/16/2020 Mihaela Hall, PTA GP 2    52202185735 HC PT THERAPEUTIC ACT EA 15 MIN 6/16/2020 Mihaela Hall Ely, PTA GP 1    06511744540 HC PT THER PROC EA 15 MIN 6/17/2020 Mihaela Hall, PTA GP 1    58757405545 HC PT THER SUPP EA 15 MIN 6/17/2020 Mihaela Hall, PTA GP 2    70532987891 HC PT THERAPEUTIC ACT EA 15 MIN 6/17/2020 Mihaela Hall, PTA GP 1          PT G-Codes  Outcome Measure Options: AM-PAC 6 Clicks Basic Mobility (PT)  AM-PAC 6 Clicks Score (PT): 9  AM-PAC 6 Clicks Score (OT): 9    Mihaela Ely Hall PTA  6/17/2020

## 2020-06-17 NOTE — PROGRESS NOTES
Orthopedic Progress Note      Patient: Christianne Ordonez    YOB: 1929    Medical Record Number: [id number]    Attending Physician: Fortino Moreno MD    Date of Admission: 6/11/2020 11:28 PM    Admitting Dx:  Fall, initial encounter [W19.XXXA]  Closed fracture of right hip, initial encounter (CMS/[facility]) [S72.001A]    Status Post: Left bipolar hip arthroplasty, direct anterior.    Post Operative Day Number: 4    Current Problem List:   Patient Active Problem List   Diagnosis   • Acquired aphasia   • A-fib (CMS/[facility])   • Atherosclerotic cerebrovascular disease   • Apoplectic attack (CMS/[facility])   • Closed displaced transverse fracture of left patella   • Dementia without behavioral disturbance (CMS/[facility])   • Mixed hyperlipidemia   • Cerebrovascular accident (CVA) due to embolism of left middle cerebral artery (CMS/[facility])   • Closed fracture of right hip (CMS/[facility])         Past Medical History:   Diagnosis Date   • Aphasia    • Arthritis    • Atrial fibrillation (CMS/[facility])    • Cerebral atherosclerosis    • Depression    • Disease of thyroid gland    • Insomnia    • Stroke (CMS/[facility])        Current Medications:  Scheduled Meds:  apixaban 2.5 mg Oral BID   atorvastatin 10 mg Oral Nightly   carvedilol 3.125 mg Oral BID With Meals   cephalexin 250 mg Oral Q8H   docusate sodium 100 mg Oral BID   donepezil 10 mg Oral Nightly   doxepin 25 mg Oral Nightly   levothyroxine 75 mcg Oral Q AM   pantoprazole 40 mg Oral Q AM   PARoxetine 20 mg Oral Daily   polyethylene glycol 17 g Oral Daily   QUEtiapine 25 mg Oral Nightly     PRN Meds:.•  acetaminophen  •  HYDROcodone-acetaminophen **OR** [DISCONTINUED] HYDROcodone-acetaminophen  •  LORazepam  •  melatonin  •  [DISCONTINUED] ondansetron **OR** ondansetron  •  [COMPLETED] Insert peripheral IV **AND** sodium chloride    SUBJECTIVE: 89 y.o.  female. Awake, but remains confused.     OBJECTIVE:   Vitals:    06/16/20 1900 06/16/20 2300 06/17/20 0300 06/17/20 0641   BP: 128/71 119/64 125/70  121/72   BP Location: Right arm Right arm Right arm Right arm   Patient Position: Lying Lying Lying Lying   Pulse: 86 86 94 98   Resp: 16 16 16 16   Temp: 97.1 °F (36.2 °C) 97 °F (36.1 °C) 98.1 °F (36.7 °C) 97.1 °F (36.2 °C)   TempSrc: Skin Skin Skin Skin   SpO2: 92% 91% 93% 96%   Weight:       Height:         I/O last 3 completed shifts:  In: 1190 [P.O.:1140; IV Piggyback:50]  Out: 1400 [Urine:1400]    Diagnostic Tests:   Lab Results (last 24 hours)     Procedure Component Value Units Date/Time    Basic Metabolic Panel [[id number]]  (Abnormal) Collected:  06/17/20 0413    Specimen:  Blood Updated:  06/17/20 0517     Glucose 117 mg/dL      BUN 13 mg/dL      Creatinine 0.85 mg/dL      Sodium 138 mmol/L      Potassium 3.9 mmol/L      Chloride 100 mmol/L      CO2 29.6 mmol/L      Calcium 8.4 mg/dL      eGFR Non African Amer 63 mL/min/1.73      BUN/Creatinine Ratio 15.3     Anion Gap 8.4 mmol/L     Narrative:       GFR Normal >60  Chronic Kidney Disease <60  Kidney Failure <15      CBC & Differential [[id number]] Collected:  06/17/20 0413    Specimen:  Blood Updated:  06/17/20 0451    Narrative:       The following orders were created for panel order CBC & Differential.  Procedure                               Abnormality         Status                     ---------                               -----------         ------                     CBC Auto Differential[[id number]]        Abnormal            Final result                 Please view results for these tests on the individual orders.    CBC Auto Differential [[id number]]  (Abnormal) Collected:  06/17/20 0413    Specimen:  Blood Updated:  06/17/20 0451     WBC 13.87 10*3/mm3      RBC 3.83 10*6/mm3      Hemoglobin 11.7 g/dL      Hematocrit 35.3 %      MCV 92.2 fL      MCH 30.5 pg      MCHC 33.1 g/dL      RDW 13.1 %      RDW-SD 43.6 fl      MPV 9.9 fL      Platelets 268 10*3/mm3      Neutrophil % 73.7 %      Lymphocyte % 14.3 %      Monocyte % 7.3 %      Eosinophil %  3.5 %      Basophil % 0.5 %      Immature Grans % 0.7 %      Neutrophils, Absolute 10.22 10*3/mm3      Lymphocytes, Absolute 1.99 10*3/mm3      Monocytes, Absolute 1.01 10*3/mm3      Eosinophils, Absolute 0.48 10*3/mm3      Basophils, Absolute 0.07 10*3/mm3      Immature Grans, Absolute 0.10 10*3/mm3      nRBC 0.0 /100 WBC     Urinalysis With Culture If Indicated - Urine, Random Void [[id number]]  (Abnormal) Collected:  06/16/20 1736    Specimen:  Urine, Random Void Updated:  06/16/20 1758     Color, UA Yellow     Appearance, UA Cloudy     pH, UA <=5.0     Specific Gravity, UA 1.020     Glucose, UA Negative     Ketones, UA Negative     Bilirubin, UA Negative     Blood, UA Negative     Protein, UA Negative     Leuk Esterase, UA Negative     Nitrite, UA Negative     Urobilinogen, UA 1.0 E.U./dL    Narrative:       Urine microscopic not indicated.          PHYSICAL EXAM:  Right Anterior hip dressing remains dry and intact. Calf soft and nontender.  Good motion and sensation to right foot and ankle.  Does not appear to be in any acute distress.       ASSESSMENT & PLAN:    Slow progress with PT.  Requires assistance of 2 of stand.     Plan transfer to SNF today.      RTO in 2 weeks to see Dr. Jean      Date: 6/17/2020    OLAYINKA Mendoza MD

## 2020-06-18 ENCOUNTER — APPOINTMENT (OUTPATIENT)
Dept: GENERAL RADIOLOGY | Facility: HOSPITAL | Age: 85
End: 2020-06-18

## 2020-06-18 ENCOUNTER — TELEPHONE (OUTPATIENT)
Dept: ORTHOPEDIC SURGERY | Facility: CLINIC | Age: 85
End: 2020-06-18

## 2020-06-18 VITALS
DIASTOLIC BLOOD PRESSURE: 67 MMHG | WEIGHT: 167.11 LBS | RESPIRATION RATE: 18 BRPM | HEART RATE: 87 BPM | OXYGEN SATURATION: 97 % | TEMPERATURE: 97.1 F | BODY MASS INDEX: 26.86 KG/M2 | SYSTOLIC BLOOD PRESSURE: 130 MMHG | HEIGHT: 66 IN

## 2020-06-18 LAB — NT-PROBNP SERPL-MCNC: 1541 PG/ML (ref 5–1800)

## 2020-06-18 PROCEDURE — 83880 ASSAY OF NATRIURETIC PEPTIDE: CPT | Performed by: HOSPITALIST

## 2020-06-18 PROCEDURE — 99024 POSTOP FOLLOW-UP VISIT: CPT | Performed by: ORTHOPAEDIC SURGERY

## 2020-06-18 PROCEDURE — 71045 X-RAY EXAM CHEST 1 VIEW: CPT

## 2020-06-18 PROCEDURE — 97530 THERAPEUTIC ACTIVITIES: CPT

## 2020-06-18 PROCEDURE — 97110 THERAPEUTIC EXERCISES: CPT

## 2020-06-18 PROCEDURE — 94799 UNLISTED PULMONARY SVC/PX: CPT

## 2020-06-18 PROCEDURE — 94640 AIRWAY INHALATION TREATMENT: CPT

## 2020-06-18 RX ORDER — ACETAMINOPHEN 325 MG/1
650 TABLET ORAL EVERY 6 HOURS PRN
Status: DISCONTINUED | OUTPATIENT
Start: 2020-06-18 | End: 2020-06-18 | Stop reason: HOSPADM

## 2020-06-18 RX ORDER — IPRATROPIUM BROMIDE AND ALBUTEROL SULFATE 2.5; .5 MG/3ML; MG/3ML
3 SOLUTION RESPIRATORY (INHALATION)
Qty: 360 ML | Refills: 0 | Status: SHIPPED | OUTPATIENT
Start: 2020-06-18 | End: 2020-07-18

## 2020-06-18 RX ORDER — IPRATROPIUM BROMIDE AND ALBUTEROL SULFATE 2.5; .5 MG/3ML; MG/3ML
3 SOLUTION RESPIRATORY (INHALATION)
Status: DISCONTINUED | OUTPATIENT
Start: 2020-06-18 | End: 2020-06-18 | Stop reason: HOSPADM

## 2020-06-18 RX ADMIN — PAROXETINE HYDROCHLORIDE HEMIHYDRATE 20 MG: 20 TABLET, FILM COATED ORAL at 09:11

## 2020-06-18 RX ADMIN — IPRATROPIUM BROMIDE AND ALBUTEROL SULFATE 3 ML: .5; 3 SOLUTION RESPIRATORY (INHALATION) at 16:22

## 2020-06-18 RX ADMIN — CARVEDILOL 3.12 MG: 3.12 TABLET, FILM COATED ORAL at 09:12

## 2020-06-18 RX ADMIN — LEVOTHYROXINE SODIUM 75 MCG: 75 TABLET ORAL at 06:23

## 2020-06-18 RX ADMIN — CEPHALEXIN 250 MG: 250 CAPSULE ORAL at 09:11

## 2020-06-18 RX ADMIN — CEPHALEXIN 250 MG: 250 CAPSULE ORAL at 02:26

## 2020-06-18 RX ADMIN — POLYETHYLENE GLYCOL 3350 17 G: 17 POWDER, FOR SOLUTION ORAL at 09:12

## 2020-06-18 RX ADMIN — APIXABAN 2.5 MG: 2.5 TABLET, FILM COATED ORAL at 09:11

## 2020-06-18 RX ADMIN — ACETAMINOPHEN 650 MG: 325 TABLET, FILM COATED ORAL at 12:30

## 2020-06-18 RX ADMIN — DOCUSATE SODIUM 100 MG: 100 CAPSULE, LIQUID FILLED ORAL at 09:12

## 2020-06-18 RX ADMIN — CEPHALEXIN 250 MG: 250 CAPSULE ORAL at 17:10

## 2020-06-18 RX ADMIN — PANTOPRAZOLE SODIUM 40 MG: 40 TABLET, DELAYED RELEASE ORAL at 06:25

## 2020-06-18 RX ADMIN — CARVEDILOL 3.12 MG: 3.12 TABLET, FILM COATED ORAL at 17:10

## 2020-06-18 NOTE — TELEPHONE ENCOUNTER
Patient has decided to go home instead of rehab. HH requesting [facility] order for home health physical therapy for right hip.

## 2020-06-18 NOTE — PROGRESS NOTES
"Daily progress note    Chief complaint  Events noted  Doing same with decreased O2 sats off and on  No new complaints   Family at bedside    History of present illness  89-year-old white female with history of coronary artery disease status post CVA osteoarthritis depression hypothyroidism and gastroesophageal of disease brought to the emergency room after the fall with right hip pain.  According to family she lost her balance and fell.  Patient is on Eliquis for chronic atrial fibrillation.  No head injury and work-up in ER revealed right hip fracture admit for management.  At the time of interview she is awake and alert hurting at the right hip no other complaint.  Patient unable to give history most of the history obtained from the chart nursing staff old record and family.  Patient has no fever cough shortness of breath chest pain abdominal pain nausea vomiting diarrhea.     REVIEW OF SYSTEMS  Limited due to the patient's dementia     PHYSICAL EXAM   Blood pressure 114/67, pulse 86, temperature 97.8 °F (36.6 °C), temperature source Skin, resp. rate 18, height 167.6 cm (65.98\"), weight 75.8 kg (167 lb 1.7 oz), SpO2 96 %.    GENERAL: Uncomfortable, nontoxic  HENT: nares patent, scalp is atraumatic  EYES: no scleral icterus  CV: irregular rhythm, regular rate, 2+ right DP pulse  RESPIRATORY: normal effort, clear to auscultation bilaterally  ABDOMEN: soft, nontender  MUSCULOSKELETAL: Right leg is shortened and externally rotated with tenderness to the right greater trochanter, no pain outpatient of the upper extremities or left leg, no C-spine tenderness with full range of motion of the neck  NEURO: alert, moves all extremities, follows commands  SKIN: warm, dry     LAB RESULTS  Lab Results (last 24 hours)     Procedure Component Value Units Date/Time    BNP [[id number]]  (Normal) Collected:  06/18/20 1230    Specimen:  Blood Updated:  06/18/20 1317     proBNP 1,541.0 pg/mL     Narrative:       Among patients with " dyspnea, NT-proBNP is highly sensitive for the detection of acute congestive heart failure. In addition NT-proBNP of <300 pg/ml effectively rules out acute congestive heart failure with 99% negative predictive value.    Results may be falsely decreased if patient taking Biotin.          Imaging Results (Last 24 Hours)     Procedure Component Value Units Date/Time    XR Chest 1 View [[id number]] Collected:  06/18/20 1325     Updated:  06/18/20 1331    Narrative:       XR CHEST 1 VW-     HISTORY: Female who is 89 years-old,  tachypnea     TECHNIQUE: Frontal view of the chest     COMPARISON: 06/12/2020     FINDINGS: The heart size is borderline. Aorta is tortuous, calcified.  Pulmonary vasculature is unremarkable. No focal pulmonary consolidation,  pleural effusion, or pneumothorax. No acute osseous process.       Impression:       No focal pulmonary consolidation. Borderline heart size.  Tortuous aorta. Follow-up as clinically indicated.     This report was finalized on 6/18/2020 1:28 PM by Dr. Eliazar Goetz M.D.           ECG 12 Lead        HEART RATE= 67  bpm  RR Interval= 908  ms  FL Interval= 203  ms  P Horizontal Axis= 46  deg  P Front Axis= 7  deg  QRSD Interval= 93  ms  QT Interval= 419  ms  QRS Axis= -46  deg  T Wave Axis= 91  deg  - ABNORMAL ECG -  Sinus rhythm  Atrial premature complex  Left anterior fascicular block  Low voltage, precordial leads  NO SIGNIFICANT CHANGE FROM PREVIOUS ECG             Current Facility-Administered Medications:   •  acetaminophen (TYLENOL) tablet 650 mg, 650 mg, Oral, Q6H PRN, Fortino Moreno MD, 650 mg at 06/18/20 1230  •  apixaban (ELIQUIS) tablet 2.5 mg, 2.5 mg, Oral, BID, Marcos Jean MD, 2.5 mg at 06/18/20 0911  •  atorvastatin (LIPITOR) tablet 10 mg, 10 mg, Oral, Nightly, Marcos Jean MD, 10 mg at 06/17/20 2006  •  carvedilol (COREG) tablet 3.125 mg, 3.125 mg, Oral, BID With Meals, Marcos Jean MD, 3.125 mg at 06/18/20 0912  •  cephalexin (KEFLEX) capsule 250 mg,  250 mg, Oral, Q8H, Emily Hernandez MD, 250 mg at 06/18/20 0911  •  docusate sodium (COLACE) capsule 100 mg, 100 mg, Oral, BID, Marcos Jean MD, 100 mg at 06/18/20 0912  •  donepezil (ARICEPT) tablet 10 mg, 10 mg, Oral, Nightly, Marcos Jean MD, 10 mg at 06/17/20 2004  •  doxepin (SINEquan) capsule 25 mg, 25 mg, Oral, Nightly, Marcos Jean MD, 25 mg at 06/17/20 2004  •  HYDROcodone-acetaminophen (NORCO) 7.5-325 MG per tablet 1 tablet, 1 tablet, Oral, Q4H PRN **OR** [DISCONTINUED] HYDROcodone-acetaminophen (NORCO) 7.5-325 MG per tablet 2 tablet, 2 tablet, Oral, Q4H PRN, Marcos Jean MD  •  ipratropium-albuterol (DUO-NEB) nebulizer solution 3 mL, 3 mL, Nebulization, 4x Daily - RT, Emily Hernandez MD  •  levothyroxine (SYNTHROID, LEVOTHROID) tablet 75 mcg, 75 mcg, Oral, Q AM, Marcos Jean MD, 75 mcg at 06/18/20 0623  •  pantoprazole (PROTONIX) EC tablet 40 mg, 40 mg, Oral, Q AM, Marcos Jean MD, 40 mg at 06/18/20 0625  •  PARoxetine (PAXIL) tablet 20 mg, 20 mg, Oral, Daily, Marcos Jean MD, 20 mg at 06/18/20 0911  •  polyethylene glycol 3350 powder (packet), 17 g, Oral, Daily, Marcos Jean MD, 17 g at 06/18/20 0912  •  QUEtiapine (SEROquel) tablet 25 mg, 25 mg, Oral, Nightly, Marcos Jean MD, 25 mg at 06/17/20 2004  •  [COMPLETED] Insert peripheral IV, , , Once **AND** sodium chloride 0.9 % flush 10 mL, 10 mL, Intravenous, PRN, Marcos Jean, MD, 10 mL at 06/12/20 1403     ASSESSMENT  Acute hypoxia on oxygen  Bilateral atelectasis  Acute right hip femoral neck fracture  Status post fall  Acute UTI  Chronic atrial fibrillation on Eliquis  Dementia  Depression  Hyperlipidemia  Hypertension  Hypothyroidism  Anxiety disorder  Osteoarthritis  Gastroesophageal reflux disease    PLAN  Discharge home with home health and home oxygen  Discharge summary dictated    EMILY HERNANDEZ MD

## 2020-06-18 NOTE — PROGRESS NOTES
Orthopedic Progress Note      Patient: Christianne Ordonez    YOB: 1929    Medical Record Number: [id number]    Attending Physician: Fortino Moreno MD    Date of Admission: 6/11/2020 11:28 PM    Admitting Dx:  Fall, initial encounter [W19.XXXA]  Closed fracture of right hip, initial encounter (CMS/[facility]) [S72.001A]    Status Post:Left bipolar hip arthroplasty, direct anterior    Post Operative Day Number: 5    Current Problem List:   Patient Active Problem List   Diagnosis   • Acquired aphasia   • A-fib (CMS/[facility])   • Atherosclerotic cerebrovascular disease   • Apoplectic attack (CMS/[facility])   • Closed displaced transverse fracture of left patella   • Dementia without behavioral disturbance (CMS/[facility])   • Mixed hyperlipidemia   • Cerebrovascular accident (CVA) due to embolism of left middle cerebral artery (CMS/[facility])   • Closed fracture of right hip (CMS/[facility])         Past Medical History:   Diagnosis Date   • Aphasia    • Arthritis    • Atrial fibrillation (CMS/[facility])    • Cerebral atherosclerosis    • Depression    • Disease of thyroid gland    • Insomnia    • Stroke (CMS/[facility])        Current Medications:  Scheduled Meds:  apixaban 2.5 mg Oral BID   atorvastatin 10 mg Oral Nightly   carvedilol 3.125 mg Oral BID With Meals   cephalexin 250 mg Oral Q8H   docusate sodium 100 mg Oral BID   donepezil 10 mg Oral Nightly   doxepin 25 mg Oral Nightly   levothyroxine 75 mcg Oral Q AM   pantoprazole 40 mg Oral Q AM   PARoxetine 20 mg Oral Daily   polyethylene glycol 17 g Oral Daily   QUEtiapine 25 mg Oral Nightly     PRN Meds:.HYDROcodone-acetaminophen **OR** [DISCONTINUED] HYDROcodone-acetaminophen  •  [COMPLETED] Insert peripheral IV **AND** sodium chloride    SUBJECTIVE: 89 y.o.  female. Arouses easily but remains confused.  Answering most questions appropriately    OBJECTIVE:   Vitals:    06/18/20 0230 06/18/20 0359 06/18/20 0725 06/18/20 0916   BP:  103/61 123/66    BP Location:  Right arm Right arm    Patient Position:   "Lying Lying    Pulse:  97 75 95   Resp:  16 16 28   Temp:  97.7 °F (36.5 °C) 97.7 °F (36.5 °C)    TempSrc:  Skin Skin    SpO2: 92% 95% 92% 92%   Weight:       Height:         I/O last 3 completed shifts:  In: 540 [P.O.:540]  Out: 950 [Urine:950]    Diagnostic Tests:   Lab Results (last 24 hours)     ** No results found for the last 24 hours. **          PHYSICAL EXAM:  Right anterior hip dressing dry and intact.  Calf soft and nontender.  Able to wiggle toes when asked.  Patient remains on oxygen.  Unable to wean of oxygen.  Sats drop in 80's when off O2.  Lungs clear but diminished throughout.  BM this morning.  Incontinent both bladder and bowels.        Patient still not ambulating.  Attempted to stand twice yesterday and patient was unable to do so.        ASSESSMENT & PLAN:    Patient was scheduled to go to [facility] yesterday, however it was cancelled by family.  They prefer for her to come home.  They report that they have family members that can take care of her.      Have concerns about patient's safety going home.  Family expressed concerns \"that she will die alone in the rehab\"  I have left a message for her daughter to contact me to discuss this.  My opinion is that the family found out that [facility] was not allowing visitors and the family decided to take her home.      Will defer to medicine.  If patient does go home, would recommend home PT, nursing, and possibly hospice.       Date: 6/18/2020    OLAYINKA Mendoza MD          "

## 2020-06-18 NOTE — PLAN OF CARE
Problem: Patient Care Overview  Goal: Plan of Care Review  Outcome: Ongoing (interventions implemented as appropriate)  Flowsheets (Taken 6/18/2020 0400)  Progress: no change  Plan of Care Reviewed With: patient  Outcome Summary: VSS, unable to wean O2, sats dipping into mid 80's on room air, voiding per purewick, Q2 turns, meds crushed in pudding, no complaints of pain, home with family possible today

## 2020-06-18 NOTE — PLAN OF CARE
Problem: Patient Care Overview  Goal: Plan of Care Review  Outcome: Ongoing (interventions implemented as appropriate)  Flowsheets (Taken 6/18/2020 9200)  Progress: improving  Plan of Care Reviewed With: patient  Outcome Summary: Pt tolerated treatment well this date. Improved success overall, and pt very motivated once told that if she can get up to the chair, then she can go home. Required mod A for bed mobility w/ increased time, then mod A x2 to stand and transfer to recliner. Pt began to take a few small shuffled steps around to chair, though did get weak and required max A x2 to complete shift into chair. Pt's daughter and son present for education and both feel confident in taking her home and providing care for pt. Patient was wearing a face mask during this therapy encounter. Therapist used appropriate personal protective equipment including mask and gloves. Mask used was standard procedure mask. Appropriate PPE was worn during the entire therapy session. Hand hygiene was completed before and after therapy session. Patient is not in enhanced droplet precautions.

## 2020-06-18 NOTE — THERAPY TREATMENT NOTE
Patient Name: Christianne Ordonez  : 1929    MRN: [id number]                              Today's Date: 2020       Admit Date: 2020    Visit Dx:     ICD-10-CM ICD-9-CM   1. Closed fracture of right hip, initial encounter (CMS/[facility]) S72.001A 820.8   2. Fall, initial encounter W19.XXXA E888.9   3. 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[facility])   • Closed displaced transverse fracture of left patella   • Dementia without behavioral disturbance (CMS/[facility])   • Mixed hyperlipidemia   • Cerebrovascular accident (CVA) due to embolism of left middle cerebral artery (CMS/[facility])   • Closed fracture of right hip (CMS/[facility])     Past Medical History:   Diagnosis Date   • Aphasia    • Arthritis    • Atrial fibrillation (CMS/[facility])    • Cerebral atherosclerosis    • Depression    • Disease of thyroid gland    • Insomnia    • Stroke (CMS/[facility])      Past Surgical History:   Procedure Laterality Date   • KNEE SURGERY     • PATELLA OPEN REDUCTION INTERNAL FIXATION Left 2019    Procedure: PATELLA OPEN REDUCTION INTERNAL FIXATION;  Surgeon: Oleg David MD;  Location: [facility] OR;  Service: Orthopedics     General Information     Row Name 20 1437          PT Evaluation Time/Intention    Document Type  therapy note (daily note)  -     Mode of Treatment  physical therapy  -     Row Name 20 143          General Information    Existing Precautions/Restrictions  fall;oxygen therapy device and L/min  -     Row Name 20 1437          Cognitive Assessment/Intervention- PT/OT    Orientation Status (Cognition)  oriented to;person;place  -       User Key  (r) = Recorded By, (t) = Taken By, (c) = Cosigned By    Initials Name Provider Type     Mihaela Hall PTA Physical Therapy Assistant        Mobility     Row Name 20 1437          Bed Mobility  Assessment/Treatment    Bed Mobility Assessment/Treatment  supine-sit  -     Supine-Sit [place] (Bed Mobility)  moderate assist (50% patient effort)  -     Sit-Supine [place] (Bed Mobility)  not tested  -     Assistive Device (Bed Mobility)  bed rails;head of bed elevated  -     Comment (Bed Mobility)  increased time, though pt able to do much more today  -[facility] Name 06/18/20 1437          Transfer Assessment/Treatment    Comment (Transfers)  several cues to stand up tall, though pt was motivated to get to chair in order to go home; even took a few small shuffled steps around from bed to chair  -[facility] Name 06/18/20 1437          Bed-Chair Transfer    Bed-Chair [place] (Transfers)  moderate assist (50% patient effort);2 person assist  -     Assistive Device (Bed-Chair Transfers)  walker, standard  -SM     Row Name 06/18/20 1437          Sit-Stand Transfer    Sit-Stand [place] (Transfers)  moderate assist (50% patient effort);2 person assist  -     Assistive Device (Sit-Stand Transfers)  walker, standard  -       User Key  (r) = Recorded By, (t) = Taken By, (c) = Cosigned By    Initials Name Provider Type    Mihaela Aguilar, PTA Physical Therapy Assistant        Obj/Interventions    No documentation.       Goals/Plan    No documentation.       Clinical Impression     [facility] Name 06/18/20 1441          Pain Assessment    Additional Documentation  Pain Scale: Numbers Pre/Post-Treatment (Group)  -SM     Row Name 06/18/20 1441          Pain Scale: Numbers Pre/Post-Treatment    Pain Scale: Numbers, Pretreatment  0/10 - no pain  -     Pain Scale: Numbers, Post-Treatment  0/10 - no pain  -SM     Row Name 06/18/20 1441          Positioning and Restraints    Pre-Treatment Position  in bed  -     Post Treatment Position  chair  -     In Chair  reclined;call light within reach;encouraged to call for assist;exit alarm on;with family/caregiver;notified nsg  -       User Key   (r) = Recorded By, (t) = Taken By, (c) = Cosigned By    Initials Name Provider Type    Mihaela Aguilar PTA Physical Therapy Assistant        Outcome Measures     Row Name 06/18/20 1442          How much help from another person do you currently need...    Turning from your back to your side while in flat bed without using bedrails?  2  -SM     Moving from lying on back to sitting on the side of a flat bed without bedrails?  2  -SM     Moving to and from a bed to a chair (including a wheelchair)?  2  -SM     Standing up from a chair using your arms (e.g., wheelchair, bedside chair)?  2  -SM     Climbing 3-5 steps with a railing?  1  -SM     To walk in hospital room?  1  -SM     AM-PAC 6 Clicks Score (PT)  10  -SM     Row Name 06/18/20 1442          Functional Assessment    Outcome Measure Options  AM-PAC 6 Clicks Basic Mobility (PT)  -       User Key  (r) = Recorded By, (t) = Taken By, (c) = Cosigned By    Initials Name Provider Type    Mihaela Aguilar PTA Physical Therapy Assistant        Physical Therapy Education                 Title: PT OT SLP Therapies (In Progress)     Topic: Physical Therapy (In Progress)     Point: Mobility training (In Progress)     Description:   Instruct learner(s) on safety and technique for assisting patient out of bed, chair or wheelchair.  Instruct in the proper use of assistive devices, such as walker, crutches, cane or brace.              Patient Friendly Description:   It's important to get you on your feet again, but we need to do so in a way that is safe for you. Falling has serious consequences, and your personal safety is the most important thing of all.        When it's time to get out of bed, one of us or a family member will sit next to you on the bed to give you support.     If your doctor or nurse tells you to use a walker, crutches, a cane, or a brace, be sure you use it every time you get out of bed, even if you think you don't need it.    Learning  Progress Summary           Patient Acceptance, E,D, NR by  at 6/18/2020 1442    Acceptance, E,D, NR by  at 6/17/2020 1628    Acceptance, E,TB,D, VU,NR by  at 6/16/2020 1547    Acceptance, E,D, NR by MS at 6/15/2020 1616    Acceptance, E, NR by MW at 6/14/2020 1310   Family Acceptance, E, NR by MW at 6/14/2020 1310                   Point: Home exercise program (In Progress)     Description:   Instruct learner(s) on appropriate technique for monitoring, assisting and/or progressing patient with therapeutic exercises and activities.              Learning Progress Summary           Patient Acceptance, E,D, NR by  at 6/18/2020 1442    Acceptance, E,D, NR by  at 6/17/2020 1628                   Point: Body mechanics (In Progress)     Description:   Instruct learner(s) on proper positioning and spine alignment for patient and/or caregiver during mobility tasks and/or exercises.              Learning Progress Summary           Patient Acceptance, E,D, NR by  at 6/18/2020 1442    Acceptance, E,D, NR by  at 6/17/2020 1628    Acceptance, E,TB,D, VU,NR by  at 6/16/2020 1547    Acceptance, E,D, NR by MS at 6/15/2020 1616    Acceptance, E, NR by  at 6/14/2020 1310   Family Acceptance, E, NR by  at 6/14/2020 1310                   Point: Precautions (In Progress)     Description:   Instruct learner(s) on prescribed precautions during mobility and gait tasks              Learning Progress Summary           Patient Acceptance, E,D, NR by  at 6/18/2020 1442    Acceptance, E,D, NR by  at 6/17/2020 1628    Acceptance, E,TB,D, VU,NR by  at 6/16/2020 1547    Acceptance, E,D, NR by MS at 6/15/2020 1616    Acceptance, E, NR by  at 6/14/2020 1310   Family Acceptance, E, NR by  at 6/14/2020 1310                               User Key     Initials Effective Dates Name Provider Type Discipline    MS 04/03/18 -  Andrea Newby, PT Physical Therapist PT     03/07/18 -  Mihaela Hall, PTA Physical Therapy  Assistant PT     09/17/19 -  Rosario Dawkins PT Physical Therapist PT              PT Recommendation and Plan     Outcome Summary/Treatment Plan (PT)  Anticipated Discharge Disposition (PT): home with home health, home with 24/7 care  Plan of Care Reviewed With: patient  Progress: improving  Outcome Summary: Pt tolerated treatment well this date. Improved success overall, and pt very motivated once told that if she can get up to the chair, then she can go home. Required mod A for bed mobility w/ increased time, then mod A x2 to stand and transfer to recliner. Pt began to take a few small shuffled steps around to chair, though did get weak and required max A x2 to complete shift into chair. Pt's daughter and son present for education and both feel confident in taking her home and providing care for pt. Patient was wearing a face mask during this therapy encounter. Therapist used appropriate personal protective equipment including mask and gloves. Mask used was standard procedure mask. Appropriate PPE was worn during the entire therapy session. Hand hygiene was completed before and after therapy session. Patient is not in enhanced droplet precautions.     Time Calculation:   PT Charges     Row Name 06/18/20 1445             Time Calculation    Start Time  1332  -      Stop Time  1358  -      Time Calculation (min)  26 min  -SM      PT Received On  06/18/20  -      PT - Next Appointment  06/19/20  -        User Key  (r) = Recorded By, (t) = Taken By, (c) = Cosigned By    Initials Name Provider Type     Mihaela Hall PTA Physical Therapy Assistant        Therapy Charges for Today     Code Description Service Date Service Provider Modifiers Qty    82953092771 HC PT THER PROC EA 15 MIN 6/17/2020 Mihaela Hall PTA GP 1    11622306229 HC PT THER SUPP EA 15 MIN 6/17/2020 Mihaela Hall PTA GP 2    67107739662 HC PT THERAPEUTIC ACT EA 15 MIN 6/17/2020 Mihaela Hall PTA GP 1     60592692370  PT THER PROC EA 15 MIN 6/18/2020 Hall Mihaela Ely, PTA GP 1    54174327498 HC PT THERAPEUTIC ACT EA 15 MIN 6/18/2020 Rafael Mihaela Ely, PTA GP 1    54862867170 HC PT THER SUPP EA 15 MIN 6/18/2020 Margot Hallah Ely, PTA GP 2          PT G-Codes  Outcome Measure Options: AM-PAC 6 Clicks Basic Mobility (PT)  AM-PAC 6 Clicks Score (PT): 10  AM-PAC 6 Clicks Score (OT): 9    Mihaela Hall PTA  6/18/2020

## 2020-06-18 NOTE — PROGRESS NOTES
Continued Stay Note  [facility]     Patient Name: Christianne Ordonez  MRN: [id number]  Today's Date: 6/18/2020    Admit Date: 6/11/2020    Discharge Plan     Row Name 06/18/20 1516       Plan    Plan Comments  Had a long discussion with pt and family regarding d/c planning with Dr. Moreno present. Pts family wishes to d/c home with [facility]. Pts family states they have 4 people able to care for her 24/7 and have been taking care of her ever since she had a stroke. Gave Road to recovery, information for [facility]. Also spoke with pts PCP who recommended pts family calling [facility] [phone] or Sahra Atkinson [phone] for in home care. Pt is new on 02, referral was given to [facility] who is providing 02 and nebulizers. [facility] set up (being paid for by family) for 5pm 6/18. Discussed with family in great detail that MDs strongly recommend SNF but family does not want that due to Covid restrictions on visitors and pts dementia. No other needs identified.    Final Discharge Disposition Code  06 - home with home health care    Final Note  Pt being d/c'ed home with [facility] and family support.        Discharge Codes    No documentation.       Expected Discharge Date and Time     Expected Discharge Date Expected Discharge Time    Jun 17, 2020             Ana Bose RN

## 2020-06-18 NOTE — PLAN OF CARE
Problem: Patient Care Overview  Goal: Plan of Care Review  Outcome: Ongoing (interventions implemented as appropriate)  Flowsheets (Taken 6/18/2020 5495)  Outcome Summary: Pt seen for sitting and stand at EOB with plan to attempt BSC xfer.  Pt unable to safely attempt xfer to B[facility].  Noted plan to d/c home with family.  Discuss with PT and CCP.        Patient was not wearing a face mask during this therapy encounter due to being on oxygen and lower O2 with activity. Therapist used appropriate personal protective equipment including surgical mask and gloves during the entire therapy session. Hand hygiene was completed before and after therapy session. Patient is not in enhanced droplet precautions.

## 2020-06-18 NOTE — THERAPY TREATMENT NOTE
Acute Care - Occupational Therapy Progress Note  [facility]     Patient Name: Christianne Ordonez  : 1929  MRN: [id number]  Today's Date: 2020             Admit Date: 2020       ICD-10-CM ICD-9-CM   1. Closed fracture of right hip, initial encounter (CMS/[facility]) S72.001A 820.8   2. Fall, initial encounter W19.XXXA E888.9   3. 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[facility])   • Closed displaced transverse fracture of left patella   • Dementia without behavioral disturbance (CMS/[facility])   • Mixed hyperlipidemia   • Cerebrovascular accident (CVA) due to embolism of left middle cerebral artery (CMS/[facility])   • Closed fracture of right hip (CMS/[facility])     Past Medical History:   Diagnosis Date   • Aphasia    • Arthritis    • Atrial fibrillation (CMS/[facility])    • Cerebral atherosclerosis    • Depression    • Disease of thyroid gland    • Insomnia    • Stroke (CMS/[facility])      Past Surgical History:   Procedure Laterality Date   • KNEE SURGERY     • PATELLA OPEN REDUCTION INTERNAL FIXATION Left 2019    Procedure: PATELLA OPEN REDUCTION INTERNAL FIXATION;  Surgeon: Oleg David MD;  [facility];  Service: Orthopedics       Therapy Treatment    Rehabilitation Treatment Summary     Row Name 20 0832             Treatment Time/Intention    Discipline  occupational therapist  -LE      Document Type  therapy note (daily note)  -LE      Subjective Information  complains of;weakness  -LE      Mode of Treatment  individual therapy;occupational therapy  -LE      Patient/Family Observations  fowlers, eating breakfast  -LE      Patient Effort  adequate  -LE      Existing Precautions/Restrictions  fall  -LE      Recorded by [LE] Janette Fairbanks OTR 20 6950      Row Name 20 0832             Vital Signs    Pre SpO2 (%)  93  -LE      O2 Delivery Pre Treatment   supplemental O2  -LE      Intra SpO2 (%)  89  -LE      O2 Delivery Intra Treatment  supplemental O2  -LE      Post SpO2 (%)  93  -LE      O2 Delivery Post Treatment  supplemental O2  -LE      Pre Patient Position  Supine  -LE      Intra Patient Position  Standing  -LE      Post Patient Position  Supine  -LE      Recorded by [LE] Janette Fairbanks, OTR 06/18/20 0950      Row Name 06/18/20 0832             Cognitive Assessment/Intervention- PT/OT    Cognitive Assessment/Intervention Comment  confused  -LE      Recorded by [LE] Janette Fairbanks, OTR 06/18/20 0950      Row Name 06/18/20 0832             Bed Mobility Assessment/Treatment    Scooting/Bridging [place] (Bed Mobility)  dependent (less than 25% patient effort) have pt push through non surgery foot to help  -LE      Supine-Sit [place] (Bed Mobility)  maximum assist (25% patient effort);set up;supervision;verbal cues  -LE      Sit-Supine [place] (Bed Mobility)  supervision;verbal cues;set up;maximum assist (25% patient effort)  -LE      Assistive Device (Bed Mobility)  bed rails;draw sheet;head of bed elevated  -LE      Recorded by [LE] Janette Fairbanks, OTR 06/18/20 0950      Row Name 06/18/20 0832             Functional Mobility    Functional Mobility- Ind. Level  unable to perform  -LE      Recorded by [LE] Janette Fairbanks, OTR 06/18/20 0950      Row Name 06/18/20 0832             Transfer Assessment/Treatment    Transfer Assessment/Treatment  toilet transfer  -LE      Recorded by [LE] Janette Fairbanks, OTR 06/18/20 0950      Row Name 06/18/20 0832             Sit-Stand Transfer    Sit-Stand [place] (Transfers)  maximum assist (25% patient effort);verbal cues;set up;supervision  -LE      Assistive Device (Sit-Stand Transfers)  walker, front-wheeled  -LE      Recorded by [LE] Janette Fairbanks, OTR 06/18/20 0950      Row Name 06/18/20 0832             Toilet Transfer    Assistive Device (Toilet Transfer)  -- unable to safely attempt  -LE      Recorded by [LE]  Janette Fairbanks, OTR 06/18/20 0950      Row Name 06/18/20 0832             Self-Feeding Assessment/Training    Position (Self-Feeding)  -- set up,  not eating much, very slow  -LE      Recorded by [LE] Janette Fairbanks OTR 06/18/20 0950      Row Name 06/18/20 0832             Toileting Assessment/Training    [place] Level (Toileting)  dependent (less than 25% patient effort);other (see comments) [place]  -LE      Recorded by [LE] Janette Fairbanks OTR 06/18/20 0950      Row Name 06/18/20 0832             Static Sitting Balance    Level of [place] (Unsupported Sitting, Static Balance)  moderate assist, 50 to 74% patient effort;minimal assist, 75% patient effort  -LE      Sitting Position (Unsupported Sitting, Static Balance)  sitting on edge of bed  -LE      Time Able to Maintain Position (Unsupported Sitting, Static Balance)  1 to 2 minutes  -LE      Recorded by [LE] Janette Fairbanks OTR 06/18/20 0950      Row Name 06/18/20 0832             Static Standing Balance    Level of [place] (Supported Standing, Static Balance)  maximal assist, 25 to 49% patient effort flexed posture  -LE      Assistive Device Utilized (Supported Standing, Static Balance)  walker, rolling  -LE      Recorded by [LE] Janette Fairabnks OTR 06/18/20 0950      Row Name 06/18/20 0832             Positioning and Restraints    Pre-Treatment Position  in bed  -LE      Post Treatment Position  bed  -LE      In Bed  fowlers;call light within reach;encouraged to call for assist;exit alarm on;notified nsg  -LE      Recorded by [LE] Janette Fairbanks OTR 06/18/20 0950      Row Name 06/18/20 0832             Pain Scale: Numbers Pre/Post-Treatment    Pain Scale: Numbers, Pretreatment  -- grimace when moving R LE  -LE      Pain Intervention(s)  Repositioned;Rest  -LE      Recorded by [LE] Janette Fairbanks OTR 06/18/20 0950      Row Name                Wound 06/13/20 0946 Right lateral;anterior hip Incision    Wound - Properties Group Date first assessed: 06/13/20  [AS] Time first assessed: 0946 [AS] Present on Hospital Admission: N [AS] Side: Right [AS] Orientation: lateral;anterior [AS] Location: hip [AS] Primary Wound Type: Incision [AS] Additional Comments: mepilex dressing [AS] Recorded by:  [AS] Sandra Ocampo, RN 06/13/20 0947    Row Name 06/18/20 0832             Coping    Observed Emotional State  calm;cooperative  -LE      Recorded by [LE] Janette Fairbanks OTR 06/18/20 0950      Row Name 06/18/20 0832             Plan of Care Review    Plan of Care Reviewed With  patient  -LE      Outcome Summary  Pt seen for sitting and stand at EOB with plan to attempt BSC xfer.  Pt unable to safely attempt xfer to BSC.  Noted plan to d/c home with family.  Discuss with PT and CCP.   -LE      Recorded by [LE] Janette Fairbanks OTR 06/18/20 0950        User Key  (r) = Recorded By, (t) = Taken By, (c) = Cosigned By    Initials Name Effective Dates Discipline    LE Janette Fairbanks OTR 06/08/18 -  OT    AS Sandra Ocampo RN 06/16/16 -  Nurse        Wound 06/13/20 0946 Right lateral;anterior hip Incision (Active)   Dressing Appearance no drainage;dry;intact 6/18/2020  9:16 AM   Closure JD 6/18/2020  9:16 AM   Base dressing in place, unable to visualize 6/18/2020  9:16 AM   Drainage Amount none 6/18/2020  4:00 AM       Occupational Therapy Education                 Title: PT OT SLP Therapies (In Progress)     Topic: Occupational Therapy (Resolved)     Point: Precautions (Resolved)     Description:   Instruct learner(s) on prescribed precautions during self-care and functional transfers.              Learning Progress Summary           Patient Acceptance, E, NR by NAT at 6/15/2020 0853    Comment:  role of OT, body mechanics while sitting.  pt with confusion and expressive disorder and limits educatin                   Point: Body mechanics (Resolved)     Description:   Instruct learner(s) on proper positioning and spine alignment during self-care, functional mobility activities and/or  exercises.              Learning Progress Summary           Patient Acceptance, E, NR by NAT at 6/15/2020 0853    Comment:  role of OT, body mechanics while sitting.  pt with confusion and expressive disorder and limits educatin                               User Key     Initials Effective Dates Name Provider Type Discipline    NAT 06/08/18 -  Janette Fairbanks OTR Occupational Therapist OT                OT Recommendation and Plan  Outcome Summary/Treatment Plan (OT)  Anticipated Discharge Disposition (OT): skilled nursing facility  Planned Therapy Interventions (OT Eval): activity tolerance training, adaptive equipment training, BADL retraining, cognitive/visual perception retraining, functional balance retraining, patient/caregiver education/training, transfer/mobility retraining, ROM/therapeutic exercise  Therapy Frequency (OT Eval): 5 times/wk  Plan of Care Review  Plan of Care Reviewed With: patient  Plan of Care Reviewed With: patient  Outcome Summary: Pt seen for sitting and stand at EOB with plan to attempt BSC xfer.  Pt unable to safely attempt xfer to BSC.  Noted plan to d/c home with family.  Discuss with PT and CCP.   Outcome Measures     Row Name 06/18/20 0900             How much help from another is currently needed...    Putting on and taking off regular lower body clothing?  1  -LE      Bathing (including washing, rinsing, and drying)  1  -LE      Toileting (which includes using toilet bed pan or urinal)  1  -LE      Putting on and taking off regular upper body clothing  2  -LE      Taking care of personal grooming (such as brushing teeth)  2  -LE      Eating meals  2  -LE      AM-PAC 6 Clicks Score (OT)  9  -LE         Functional Assessment    Outcome Measure Options  AM-PAC 6 Clicks Daily Activity (OT)  -LE        User Key  (r) = Recorded By, (t) = Taken By, (c) = Cosigned By    Initials Name Provider Type    Janette Gutierrez OTR Occupational Therapist           Time Calculation:   Time Calculation- OT      Row Name 06/18/20 0951             Time Calculation- OT    OT Start Time  0818  -LE      OT Stop Time  0832  -LE      OT Time Calculation (min)  14 min  -LE      Total Timed Code Minutes- OT  14 minute(s)  -LE      OT Received On  06/18/20  -LE      OT - Next Appointment  06/19/20  -LE        User Key  (r) = Recorded By, (t) = Taken By, (c) = Cosigned By    Initials Name Provider Type    Janette Gutierrez OTR Occupational Therapist        Therapy Charges for Today     Code Description Service Date Service Provider Modifiers Qty    61831803467  OT THERAPEUTIC ACT EA 15 MIN 6/18/2020 Janette Fairbanks OTR GO 1               RAFAEL Restrepo  6/18/2020

## 2020-06-19 ENCOUNTER — TELEPHONE (OUTPATIENT)
Dept: ORTHOPEDIC SURGERY | Facility: CLINIC | Age: 85
End: 2020-06-19

## 2020-06-19 NOTE — TELEPHONE ENCOUNTER
Patient had RIGHT Anterior BIPOLAR Hip SX 6/13/20 by [facility]. Venu WITT - PT with [facility] needs verbal orders to continue to treat one time for this week, then 3x per week for three weeks & then 2x per week for an additional week after that. PT Venu can be reached at [phone]. Thanks /srh

## 2020-06-22 NOTE — TELEPHONE ENCOUNTER
"Per [facility], \"Please send in the orders for physical therapy for anterior hip arthroplasty rehabilitation protocols. Thank you\" (Unsure where [facility] rehab protocols are located in chart?). Thanks /srh   "

## 2020-06-24 ENCOUNTER — TELEPHONE (OUTPATIENT)
Dept: ORTHOPEDIC SURGERY | Facility: CLINIC | Age: 85
End: 2020-06-24

## 2020-06-24 NOTE — TELEPHONE ENCOUNTER
S/P RIGHT HIP SURGERY BY [facility] ON 6/13/20    Patient has dementia. Naturally having problems with having therapy done but they are working with patient. They want to know if ok for home health to remove staples and have patient f/u with [facility] in [place] office on 7/22/20? (next appt in [place])

## 2020-06-25 NOTE — TELEPHONE ENCOUNTER
Staples can be removed at 2 weeks post hip replacement surgery.  And it would be fine to see her in the July office visit.  If there is no opening for my day in the [place] office then she can be seen by Janet Darling thank you

## 2020-06-26 NOTE — TELEPHONE ENCOUNTER
Left answering machine message for daughter (Cindy) to call me back to schedule her mother's right hip post op appt with [facility] in [place] office on 7/22/20.

## 2020-06-29 NOTE — TELEPHONE ENCOUNTER
I appreciate that complement.  Thank you and we will see her back in the office as previously scheduled thanks

## 2020-06-29 NOTE — TELEPHONE ENCOUNTER
Called and spoke with son Jimi and scheduled post op appt for right hip with RONALDO on 7/22/20 ([place] office).  Son said to please tell RONALDO that he did a great job and his mother is doing really well after surgery.

## 2020-07-22 ENCOUNTER — OFFICE VISIT (OUTPATIENT)
Dept: ORTHOPEDIC SURGERY | Facility: CLINIC | Age: 85
End: 2020-07-22

## 2020-07-22 VITALS — BODY MASS INDEX: 23.07 KG/M2 | WEIGHT: 147 LBS | TEMPERATURE: 98.2 F | HEIGHT: 67 IN

## 2020-07-22 DIAGNOSIS — S72.001A CLOSED FRACTURE OF RIGHT HIP, INITIAL ENCOUNTER (HCC): Primary | ICD-10-CM

## 2020-07-22 DIAGNOSIS — M25.551 RIGHT HIP PAIN: ICD-10-CM

## 2020-07-22 PROCEDURE — 73502 X-RAY EXAM HIP UNI 2-3 VIEWS: CPT | Performed by: ORTHOPAEDIC SURGERY

## 2020-07-22 PROCEDURE — 99024 POSTOP FOLLOW-UP VISIT: CPT | Performed by: ORTHOPAEDIC SURGERY

## 2020-07-22 RX ORDER — LORAZEPAM 0.5 MG/1
TABLET ORAL NIGHTLY
COMMUNITY
Start: 2020-06-29

## 2020-07-22 RX ORDER — ALBUTEROL SULFATE 2.5 MG/3ML
2.5 SOLUTION RESPIRATORY (INHALATION)
COMMUNITY
Start: 2020-07-01 | End: 2020-07-31

## 2020-07-22 RX ORDER — LEVOTHYROXINE SODIUM 0.07 MG/1
75 TABLET ORAL DAILY
COMMUNITY
Start: 2020-06-01 | End: 2021-01-01

## 2020-07-22 NOTE — PROGRESS NOTES
OTHER POST OP GLOBAL     NAME: Christianne Ordonez  ?  : 1929  ?  MRN: [id number]    Chief Complaint   Patient presents with   • Right Hip - Pain, Follow-up     ?  Date of surgery: The patient underwent a right hip bipolar replacement on 2020 for a femur neck fracture.  The surgery was performed by me.    HPI: p/oRight hip arthroplasty  2020                                                                                                            The patient returns today for 6 week follow up of right Femoral neck intracapsular fracture. Incision(s) healed nicely with no signs of infection. Patient reports doing well with no unusual complaints. Appears to be progressing appropriately.  She is an elderly 89-year-old white female who lives in a nursing home.  She has done extremely well following anterior approach for bipolar hip replacement for a femur neck fracture.  She is very pleased with her outcome.  Her son Jimi is also present and states that the patient has tolerated her physical therapy well.  Her ambulation has slowly and progressively improved.  She does not have a limb length discrepancy and is not taking any narcotic medication for pain control.      Review of Systems:      Ortho Exam:   Right hip-prosthesis. The patient is status 6 week(s) post hip prosthetic replacement for a proximal femoral fracture. Incision is clean and healing well. Calf is soft and nontender. Homans sign is negative. There is no limb length discrepancy. Hip flexion is 0-80, hip abduction is 0-30 degrees. Neurovascular status is intact, no limb length discrepancy reported by the patient. Common peroneal nerve function is well preserved. There is no hardware related problem.      Diagnostic Studies:  xrays obtained today  right Hip X-Ray  Indication: Evaluation of implant position after bipolar hip replacement for femur neck fracture.  AP and lateral  Findings: Anatomically placed press-fit bipolar stem without any  subsidence or periprosthetic fractures.  no bony lesion  Soft tissues within normal limits  within normal limits joint spaces  Hardware appropriately positioned yes      yes prior studies available for comparison.    X-RAY was ordered and reviewed by Marcos Jean MD        Assessment:  Christianne was seen today for pain and follow-up.    Diagnoses and all orders for this visit:    Closed fracture of right hip, initial encounter (CMS/[facility])    Right hip pain  -     XR Hip With or Without Pelvis 2 - 3 View Right          Plan   • Incision care  • Continue ice as needed  • Aggressive ROM  • Stretching and strengthening exercises of the hip flexors and abductors.  • Falls precaution and dislocation precautions discussed with the patient's son Jimi.  • Tylenol 500-1000mg by mouth every 6 hours as needed for pain   • Fall precautions  • Follow up in 6 month(s)     Date of encounter: 07/22/2020  Marcos Jean MD

## 2020-11-16 PROBLEM — R41.82 ALTERED MENTAL STATUS: Status: ACTIVE | Noted: 2020-01-01

## 2020-11-16 NOTE — ED NOTES
Pt had mask on when placed in room. RN wore goggles and surgical mask upon entering room.     Pt resting in bed with no needs at this time. Family called and updated about visiting hours and pt location.     Naima Carroll RN  11/16/20 2282

## 2020-11-16 NOTE — ED NOTES
Pt arrived via ems from home. Pt family called stating pt has had AMS over the last couple of days. Per ems pt family reports using a home test kit to test for UTI. At triage pt alert to self. Pt speech slurred and pt unable to follow commands.   Pt  per ems     Pt placed in mask, rn in mask, goggles            George Aguilar RN  11/15/20 4913

## 2020-11-16 NOTE — ED PROVIDER NOTES
EMERGENCY DEPARTMENT ENCOUNTER    CHIEF COMPLAINT  Chief Complaint: Altered mental status  History given by: Nursing report  History limited by: Dementia/altered mentation  Room Number: 13/13  PMD: Ray Torre MD      HPI:  Pt is a 89 y.o. female who presents to the emergency room today secondary to mental status changes.  Per the family, the patient has had previous episodes of similar symptoms when she has tested positive for urinary tract infections.  The patient has baseline confusion secondary to dementia however symptoms have been worsening over the past 24 to 48 hours.  There have been no reports of fever/chills, shortness of breath, cough, or known sick contacts.    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   • Closed displaced transverse fracture of left patella 05/25/2019   • Dementia without behavioral disturbance (CMS/HCC) 08/06/2019   • Mixed hyperlipidemia 08/08/2019   • Cerebrovascular accident (CVA) due to embolism of left middle cerebral artery (CMS/HCC) 02/20/2020   • Closed fracture of right hip (CMS/HCC) 06/12/2020     Resolved Ambulatory Problems     Diagnosis Date Noted   • No Resolved Ambulatory Problems     Past Medical History:   Diagnosis Date   • Aphasia    • Arthritis    • Atrial fibrillation (CMS/[facility])    • Cerebral atherosclerosis    • Depression    • Disease of thyroid gland    • Insomnia    • Stroke (CMS/[facility])        PAST SURGICAL HISTORY  Past Surgical History:   Procedure Laterality Date   • KNEE SURGERY     • PATELLA OPEN REDUCTION INTERNAL FIXATION Left  5/27/2019    Procedure: PATELLA OPEN REDUCTION INTERNAL FIXATION;  Surgeon: Oleg David MD;  Location: [facility] OR;  Service: Orthopedics   • TOTAL HIP ARTHROPLASTY Right 6/13/2020    Procedure: Anterior HIP BIPOLAR CEMENTED;  Surgeon: Marcos Jean MD;  Location: [facility] OR;  Service: Orthopedics;  Laterality: Right;       FAMILY HISTORY  Family History   Problem Relation Age of Onset   • Cancer Sister    • Migraines Other        SOCIAL HISTORY  Social History     Socioeconomic History   • Marital status:      Spouse name: Not on file   • Number of children: Not on file   • Years of education: Not on file   • Highest education level: Not on file   Tobacco Use   • Smoking status: Never Smoker   • Smokeless tobacco: Never Used   • Tobacco comment: caffeine use   Substance and Sexual Activity   • Alcohol use: No   • Drug use: No   • Sexual activity: Never       ALLERGIES  Patient has no known allergies.    REVIEW OF SYSTEMS  Review of Systems   Unable to perform ROS: Mental status change       PHYSICAL EXAM  ED Triage Vitals   Temp Heart Rate Resp BP SpO2   11/15/20 2228 11/15/20 2228 11/15/20 2228 11/15/20 2228 11/15/20 2228   97 °F (36.1 °C) 115 22 149/79 99 %      Temp src Heart Rate Source Patient Position BP Location FiO2 (%)   -- 11/15/20 2239 11/15/20 2239 11/15/20 2239 --    Monitor Lying Right arm        Physical Exam   Constitutional: No distress.   HENT:   Head: Normocephalic and atraumatic.   Eyes: Pupils are equal, round, and reactive to light. EOM are normal.   Neck: Normal range of motion. Neck supple.   Cardiovascular: Regular rhythm and normal heart sounds. Tachycardia present.   Pulmonary/Chest: Effort normal and breath sounds normal. No respiratory distress.   Abdominal: Soft. There is no abdominal tenderness. There is no rebound and no guarding.   Musculoskeletal: Normal range of motion.         General: No edema.   Neurological: She has normal sensation and normal strength.    Patient currently nonverbal and unable to answer questions   Skin: Skin is warm and dry. No rash noted.   Nursing note and vitals reviewed.      LAB RESULTS  Lab Results (last 24 hours)     Procedure Component Value Units Date/Time    CBC & Differential [[id number]]  (Abnormal) Collected: 11/15/20 2311    Specimen: Blood Updated: 11/15/20 2324    Narrative:      The following orders were created for panel order CBC & Differential.  Procedure                               Abnormality         Status                     ---------                               -----------         ------                     CBC Auto Differential[[id number]]        Abnormal            Final result                 Please view results for these tests on the individual orders.    Comprehensive Metabolic Panel [[id number]]  (Abnormal) Collected: 11/15/20 2311    Specimen: Blood Updated: 11/15/20 2348     Glucose 126 mg/dL      BUN 17 mg/dL      Creatinine 0.94 mg/dL      Sodium 140 mmol/L      Potassium 4.5 mmol/L      Chloride 101 mmol/L      CO2 27.4 mmol/L      Calcium 9.6 mg/dL      Total Protein 6.9 g/dL      Albumin 3.70 g/dL      ALT (SGPT) 23 U/L      AST (SGOT) 49 U/L      Alkaline Phosphatase 100 U/L      Total Bilirubin 0.9 mg/dL      eGFR Non African Amer 56 mL/min/1.73      Globulin 3.2 gm/dL      A/G Ratio 1.2 g/dL      BUN/Creatinine Ratio 18.1     Anion Gap 11.6 mmol/L     Narrative:      GFR Normal >60  Chronic Kidney Disease <60  Kidney Failure <15      Troponin [[id number]]  (Normal) Collected: 11/15/20 2311    Specimen: Blood Updated: 11/15/20 2348     Troponin T <0.010 ng/mL     Narrative:      Troponin T Reference Range:  <= 0.03 ng/mL-   Negative for AMI  >0.03 ng/mL-     Abnormal for myocardial necrosis.  Clinicians would have to utilize clinical acumen, EKG, Troponin and serial changes to determine if it is an Acute Myocardial Infarction or myocardial injury due to an underlying chronic condition.       Results may be  falsely decreased if patient taking Biotin.      Magnesium [[id number]]  (Normal) Collected: 11/15/20 2311    Specimen: Blood Updated: 11/15/20 2348     Magnesium 2.3 mg/dL     CBC Auto Differential [[id number]]  (Abnormal) Collected: 11/15/20 2311    Specimen: Blood Updated: 11/15/20 2324     WBC 13.48 10*3/mm3      RBC 4.67 10*6/mm3      Hemoglobin 13.6 g/dL      Hematocrit 41.5 %      MCV 88.9 fL      MCH 29.1 pg      MCHC 32.8 g/dL      RDW 15.3 %      RDW-SD 49.5 fl      MPV 10.3 fL      Platelets 260 10*3/mm3      Neutrophil % 75.7 %      Lymphocyte % 13.1 %      Monocyte % 9.1 %      Eosinophil % 1.1 %      Basophil % 0.6 %      Immature Grans % 0.4 %      Neutrophils, Absolute 10.21 10*3/mm3      Lymphocytes, Absolute 1.76 10*3/mm3      Monocytes, Absolute 1.22 10*3/mm3      Eosinophils, Absolute 0.15 10*3/mm3      Basophils, Absolute 0.08 10*3/mm3      Immature Grans, Absolute 0.06 10*3/mm3      nRBC 0.0 /100 WBC     Urinalysis With Microscopic If Indicated (No Culture) - Urine, Catheter [[id number]]  (Abnormal) Collected: 11/15/20 2322    Specimen: Urine, Catheter Updated: 11/15/20 2340     Color, UA Yellow     Appearance, UA Turbid     pH, UA 6.5     Specific Gravity, UA 1.023     Glucose, UA Negative     Ketones, UA Trace     Bilirubin, UA Negative     Blood, UA Moderate (2+)     Protein,  mg/dL (2+)     Leuk Esterase, UA Large (3+)     Nitrite, UA Negative     Urobilinogen, UA 1.0 E.U./dL    Urinalysis, Microscopic Only - Urine, Catheter [[id number]]  (Abnormal) Collected: 11/15/20 2322    Specimen: Urine, Catheter Updated: 11/15/20 2342     RBC, UA 6-12 /HPF      WBC, UA Too Numerous to Count /HPF      Bacteria, UA 4+ /HPF      Squamous Epithelial Cells, UA 0-2 /HPF      Hyaline Casts, UA 3-6 /LPF      Methodology Automated Microscopy    Urine Culture - Urine, Urine, Catheter [[id number]] Collected: 11/15/20 2322    Specimen: Urine, Catheter Updated: 11/16/20 0454    POC Glucose Once [[id number]]   (Normal) Collected: 11/16/20 0007    Specimen: Blood Updated: 11/16/20 0009     Glucose 118 mg/dL     COVID PRE-OP / PRE-PROCEDURE SCREENING ORDER (NO ISOLATION) - Swab, Nasopharynx [[id number]]  (Normal) Collected: 11/16/20 0038    Specimen: Swab from Nasopharynx Updated: 11/16/20 0207    Narrative:      The following orders were created for panel order COVID PRE-OP / PRE-PROCEDURE SCREENING ORDER (NO ISOLATION) - Swab, Nasopharynx.  Procedure                               Abnormality         Status                     ---------                               -----------         ------                     Respiratory Panel PCR w/...[[id number]]  Normal              Final result                 Please view results for these tests on the individual orders.    Respiratory Panel PCR w/COVID-19(SARS-CoV-2) [facility] In-House, NP Swab in UTM/VTM, 3-4 HR TAT - Swab, Nasopharynx [[id number]]  (Normal) Collected: 11/16/20 0038    Specimen: Swab from Nasopharynx Updated: 11/16/20 0207     ADENOVIRUS, PCR Not Detected     Coronavirus 229E Not Detected     Coronavirus HKU1 Not Detected     Coronavirus NL63 Not Detected     Coronavirus OC43 Not Detected     COVID19 Not Detected     Human Metapneumovirus Not Detected     Human Rhinovirus/Enterovirus Not Detected     Influenza A PCR Not Detected     Influenza B PCR Not Detected     Parainfluenza Virus 1 Not Detected     Parainfluenza Virus 2 Not Detected     Parainfluenza Virus 3 Not Detected     Parainfluenza Virus 4 Not Detected     RSV, PCR Not Detected     Bordetella pertussis pcr Not Detected     Bordetella parapertussis PCR Not Detected     Chlamydophila pneumoniae PCR Not Detected     Mycoplasma pneumo by PCR Not Detected    Narrative:      Fact sheet for providers: https://docs.[facility]/wp-content/uploads/LZQ7264-7024-PE4.1-EUA-Provider-Fact-Sheet-3.pdf    Fact sheet for patients:  https://docs.[facility] [employer]/wp-content/uploads/DAJ3425-3283-IS0.1-EUA-Patient-Fact-Sheet-1.pdf          I ordered the above labs and reviewed the results    RADIOLOGY  CT Head Without Contrast   Final Result   No acute intracranial findings.       Radiation dose reduction techniques were utilized, including automated   exposure control and exposure modulation based on body size.       This report was finalized on 11/16/2020 2:09 AM by Dr. Sandra Montelongo M.D.          XR Chest 1 View   Final Result   No acute findings.       This report was finalized on 11/15/2020 11:46 PM by Dr. Sandra Montelongo M.D.               I ordered the above noted radiological studies. Interpreted by radiologist.  Reviewed by me in PACS.       PROCEDURES  Procedures  EKG          EKG time: 2346  Rhythm/Rate: atrial fibrillation, 100  P waves and OH: absent  QRS, axis: nml, LAD   ST and T waves: nml     Interpreted Contemporaneously by me, independently viewed  changed compared to prior 6/12/20      PROGRESS AND CONSULTS     The patient was wearing a facemask upon entrance into the room and remained in such throughout their visit.  I was wearing PPE including a facemask, eye protection, as well as gloves at any point entering the room and throughout the visit    0225  The patient does have a urinary tract infection by cath specimen.  We will culture the urine and I will give her a gram of ceftriaxone while we are awaiting the head CT.  The patient following this will require admission to the hospital for her mental status changes and UTI.    0300  CT scan is resulted and unremarkable for any acute abnormality    At this point we will continue her antibiotics and admit her to the hospital for further evaluation and treatment of her mental status changes with a UTI.    0455  Case discussed with Dr. Moreno, who agrees to admit the patient to the hospital for further management and treatment.    MEDICAL DECISION MAKING  Results were  reviewed/discussed with the patient and they were also made aware of online access. Pt also made aware that some labs, such as cultures, will not be resulted during ER visit and follow up with PMD is necessary.     MDM  Number of Diagnoses or Management Options  Altered mental status, unspecified altered mental status type:   UTI (urinary tract infection), bacterial:      Amount and/or Complexity of Data Reviewed  Clinical lab tests: ordered and reviewed  Tests in the radiology section of CPT®: ordered and reviewed  Tests in the medicine section of CPT®: ordered and reviewed  Review and summarize past medical records: yes (Upon medical records review, the patient was last seen and evaluated on 6/11/2020 secondary to a fall with a right hip fracture)  Discuss the patient with other providers: yes (Dr. Moreno, who will admit the patient to the hospital)  Independent visualization of images, tracings, or specimens: yes (CT scan of the head unremarkable for any acute abnormality)           DIAGNOSIS  Final diagnoses:   Altered mental status, unspecified altered mental status type   UTI (urinary tract infection), bacterial       DISPOSITION  ADMISSION    Discussed treatment plan and reason for admission with pt/family and admitting physician.  Pt/family voiced understanding of the plan for admission for further testing/treatment as needed.         Latest Documented Vital Signs:  As of 06:28 EST  BP- 150/85 HR- 96 Temp- 97 °F (36.1 °C) O2 sat- 93%         Chilo Hall MD  11/16/20 0608

## 2020-11-16 NOTE — H&P
History and physical    Primary care physician  Dr. Torre    Chief complaint  Altered mental status    History of present illness  89-year-old white female who is well-known to our service with history of severe dementia chronic atrial fibrillation depression hypertension hyperlipidemia hypothyroidism anxiety disorder and gastroesophageal reflux disease who is a nursing home resident presented to [facility] emergency room with altered mental status.  Patient work-up in ER revealed recurrent UTI admit for management.  Patient is demented unable to give any history.  Patient is no respite distress and also according to nursing staff no fever cough or increased shortness of breath.  Patient Covid 19 test also came back negative.  Patient admitted for management.  Patient is DNR per her wishes    PAST MEDICAL HISTORY  • Aphasia     • Arthritis     • Atrial fibrillation (CMS/HCC)     • Cerebral atherosclerosis     • Depression     • Disease of thyroid gland     • Insomnia     • Stroke (CMS/HCC)        PAST SURGICAL HISTORY              Procedure Laterality Date   • KNEE SURGERY       • PATELLA OPEN REDUCTION INTERNAL FIXATION Left 5/27/2019     Procedure: PATELLA OPEN REDUCTION INTERNAL FIXATION;  Surgeon: Oleg David MD;  Location: [facility] OR;  Service: Orthopedics   • TOTAL HIP ARTHROPLASTY Right 6/13/2020     Procedure: Anterior HIP BIPOLAR CEMENTED;  Surgeon: Marcos Jean MD;  Location: [facility] OR;  Service: Orthopedics;  Laterality: Right;        FAMILY HISTORY           Problem Relation Age of Onset   • Cancer Sister     • Migraines Other        SOCIAL HISTORY                 Socioeconomic History   • Marital status:        Spouse name: Not on file   • Number of children: Not on file   • Years of education: Not on file   • Highest education level: Not on file   Tobacco Use   • Smoking status: Never Smoker   • Smokeless tobacco: Never Used   • Tobacco comment: caffeine use  "  Substance and Sexual Activity   • Alcohol use: No   • Drug use: No   • Sexual activity: Never        ALLERGIES  Patient has no known allergies.  Nursing home medications reviewed     REVIEW OF SYSTEMS  Unable to perform     PHYSICAL EXAM  Blood pressure 128/90, pulse 108, temperature 97 °F (36.1 °C), resp. rate 18, height 170.2 cm (67\"), weight 76.2 kg (168 lb), SpO2 91 %.    Constitutional: No distress.   Head: Normocephalic and atraumatic.   Eyes: Pupils are equal, round, and reactive to light. EOM are normal.   Neck: Normal range of motion. Neck supple.   Cardiovascular: Regular rhythm and normal heart sounds. Tachycardia present.   Pulmonary/Chest: Effort normal and breath sounds normal. No respiratory distress.   Abdominal: Soft. There is no abdominal tenderness. There is no rebound and no guarding.   Musculoskeletal: Normal range of motion.    No edema.   Neurological: She has normal sensation and normal strength. Patient currently nonverbal and unable to answer questions   Skin: Skin is warm and dry. No rash noted.     LAB RESULTS  Lab Results (last 24 hours)     Procedure Component Value Units Date/Time    Urine Culture - Urine, Urine, Catheter [[id number]] Collected: 11/15/20 2322    Specimen: Urine, Catheter Updated: 11/16/20 0454    COVID PRE-OP / PRE-PROCEDURE SCREENING ORDER (NO ISOLATION) - Swab, Nasopharynx [[id number]]  (Normal) Collected: 11/16/20 0038    Specimen: Swab from Nasopharynx Updated: 11/16/20 0207    Narrative:      The following orders were created for panel order COVID PRE-OP / PRE-PROCEDURE SCREENING ORDER (NO ISOLATION) - Swab, Nasopharynx.  Procedure                               Abnormality         Status                     ---------                               -----------         ------                     Respiratory Panel PCR w/...[[id number]]  Normal              Final result                 Please view results for these tests on the individual orders.    Respiratory Panel " PCR w/COVID-19(SARS-CoV-2) [facility] In-House, NP Swab in UTM/VTM, 3-4 HR TAT - Swab, Nasopharynx [[id number]]  (Normal) Collected: 11/16/20 0038    Specimen: Swab from Nasopharynx Updated: 11/16/20 0207     ADENOVIRUS, PCR Not Detected     Coronavirus 229E Not Detected     Coronavirus HKU1 Not Detected     Coronavirus NL63 Not Detected     Coronavirus OC43 Not Detected     COVID19 Not Detected     Human Metapneumovirus Not Detected     Human Rhinovirus/Enterovirus Not Detected     Influenza A PCR Not Detected     Influenza B PCR Not Detected     Parainfluenza Virus 1 Not Detected     Parainfluenza Virus 2 Not Detected     Parainfluenza Virus 3 Not Detected     Parainfluenza Virus 4 Not Detected     RSV, PCR Not Detected     Bordetella pertussis pcr Not Detected     Bordetella parapertussis PCR Not Detected     Chlamydophila pneumoniae PCR Not Detected     Mycoplasma pneumo by PCR Not Detected    Narrative:      Fact sheet for providers: https://docs.[employer]/wp-content/uploads/AFM3130-1866-ZX5.1-EUA-Provider-Fact-Sheet-3.pdf    Fact sheet for patients: https://docs.[employer]/wp-content/uploads/TBE5421-5592-EY2.1-EUA-Patient-Fact-Sheet-1.pdf    Surprise Draw [[id number]] Collected: 11/15/20 2311    Specimen: Blood Updated: 11/16/20 0015    Narrative:      The following orders were created for panel order Surprise Draw.  Procedure                               Abnormality         Status                     ---------                               -----------         ------                     Light Blue Top[[id number]]                                   Final result               Green Top (Gel)[[id number]]                                  Final result               Lavender Top[[id number]]                                     Final result               Gold Top - SST[[id number]]                                   Final result                 Please view results for these tests on the individual  orders.    Light Blue Top [[id number]] Collected: 11/15/20 2311    Specimen: Blood Updated: 11/16/20 0015     Extra Tube hold for add-on     Comment: Auto resulted       Green Top (Gel) [[id number]] Collected: 11/15/20 2311    Specimen: Blood Updated: 11/16/20 0015     Extra Tube Hold for add-ons.     Comment: Auto resulted.       Lavender Top [[id number]] Collected: 11/15/20 2311    Specimen: Blood Updated: 11/16/20 0015     Extra Tube hold for add-on     Comment: Auto resulted       Gold Top - SST [[id number]] Collected: 11/15/20 2311    Specimen: Blood Updated: 11/16/20 0015     Extra Tube Hold for add-ons.     Comment: Auto resulted.       POC Glucose Once [[id number]]  (Normal) Collected: 11/16/20 0007    Specimen: Blood Updated: 11/16/20 0009     Glucose 118 mg/dL     Comprehensive Metabolic Panel [[id number]]  (Abnormal) Collected: 11/15/20 2311    Specimen: Blood Updated: 11/15/20 2348     Glucose 126 mg/dL      BUN 17 mg/dL      Creatinine 0.94 mg/dL      Sodium 140 mmol/L      Potassium 4.5 mmol/L      Chloride 101 mmol/L      CO2 27.4 mmol/L      Calcium 9.6 mg/dL      Total Protein 6.9 g/dL      Albumin 3.70 g/dL      ALT (SGPT) 23 U/L      AST (SGOT) 49 U/L      Alkaline Phosphatase 100 U/L      Total Bilirubin 0.9 mg/dL      eGFR Non African Amer 56 mL/min/1.73      Globulin 3.2 gm/dL      A/G Ratio 1.2 g/dL      BUN/Creatinine Ratio 18.1     Anion Gap 11.6 mmol/L     Narrative:      GFR Normal >60  Chronic Kidney Disease <60  Kidney Failure <15      Troponin [[id number]]  (Normal) Collected: 11/15/20 2311    Specimen: Blood Updated: 11/15/20 2348     Troponin T <0.010 ng/mL     Narrative:      Troponin T Reference Range:  <= 0.03 ng/mL-   Negative for AMI  >0.03 ng/mL-     Abnormal for myocardial necrosis.  Clinicians would have to utilize clinical acumen, EKG, Troponin and serial changes to determine if it is an Acute Myocardial Infarction or myocardial injury due to an underlying chronic condition.        Results may be falsely decreased if patient taking Biotin.      Magnesium [[id number]]  (Normal) Collected: 11/15/20 2311    Specimen: Blood Updated: 11/15/20 2348     Magnesium 2.3 mg/dL     Urinalysis, Microscopic Only - Urine, Catheter [[id number]]  (Abnormal) Collected: 11/15/20 2322    Specimen: Urine, Catheter Updated: 11/15/20 2342     RBC, UA 6-12 /HPF      WBC, UA Too Numerous to Count /HPF      Bacteria, UA 4+ /HPF      Squamous Epithelial Cells, UA 0-2 /HPF      Hyaline Casts, UA 3-6 /LPF      Methodology Automated Microscopy    Urinalysis With Microscopic If Indicated (No Culture) - Urine, Catheter [[id number]]  (Abnormal) Collected: 11/15/20 2322    Specimen: Urine, Catheter Updated: 11/15/20 2340     Color, UA Yellow     Appearance, UA Turbid     pH, UA 6.5     Specific Gravity, UA 1.023     Glucose, UA Negative     Ketones, UA Trace     Bilirubin, UA Negative     Blood, UA Moderate (2+)     Protein,  mg/dL (2+)     Leuk Esterase, UA Large (3+)     Nitrite, UA Negative     Urobilinogen, UA 1.0 E.U./dL    CBC & Differential [[id number]]  (Abnormal) Collected: 11/15/20 2311    Specimen: Blood Updated: 11/15/20 2324    Narrative:      The following orders were created for panel order CBC & Differential.  Procedure                               Abnormality         Status                     ---------                               -----------         ------                     CBC Auto Differential[[id number]]        Abnormal            Final result                 Please view results for these tests on the individual orders.    CBC Auto Differential [[id number]]  (Abnormal) Collected: 11/15/20 2311    Specimen: Blood Updated: 11/15/20 2324     WBC 13.48 10*3/mm3      RBC 4.67 10*6/mm3      Hemoglobin 13.6 g/dL      Hematocrit 41.5 %      MCV 88.9 fL      MCH 29.1 pg      MCHC 32.8 g/dL      RDW 15.3 %      RDW-SD 49.5 fl      MPV 10.3 fL      Platelets 260 10*3/mm3      Neutrophil % 75.7 %       Lymphocyte % 13.1 %      Monocyte % 9.1 %      Eosinophil % 1.1 %      Basophil % 0.6 %      Immature Grans % 0.4 %      Neutrophils, Absolute 10.21 10*3/mm3      Lymphocytes, Absolute 1.76 10*3/mm3      Monocytes, Absolute 1.22 10*3/mm3      Eosinophils, Absolute 0.15 10*3/mm3      Basophils, Absolute 0.08 10*3/mm3      Immature Grans, Absolute 0.06 10*3/mm3      nRBC 0.0 /100 WBC         Imaging Results (Last 24 Hours)     Procedure Component Value Units Date/Time    CT Head Without Contrast [[id number]] Collected: 11/16/20 0205     Updated: 11/16/20 0212    Narrative:      CT HEAD WITHOUT CONTRAST     HISTORY: Altered mental status     COMPARISON: 06/11/2020     TECHNIQUE: Axial CT imaging was obtained through the brain. No IV  contrast was administered.     FINDINGS:  No acute intracranial hemorrhage is seen. There is diffuse atrophy.  There is no midline shift or mass effect. There is periventricular and  deep white matter microangiopathic change. There is no midline shift or  mass effect. Old infarct is noted within the left corona radiata/basal  ganglia. The paranasal sinuses and mastoid air cells appear clear.  Additional old lacunar infarct is noted within the right basal ganglia.       Impression:      No acute intracranial findings.     Radiation dose reduction techniques were utilized, including automated  exposure control and exposure modulation based on body size.     This report was finalized on 11/16/2020 2:09 AM by Dr. Sandra Montelongo M.D.       XR Chest 1 View [[id number]] Collected: 11/15/20 2345     Updated: 11/15/20 2349    Narrative:      SINGLE VIEW CHEST     HISTORY: Tachypnea     COMPARISON: 06/18/2020     FINDINGS:  Cardiomegaly is present. There is no definite vascular congestion. There  is tortuosity and ectasia of the thoracic aorta. There is calcification  of the aorta. No pneumothorax, pleural effusion, or acute infiltrate is  seen.       Impression:      No acute findings.     This  report was finalized on 11/15/2020 11:46 PM by Dr. Sandra Montelongo M.D.              ECG 12 Lead               HEART RATE= 100  bpm  RR Interval= 600  ms  MD Interval=   ms  P Horizontal Axis=   deg  P Front Axis=   deg  QRSD Interval= 78  ms  QT Interval= 362  ms  QRS Axis= -35  deg  T Wave Axis= 76  deg  - ABNORMAL ECG -  Atrial fibrillation  Left axis deviation  Low voltage, precordial leads  Nonspecific T abnormalities, lateral leads  Atrial fibrillation new vs previous ecg             Current Facility-Administered Medications:   •  apixaban (ELIQUIS) tablet 2.5 mg, 2.5 mg, Oral, BID, Fortino Moreno MD  •  atorvastatin (LIPITOR) tablet 10 mg, 10 mg, Oral, Nightly, Fortino Moreno MD  •  carvedilol (COREG) tablet 3.125 mg, 3.125 mg, Oral, BID With Meals, Fortino Moreno MD  •  cefTRIAXone (ROCEPHIN) IVPB 1 g, 1 g, Intravenous, Q24H, Fortino Moreno MD  •  donepezil (ARICEPT) tablet 10 mg, 10 mg, Oral, Nightly, Fortino Moreno MD  •  doxepin (SINEquan) capsule 25 mg, 25 mg, Oral, Nightly, Fortino Moreno MD  •  levothyroxine (SYNTHROID, LEVOTHROID) tablet 75 mcg, 75 mcg, Oral, Daily, Fortino Moreno MD  •  LORazepam (ATIVAN) tablet 2 mg, 2 mg, Oral, Daily, Fortino Moreno MD  •  PARoxetine (PAXIL) tablet 20 mg, 20 mg, Oral, Daily, Fortino Moreno MD  •  QUEtiapine (SEROquel) tablet 25 mg, 25 mg, Oral, Nightly, Fortino Moreno MD  •  sodium chloride 0.9 % flush 10 mL, 10 mL, Intravenous, PRN, Chilo Hall MD    Current Outpatient Medications:   •  apixaban (Eliquis) 2.5 MG tablet tablet, Take 1 tablet by mouth 2 (Two) Times a Day. Patient must make a follow up appointment prior to further refills. 8/25/20, Disp: 180 tablet, Rfl: 0  •  atorvastatin (LIPITOR) 20 MG tablet, Take 20 mg by mouth Daily., Disp: , Rfl: 3  •  carvedilol (COREG) 3.125 MG tablet, Take 3.125 mg by mouth 2 (two) times a day with meals., Disp: , Rfl:   •  donepezil (ARICEPT) 10 MG tablet, Take 1 tablet by mouth Every Night., Disp: 30 tablet, Rfl: 5  •   doxepin (SINEquan) 25 MG capsule, TK 1 C PO Q NIGHT, Disp: , Rfl: 3  •  levothyroxine (SYNTHROID, LEVOTHROID) 75 MCG tablet, Take 75 mcg by mouth Daily., Disp: , Rfl:   •  LORazepam (ATIVAN) 0.5 MG tablet, TAKE 4 TABLETS BY MOUTH EVERY DAY, Disp: , Rfl:   •  PARoxetine (PAXIL) 20 MG tablet, TAKE ONE TABLET BY MOUTH ONCE DAILY, Disp: 90 tablet, Rfl: 0  •  QUEtiapine (SEROquel) 25 MG tablet, Take 25 mg by mouth Every Night., Disp: , Rfl:      ASSESSMENT  Acute UTI  Severe dementia  Chronic atrial fibrillation on Eliquis  Hypertension  Hyperlipidemia  Hypothyroidism  Anxiety disorder  Depression  Osteoarthritis  Gastroesophageal reflux disease    PLAN  Admit  IV antibiotics after obtaining the culture  Adjust nursing home medications  Stress ulcer DVT prophylaxis  Supportive care  DNR  Discussed with nursing staff  Follow closely further recommendation according to hospital course    EMILY HERNANDEZ MD

## 2020-11-16 NOTE — PLAN OF CARE
Problem: Adult Inpatient Plan of Care  Goal: Plan of Care Review  Outcome: Ongoing, Progressing  Flowsheets (Taken 11/16/2020 1659)  Progress: no change  Plan of Care Reviewed With:   patient   daughter  Outcome Summary: Admit from ER with UTI, pt alert and orient to self only. Pressure injury noted on admission to heels/ankles, coccyx. Continues on IV rocephin for UTI. Daughter at bedside this evening.  Goal: Patient-Specific Goal (Individualized)  Outcome: Ongoing, Progressing  Goal: Absence of Hospital-Acquired Illness or Injury  Outcome: Ongoing, Progressing  Intervention: Identify and Manage Fall Risk  Recent Flowsheet Documentation  Taken 11/16/2020 1600 by Cierra Shah RN  Safety Promotion/Fall Prevention:   safety round/check completed   nonskid shoes/slippers when out of bed   fall prevention program maintained  Taken 11/16/2020 1436 by Cirera Shah RN  Safety Promotion/Fall Prevention:   safety round/check completed   nonskid shoes/slippers when out of bed   fall prevention program maintained  Intervention: Prevent Skin Injury  Recent Flowsheet Documentation  Taken 11/16/2020 1600 by Cierra Shah RN  Body Position: supine  Taken 11/16/2020 1436 by Cierra Shah RN  Body Position: supine  Intervention: Prevent and Manage VTE (venous thromboembolism) Risk  Recent Flowsheet Documentation  Taken 11/16/2020 1436 by Cierra Shah RN  VTE Prevention/Management:   bilateral   sequential compression devices on  Goal: Optimal Comfort and Wellbeing  Outcome: Ongoing, Progressing  Intervention: Provide Person-Centered Care  Recent Flowsheet Documentation  Taken 11/16/2020 1436 by Cierra Shah RN  Trust Relationship/Rapport: care explained  Goal: Readiness for Transition of Care  Outcome: Ongoing, Progressing  Intervention: Mutually Develop Transition Plan  Recent Flowsheet Documentation  Taken 11/16/2020 1635 by Cierra Shah RN  Transportation Anticipated: family or friend will provide  Patient/Family  Anticipated Services at Transition:   Patient/Family Anticipates Transition to: home with family  Taken 11/16/2020 1634 by Cierra Shah RN  Equipment Currently Used at Home:   oxygen   walker, rolling     Problem: Skin Injury Risk Increased  Goal: Skin Health and Integrity  Outcome: Ongoing, Progressing  Intervention: Optimize Skin Protection  Recent Flowsheet Documentation  Taken 11/16/2020 1600 by Cierra Shah RN  Head of Bed (HOB): HOB at 20-30 degrees  Taken 11/16/2020 1436 by Cierra Shah RN  Pressure Reduction Techniques:   frequent weight shift encouraged   heels elevated off bed  Head of Bed (HOB): HOB at 20-30 degrees  Pressure Reduction Devices:   pressure-redistributing mattress utilized   heel offloading device utilized   alternating pressure pump (ADD)  Skin Protection: incontinence pads utilized     Problem: Fall Injury Risk  Goal: Absence of Fall and Fall-Related Injury  Outcome: Ongoing, Progressing  Intervention: Promote Injury-Free Environment  Recent Flowsheet Documentation  Taken 11/16/2020 1600 by Cierra Shah RN  Safety Promotion/Fall Prevention:   safety round/check completed   nonskid shoes/slippers when out of bed   fall prevention program maintained  Taken 11/16/2020 1436 by Cierra Shah RN  Safety Promotion/Fall Prevention:   safety round/check completed   nonskid shoes/slippers when out of bed   fall prevention program maintained     Problem: UTI (Urinary Tract Infection)  Goal: Improved Infection Symptoms  Outcome: Ongoing, Progressing   Goal Outcome Evaluation:  Plan of Care Reviewed With: patient, daughter  Progress: no change  Outcome Summary: Admit from ER with UTI, pt alert and orient to self only. Pressure injury noted on admission to heels/ankles, coccyx. Continues on IV rocephin for UTI. Daughter at bedside this evening.

## 2020-11-17 NOTE — NURSING NOTE
CWOCN consult for skin.  Patient with DTI to coccyx/ maroon purple discoloration.  Measurement 1x2 cm.  Recommend barrier cream and off load.  Left heel blanchable at this assessment with off loading.  Right lateral ankle with small DTI 1.5x1 cm.  Covered with optifoam dressing.  Declined low air loss mattress at this time.  Patient recently active and hoping to be able to increase mobility.

## 2020-11-17 NOTE — PROGRESS NOTES
"Daily progress note    Chief complaint  Doing much better  No specific complaints   Family at bedside    History of present illness  89-year-old white female who is well-known to our service with history of severe dementia chronic atrial fibrillation depression hypertension hyperlipidemia hypothyroidism anxiety disorder and gastroesophageal reflux disease who is a nursing home resident presented to [facility] emergency room with altered mental status.  Patient work-up in ER revealed recurrent UTI admit for management.  Patient is demented unable to give any history.  Patient is no respite distress and also according to nursing staff no fever cough or increased shortness of breath.  Patient Covid 19 test also came back negative.  Patient admitted for management.  Patient is DNR per her wishes    REVIEW OF SYSTEMS  Unable to perform     PHYSICAL EXAM  Blood pressure 116/74, pulse 95, temperature 98.5 °F (36.9 °C), temperature source Oral, resp. rate 20, height 170.2 cm (67\"), weight 72.6 kg (160 lb 1.6 oz), SpO2 91 %.    Constitutional: No distress.   Head: Normocephalic and atraumatic.   Eyes: Pupils are equal, round, and reactive to light. EOM are normal.   Neck: Normal range of motion. Neck supple.   Cardiovascular: Regular rhythm and normal heart sounds. Tachycardia present.   Pulmonary/Chest: Effort normal and breath sounds normal. No respiratory distress.   Abdominal: Soft. There is no abdominal tenderness. There is no rebound and no guarding.   Musculoskeletal: Normal range of motion.    No edema.   Neurological: She has normal sensation and normal strength. Patient currently nonverbal and unable to answer questions   Skin: Skin is warm and dry. No rash noted.     LAB RESULTS  Lab Results (last 24 hours)     Procedure Component Value Units Date/Time    Urine Culture - Urine, Urine, Catheter [[id number]]  (Abnormal) Collected: 11/15/20 2322    Specimen: Urine, Catheter Updated: 11/17/20 0837     Urine " Culture >100,000 CFU/mL Gram Positive Cocci    BNP [[id number]]  (Normal) Collected: 11/17/20 0438    Specimen: Blood Updated: 11/17/20 0629     proBNP 1,739.0 pg/mL     Narrative:      Among patients with dyspnea, NT-proBNP is highly sensitive for the detection of acute congestive heart failure. In addition NT-proBNP of <300 pg/ml effectively rules out acute congestive heart failure with 99% negative predictive value.    Results may be falsely decreased if patient taking Biotin.      TSH [[id number]]  (Normal) Collected: 11/17/20 0438    Specimen: Blood Updated: 11/17/20 0629     TSH 0.540 uIU/mL     Comprehensive Metabolic Panel [[id number]]  (Abnormal) Collected: 11/17/20 0438    Specimen: Blood Updated: 11/17/20 0626     Glucose 93 mg/dL      BUN 18 mg/dL      Creatinine 0.63 mg/dL      Sodium 140 mmol/L      Potassium 4.3 mmol/L      Chloride 102 mmol/L      CO2 27.2 mmol/L      Calcium 9.1 mg/dL      Total Protein 6.8 g/dL      Albumin 3.10 g/dL      ALT (SGPT) 28 U/L      AST (SGOT) 51 U/L      Alkaline Phosphatase 104 U/L      Total Bilirubin 0.8 mg/dL      eGFR Non African Amer 89 mL/min/1.73      Globulin 3.7 gm/dL      A/G Ratio 0.8 g/dL      BUN/Creatinine Ratio 28.6     Anion Gap 10.8 mmol/L     Narrative:      GFR Normal >60  Chronic Kidney Disease <60  Kidney Failure <15      Lipid Panel [[id number]]  (Abnormal) Collected: 11/17/20 0438    Specimen: Blood Updated: 11/17/20 0626     Total Cholesterol 104 mg/dL      Triglycerides 74 mg/dL      HDL Cholesterol 39 mg/dL      LDL Cholesterol  50 mg/dL      VLDL Cholesterol 15 mg/dL      LDL/HDL Ratio 1.29    Narrative:      Cholesterol Reference Ranges  (U.S. Department of Health and Human Services ATP III Classifications)    Desirable          <200 mg/dL  Borderline High    200-239 mg/dL  High Risk          >240 mg/dL      Triglyceride Reference Ranges  (U.S. Department of Health and Human Services ATP III Classifications)    Normal           <150  mg/dL  Borderline High  150-199 mg/dL  High             200-499 mg/dL  Very High        >500 mg/dL    HDL Reference Ranges  (U.S. Department of Health and Human Services ATP III Classifcations)    Low     <40 mg/dl (major risk factor for CHD)  High    >60 mg/dl ('negative' risk factor for CHD)        LDL Reference Ranges  (U.S. Department of Health and Human Services ATP III Classifcations)    Optimal          <100 mg/dL  Near Optimal     100-129 mg/dL  Borderline High  130-159 mg/dL  High             160-189 mg/dL  Very High        >189 mg/dL    Hemoglobin A1c [[id number]]  (Abnormal) Collected: 11/17/20 0438    Specimen: Blood Updated: 11/17/20 0604     Hemoglobin A1C 5.90 %     Narrative:      Hemoglobin A1C Ranges:    Increased Risk for Diabetes  5.7% to 6.4%  Diabetes                     >= 6.5%  Diabetic Goal                < 7.0%    CBC & Differential [[id number]]  (Abnormal) Collected: 11/17/20 0438    Specimen: Blood Updated: 11/17/20 0557    Narrative:      The following orders were created for panel order CBC & Differential.  Procedure                               Abnormality         Status                     ---------                               -----------         ------                     CBC Auto Differential[[id number]]        Abnormal            Final result                 Please view results for these tests on the individual orders.    CBC Auto Differential [[id number]]  (Abnormal) Collected: 11/17/20 0438    Specimen: Blood Updated: 11/17/20 0557     WBC 14.13 10*3/mm3      RBC 4.70 10*6/mm3      Hemoglobin 13.6 g/dL      Hematocrit 41.1 %      MCV 87.4 fL      MCH 28.9 pg      MCHC 33.1 g/dL      RDW 15.2 %      RDW-SD 47.9 fl      MPV 10.7 fL      Platelets 323 10*3/mm3      Neutrophil % 75.3 %      Lymphocyte % 14.3 %      Monocyte % 8.0 %      Eosinophil % 1.1 %      Basophil % 0.7 %      Immature Grans % 0.6 %      Neutrophils, Absolute 10.65 10*3/mm3      Lymphocytes, Absolute 2.02  10*3/mm3      Monocytes, Absolute 1.13 10*3/mm3      Eosinophils, Absolute 0.15 10*3/mm3      Basophils, Absolute 0.10 10*3/mm3      Immature Grans, Absolute 0.08 10*3/mm3      nRBC 0.1 /100 WBC         Imaging Results (Last 24 Hours)     ** No results found for the last 24 hours. **           ECG 12 Lead               HEART RATE= 100  bpm  RR Interval= 600  ms  NH Interval=   ms  P Horizontal Axis=   deg  P Front Axis=   deg  QRSD Interval= 78  ms  QT Interval= 362  ms  QRS Axis= -35  deg  T Wave Axis= 76  deg  - ABNORMAL ECG -  Atrial fibrillation  Left axis deviation  Low voltage, precordial leads  Nonspecific T abnormalities, lateral leads  Atrial fibrillation new vs previous ecg             Current Facility-Administered Medications:   •  apixaban (ELIQUIS) tablet 2.5 mg, 2.5 mg, Oral, BID, Fortino Moreno MD, 2.5 mg at 11/17/20 0842  •  atorvastatin (LIPITOR) tablet 10 mg, 10 mg, Oral, Nightly, Fortino Moreno MD  •  carvedilol (COREG) tablet 3.125 mg, 3.125 mg, Oral, BID With Meals, Fortino Moreno MD, 3.125 mg at 11/17/20 0842  •  cefTRIAXone (ROCEPHIN) IVPB 1 g, 1 g, Intravenous, Q24H, Fortino Moreno MD, Last Rate: 100 mL/hr at 11/17/20 0032, 1 g at 11/17/20 0032  •  donepezil (ARICEPT) tablet 10 mg, 10 mg, Oral, Nightly, Fortino Moreno MD  •  doxepin (SINEquan) capsule 25 mg, 25 mg, Oral, Nightly, Fortino Moreno MD  •  levothyroxine (SYNTHROID, LEVOTHROID) tablet 75 mcg, 75 mcg, Oral, Daily, Fortino Moreno MD, 75 mcg at 11/17/20 0842  •  LORazepam (ATIVAN) tablet 0.5 mg, 0.5 mg, Oral, Q6H PRN, Fortino Moreno MD  •  pantoprazole (PROTONIX) EC tablet 40 mg, 40 mg, Oral, Q AM, Fortino Moreno MD, 40 mg at 11/17/20 0550  •  PARoxetine (PAXIL) tablet 20 mg, 20 mg, Oral, Daily, Fortino Moreno MD, 20 mg at 11/17/20 0842  •  Pharmacy to dose vancomycin, , Does not apply, Continuous PRN, Fortino Moreno MD  •  QUEtiapine (SEROquel) tablet 25 mg, 25 mg, Oral, Nightly, Fortino Moreno MD  •  sodium chloride 0.9 % flush 10 mL, 10 mL,  Intravenous, PRN, Chilo Hall MD  •  [START ON 11/18/2020] vancomycin 1250 mg/250 mL 0.9% NS IVPB ([facility]), 1,250 mg, Intravenous, Q24H, Emily Moreno MD  •  vancomycin 1500 mg/500 mL 0.9% NS IVPB ([facility]), 20 mg/kg, Intravenous, Once, Emily Moreno MD, 1,500 mg at 11/17/20 1408     ASSESSMENT  Acute GPC UTI  Severe dementia  Chronic atrial fibrillation on Eliquis  Hypertension  Hyperlipidemia  Hypothyroidism  Anxiety disorder  Depression  Osteoarthritis  Gastroesophageal reflux disease    PLAN  CPM  IV antibiotics   Check culture and sensitivity  Adjust nursing home medications  Stress ulcer DVT prophylaxis  Supportive care  DNR  PT/OT  Discussed with nursing staff and family  Follow closely further recommendation according to hospital course    EMILY MORENO MD

## 2020-11-17 NOTE — PROGRESS NOTES
[facility]  Clinical Pharmacy Department     Vancomycin Pharmacokinetic Note    Christianne Ordonez is a 89 y.o. female who is on day 1 of pharmacy to dose vancomycin for UTI.    Target level: AUC24 400-600  Consulting Provider:  Dr. Moreno  Current Vancomycin Dose:  TBD  Planned Duration of Therapy: 5 days  Other Antimicrobials: CTX 1g IV q24h     Allergies  Allergies as of 11/15/2020   • (No Known Allergies)       Microbiology:  Microbiology Results (last 10 days)     Procedure Component Value - Date/Time    COVID PRE-OP / PRE-PROCEDURE SCREENING ORDER (NO ISOLATION) - Swab, Nasopharynx [[id number]]  (Normal) Collected: 11/16/20 0038    Lab Status: Final result Specimen: Swab from Nasopharynx Updated: 11/16/20 0207    Narrative:      The following orders were created for panel order COVID PRE-OP / PRE-PROCEDURE SCREENING ORDER (NO ISOLATION) - Swab, Nasopharynx.  Procedure                               Abnormality         Status                     ---------                               -----------         ------                     Respiratory Panel PCR w/...[[id number]]  Normal              Final result                 Please view results for these tests on the individual orders.    Respiratory Panel PCR w/COVID-19(SARS-CoV-2) [facility] In-House, NP Swab in UTM/VTM, 3-4 HR TAT - Swab, Nasopharynx [[id number]]  (Normal) Collected: 11/16/20 0038    Lab Status: Final result Specimen: Swab from Nasopharynx Updated: 11/16/20 0207     ADENOVIRUS, PCR Not Detected     Coronavirus 229E Not Detected     Coronavirus HKU1 Not Detected     Coronavirus NL63 Not Detected     Coronavirus OC43 Not Detected     COVID19 Not Detected     Human Metapneumovirus Not Detected     Human Rhinovirus/Enterovirus Not Detected     Influenza A PCR Not Detected     Influenza B PCR Not Detected     Parainfluenza Virus 1 Not Detected     Parainfluenza Virus 2 Not Detected     Parainfluenza Virus 3 Not Detected     Parainfluenza Virus  "4 Not Detected     RSV, PCR Not Detected     Bordetella pertussis pcr Not Detected     Bordetella parapertussis PCR Not Detected     Chlamydophila pneumoniae PCR Not Detected     Mycoplasma pneumo by PCR Not Detected    Narrative:      Fact sheet for providers: https://docs.[employer]/wp-content/uploads/AXV0743-6744-EG5.1-EUA-Provider-Fact-Sheet-3.pdf    Fact sheet for patients: https://docs.[employer]/wp-content/uploads/KAN3701-9749-AN2.1-EUA-Patient-Fact-Sheet-1.pdf    Urine Culture - Urine, Urine, Catheter [[id number]]  (Abnormal) Collected: 11/15/20 2322    Lab Status: Preliminary result Specimen: Urine, Catheter Updated: 11/17/20 0837     Urine Culture >100,000 CFU/mL Gram Positive Cocci            Vitals/Labs/I&O  170.2 cm (67\")  72.6 kg (160 lb 1.6 oz)  Body mass index is 25.08 kg/m².   Temp:  [98 °F (36.7 °C)-99.2 °F (37.3 °C)] 98.5 °F (36.9 °C)  Heart Rate:  [] 95  Resp:  [16-20] 20  BP: (116-143)/(74-88) 116/74    Results from last 7 days   Lab Units 11/17/20  0438 11/15/20  2311   WBC 10*3/mm3 14.13* 13.48*       Estimated Creatinine Clearance: 52.5 mL/min (by C-G formula based on SCr of 0.63 mg/dL).  Results from last 7 days   Lab Units 11/17/20  0438 11/15/20  2311   BUN mg/dL 18 17   CREATININE mg/dL 0.63 0.94     Intake & Output (last 3 days)       11/14 0701 - 11/15 0700 11/15 0701 - 11/16 0700 11/16 0701 - 11/17 0700 11/17 0701 - 11/18 0700    P.O.   0 0    IV Piggyback  50      Total Intake(mL/kg)  50 (0.7) 0 (0) 0 (0)    Urine (mL/kg/hr)   250 (0.1)     Total Output   250     Net  +50 -250 0                  Vancomycin Dosing and Level History:      Assessment:  Bayesian analysis of the most recent level(s) using [employer] provides the following patient-specific pharmacokinetic parameters:   CL: 2.78 L/h   Vd: 60.6 L   T1/2: 17.5 h  If the predicted trough on this regimen is not within what was previously considered a \"target trough range\", the AUC24 (a stronger predictor of " vancomycin efficacy) is predicted to achieve the accepted target of 400-600 mg*h/L. To best balance efficacy and toxicity, we will be targeting AUC24 in this patient rather than steady-state trough levels.     Initiating the regimen of 1250 mg (~17.2 mg/kg) IV every 24 hours gives a predicted steady-state trough of 11.6 mg/L and AUC24 of 415 mg*h/L based upon population pharmacokinetics and this patient's specific parameters.    Recommendations/Plan:  -Will order vancomycin 20 mg/kg loading dose and initiate vancomycin 1250 mg (~17.2 mg/kg) IV every 24 hours thereafter.   -Obtain vancomycin level on 11/19 prior to the 3rd overall dose or sooner if acute changes   -Continue to monitor Scr - next level with AM labs.    Thank you for involving pharmacy in this patient's care. Please contact pharmacy with any questions or concerns.                           Alex Malave [facility]  Clinical Pharmacist  11/17/20 13:56 EST

## 2020-11-17 NOTE — PLAN OF CARE
Problem: Adult Inpatient Plan of Care  Goal: Plan of Care Review  Flowsheets  Taken 11/17/2020 1149  Plan of Care Reviewed With: patient  Outcome Summary: Pt. is a 89 year old Female admitted to the hospital with increased A.M.S.  Pt. with a h/o dementia per chart review.  Per family, prior to 4 days pre-admission pt. was independent with functional mobility and use of a Rwx for ambulation.  Pt. currently presents with decreased strength, decreased ROM, decreased balance, and decreased tolerance to functional activity. Pt. will benefit from skilled inpt. P.T. to address her functional deficits and to assist pt. in regaining her maximum level of independence with functional mobility.  Taken 11/17/2020 1145  Plan of Care Reviewed With: patient   Patient was wearing a face mask during this therapy encounter. Therapist used appropriate personal protective equipment including eye protection, mask, and gloves.  Mask used was standard procedure mask. Appropriate PPE was worn during the entire therapy session. Hand hygiene was completed before and after therapy session. Patient is not in enhanced droplet precautions.

## 2020-11-17 NOTE — PROGRESS NOTES
"Adult Nutrition  Assessment/PES    Patient Name:  Christianne Ordonez  YOB: 1929  MRN: [id number]  Admit Date:  11/15/2020    Assessment Date:  11/17/2020    Comments:  Nutri. Screen: Multiple PI's to BLE ankles, coccyx, and R foot, B=13. On regular diet but has refused PO intake since admission. Currently with AMS d/t acute UTI with underlying severe dementia. Wt stable per index. Will add variety of supplements. Predict intake will be slow to advance d/t AMS. Will continue to follow.    Due to the epidemic and in efforts to preserve PPE, nutrition assessment completed based on review of electronic medical record and/or discussion with (RN/MD). This RD currently working remotely and can be reached via secure chat or email.  Reason for Assessment     Row Name 11/17/20 0949          Reason for Assessment    Reason For Assessment  identified at risk by screening criteria     Diagnosis  infection/sepsis;neurologic conditions Acute UTI, severe dementia, chronic afib, HTN, HLD, hypothyroid, GERD, and osteoarthritis.     Identified At Risk by Screening Criteria  large or nonhealing wound, burn or pressure injury         Nutrition/Diet History     Row Name 11/17/20 0953          Nutrition/Diet History    Typical Food/Fluid Intake  Multiple PI's to BLE ankles, coccyx, and R foot. On regular diet but has refused PO intake since admission. Wt stable per index.     Factors Affecting Nutritional Intake  impaired cognitive status/motor control         Anthropometrics     Row Name 11/17/20 1001          Anthropometrics    Height  170.2 cm (67\")        Ideal Body Weight (IBW)    Ideal Body Weight (IBW) (kg)  61.86         Labs/Tests/Procedures/Meds     Row Name 11/17/20 0954          Labs/Procedures/Meds    Lab Results Reviewed  reviewed     Lab Results Comments  AST, Alb, A1C 5.9, WBC        Diagnostic Tests/Procedures    Diagnostic Test/Procedure Reviewed  reviewed        Medications    Pertinent Medications Reviewed  " "reviewed     Pertinent Medications Comments  IV rocephin, synthroid, protonix         Physical Findings     Row Name 11/17/20 1000          Physical Findings    Skin  other (see comments);pressure injury BLE ankle, coccyx, and R foot wounds, B=13         Estimated/Assessed Needs     Row Name 11/17/20 1001          Calculation Measurements    Weight Used For Calculations  72.6 kg (160 lb 0.9 oz)     Height  170.2 cm (67\")        Estimated/Assessed Needs    Additional Documentation  Fluid Requirements (Group);KCAL/KG (Group);Protein Requirements (Group)        KCAL/KG    KCAL/KG  25 Kcal/Kg (kcal);30 Kcal/Kg (kcal)     25 Kcal/Kg (kcal)  1815     30 Kcal/Kg (kcal)  2178        Protein Requirements    Weight Used For Protein Calculations  72.6 kg (160 lb 0.9 oz)     Est Protein Requirement Amount (gms/kg)  1.4 gm protein     Estimated Protein Requirements (gms/day)  101.64        Fluid Requirements    Fluid Requirements (mL/day)  2178     Estimated Fluid Requirement Method  RDA Method     RDA Method (mL)  2178         Nutrition Prescription Ordered     Row Name 11/17/20 1001          Nutrition Prescription PO    Current PO Diet  Regular     Fluid Consistency  Thin         Problem/Interventions:  Problem 1     Row Name 11/17/20 1006          Nutrition Diagnoses Problem 1    Problem 1  Predicted Suboptimal Intake     Etiology (related to)  Medical Diagnosis     Infectious Disease  UTI     Neurological  AMS;Dementia     Signs/Symptoms (evidenced by)  PO Intake     Percent (%) intake recorded  0 %         Intervention Goal     Row Name 11/17/20 1006          Intervention Goal    General  Maintain nutrition;Reduce/improve symptoms;Meet nutritional needs for age/condition;Disease management/therapy;Nutrition support treatment     PO  Establish PO;Increase intake;Meet estimated needs     Weight  Maintain weight         Nutrition Intervention     Row Name 11/17/20 1009          Nutrition Intervention    RD/Tech Action  Care " plan reviewd;Follow Tx progress;Encourage intake;Recommend/ordered     Recommended/Ordered  Supplement         Nutrition Prescription     Row Name 11/17/20 1009          Nutrition Prescription PO    PO Prescription  Begin/change supplement     Supplement  Boost Pudding;Boost Plus;Magic Cup     Supplement Frequency  3 times a day     New PO Prescription Ordered?  Yes         Education/Evaluation     Row Name 11/17/20 1009          Education    Education  Education not appropriate at this time     Please explain  Patient confusion        Monitor/Evaluation    Monitor  Per protocol;PO intake;Supplement intake;Weight;Skin status;Symptoms     Education Follow-up  Reinforce PRN           Electronically signed by:  Kelsey Caceres, MS,RD,LD  11/17/20 10:10 EST

## 2020-11-17 NOTE — THERAPY EVALUATION
Patient Name: Christianne Ordonez  : 1929    MRN: [id number]                              Today's Date: 2020       Admit Date: 11/15/2020    Visit Dx:     ICD-10-CM ICD-9-CM   1. Altered mental status, unspecified altered mental status type  R41.82 780.97   2. 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[facility])   • Closed displaced transverse fracture of left patella   • Dementia without behavioral disturbance (CMS/[facility])   • Mixed hyperlipidemia   • Cerebrovascular accident (CVA) due to embolism of left middle cerebral artery (CMS/[facility])   • Closed fracture of right hip (CMS/[facility])   • Altered mental status     Past Medical History:   Diagnosis Date   • Aphasia    • Arthritis    • Atrial fibrillation (CMS/[facility])    • Cerebral atherosclerosis    • Depression    • Disease of thyroid gland    • Insomnia    • Stroke (CMS/[facility])      Past Surgical History:   Procedure Laterality Date   • KNEE SURGERY     • PATELLA OPEN REDUCTION INTERNAL FIXATION Left 2019    Procedure: PATELLA OPEN REDUCTION INTERNAL FIXATION;  Surgeon: Oleg David MD;  Location: [facility];  Service: Orthopedics   • TOTAL HIP ARTHROPLASTY Right 2020    Procedure: Anterior HIP BIPOLAR CEMENTED;  Surgeon: Marcos Jean MD;  Location: [facility];  Service: Orthopedics;  Laterality: Right;     General Information     Row Name 20 1105          OT Time and Intention    Document Type  evaluation  -RB     Mode of Treatment  individual therapy  -RB     Row Name 20 1108          General Information    Patient Profile Reviewed  yes  -RB     Prior Level of Function  mod assist:;ADL's;transfer  -RB     Existing Precautions/Restrictions  fall  -RB     Barriers to Rehab  none identified  -RB     Row Name 20 110          Living Environment    Lives With   child(cortez), adult  -RB     Row Name 11/17/20 1105          Cognition    Orientation Status (Cognition)  oriented to;person  -RB     Row Name 11/17/20 1105          Safety Issues, Functional Mobility    Safety Issues Affecting Function (Mobility)  safety precautions follow-through/compliance;safety precaution awareness;ability to follow commands;problem-solving;sequencing abilities  -RB     Impairments Affecting Function (Mobility)  balance;cognition;coordination;endurance/activity tolerance;motor control;postural/trunk control;pain;strength  -RB       User Key  (r) = Recorded By, (t) = Taken By, (c) = Cosigned By    Initials Name Provider Type    RB Rashmi Mendez OT Occupational Therapist        Mobility/ADL's     Row Name 11/17/20 1107          Bed Mobility    Bed Mobility  bed mobility (all) activities  -RB     All Activities, [place] (Bed Mobility)  maximum assist (25% patient effort);1 person assist  -RB     Bed Mobility, Safety Issues  decreased use of arms for pushing/pulling;decreased use of legs for bridging/pushing;cognitive deficits limit understanding  -RB     Assistive Device (Bed Mobility)  bed rails;draw sheet  -RB     Row Name 11/17/20 1107          Transfers    Comment (Transfers)  No transfers attempted today due to impaired bed mobility and static sitting balance  -RB     Row Name 11/17/20 1107          Functional Mobility    Functional Mobility- Ind. Level  unable to perform;not tested  -RB     Row Name 11/17/20 1107          Activities of Daily Living    BADL Assessment/Intervention  feeding;grooming  -RB     Row Name 11/17/20 1107          Self-Feeding Assessment/Training    [place] Level (Feeding)  minimum assist (75% patient effort);feeding skills;liquids to mouth;finger foods  -RB     Position (Self-Feeding)  edge of bed sitting  -RB     Comment (Feeding)  cues to keep eyes open and attend to task  -RB     Row Name 11/17/20 1107          Grooming Assessment/Training     [place] Level (Grooming)  grooming skills;wash face, hands;moderate assist (50% patient effort)  -RB     Position (Grooming)  edge of bed sitting  -RB     Comment (Grooming)  Cues to keep eyes opan and attend to task  -RB       User Key  (r) = Recorded By, (t) = Taken By, (c) = Cosigned By    Initials Name Provider Type    Rashmi Glaser OT Occupational Therapist        Obj/Interventions     Row Name 11/17/20 1109          Sensory Assessment (Somatosensory)    Sensory Assessment (Somatosensory)  sensation intact  -RB     Row Name 11/17/20 1109          Vision Assessment/Intervention    Visual Impairment/Limitations  WNL  -RB     Row Name 11/17/20 1109          Range of Motion Comprehensive    General Range of Motion  lower extremity range of motion deficits identified;upper extremity range of motion deficits identified  -RB     Comment, General Range of Motion  Pt with little movement of BLE's. Ankles internally rotated  -RB     Row Name 11/17/20 1109          Strength Comprehensive (MMT)    General Manual Muscle Testing (MMT) Assessment  upper extremity strength deficits identified;lower extremity strength deficits identified  -RB     Comment, General Manual Muscle Testing (MMT) Assessment  BUE/BLE strength impaired. generalized weakness. Difficult to assess full MMT of BUE's due to impaired command following  -RB     Row Name 11/17/20 1109          Balance    Balance Assessment  sitting static balance;sitting dynamic balance  -RB     Static Sitting Balance  moderate impairment  -RB     Dynamic Sitting Balance  severe impairment  -RB     Comment, Balance  Great difficulty with balance today at EOB. R lateral and posterior lean.  -RB       User Key  (r) = Recorded By, (t) = Taken By, (c) = Cosigned By    Initials Name Provider Type    Rashmi Glaser OT Occupational Therapist        Goals/Plan     Row Name 11/17/20 1113          Bed Mobility Goal 1 (OT)    Activity/Assistive Device (Bed Mobility  Goal 1, OT)  bed mobility activities, all  -RB     [place] Level/Cues Needed (Bed Mobility Goal 1, OT)  moderate assist (50-74% patient effort)  -RB     Time Frame (Bed Mobility Goal 1, OT)  short term goal (STG);2 weeks  -RB     Progress/Outcomes (Bed Mobility Goal 1, OT)  continuing progress toward goal  -RB     Row Name 11/17/20 1113          Transfer Goal 1 (OT)    Activity/Assistive Device (Transfer Goal 1, OT)  transfers, all  -RB     [place] Level/Cues Needed (Transfer Goal 1, OT)  moderate assist (50-74% patient effort)  -RB     Time Frame (Transfer Goal 1, OT)  short term goal (STG);2 weeks  -RB     Progress/Outcome (Transfer Goal 1, OT)  continuing progress toward goal  -RB     Row Name 11/17/20 1113          Bathing Goal 1 (OT)    Activity/Device (Bathing Goal 1, OT)  bathing skills, all  -RB     [place] Level/Cues Needed (Bathing Goal 1, OT)  moderate assist (50-74% patient effort)  -RB     Time Frame (Bathing Goal 1, OT)  short term goal (STG);2 weeks  -RB     Progress/Outcomes (Bathing Goal 1, OT)  continuing progress toward goal  -RB     Row Name 11/17/20 1113          Dressing Goal 1 (OT)    Activity/Device (Dressing Goal 1, OT)  dressing skills, all  -RB     [place]/Cues Needed (Dressing Goal 1, OT)  moderate assist (50-74% patient effort)  -RB     Time Frame (Dressing Goal 1, OT)  short term goal (STG);2 weeks  -RB     Progress/Outcome (Dressing Goal 1, OT)  continuing progress toward goal  -RB     Row Name 11/17/20 1113          Grooming Goal 1 (OT)    Activity/Device (Grooming Goal 1, OT)  grooming skills, all  -RB     [place] (Grooming Goal 1, OT)  set-up required  -RB     Time Frame (Grooming Goal 1, OT)  short term goal (STG);2 weeks  -RB     Progress/Outcome (Grooming Goal 1, OT)  continuing progress toward goal  -RB     Row Name 11/17/20 1113          Self-Feeding Goal 1 (OT)    Activity/Device (Self-Feeding Goal 1, OT)  self-feeding skills, all  -RB     [place]  Level/Cues Needed (Self-Feeding Goal 1, OT)  set-up required  -RB     Time Frame (Self-Feeding Goal 1, OT)  short term goal (STG);2 weeks  -RB     Progress/Outcomes (Self-Feeding Goal 1, OT)  continuing progress toward goal  -RB     Row Name 11/17/20 1113          Therapy Assessment/Plan (OT)    Planned Therapy Interventions (OT)  activity tolerance training;adaptive equipment training;BADL retraining;cognitive/visual perception retraining;functional balance retraining;neuromuscular control/coordination retraining;occupation/activity based interventions;patient/caregiver education/training;ROM/therapeutic exercise;strengthening exercise;transfer/mobility retraining  -RB       User Key  (r) = Recorded By, (t) = Taken By, (c) = Cosigned By    Initials Name Provider Type    RB Rashmi Mendez OT Occupational Therapist        Clinical Impression     Row Name 11/17/20 1112          Pain Assessment    Additional Documentation  Pain Scale: FACES Pre/Post-Treatment (Group)  -RB     Row Name 11/17/20 1112          Pain Scale: Numbers Pre/Post-Treatment    Pain Intervention(s)  Repositioned;Rest  -RB     Row Name 11/17/20 1112          Pain Scale: FACES Pre/Post-Treatment    Pain: FACES Scale, Pretreatment  4-->hurts little more  -RB     Posttreatment Pain Rating  4-->hurts little more  -RB     Pain Location - Side  Right  -RB     Pain Location  ankle  -RB     Row Name 11/17/20 1112          Plan of Care Review    Progress  no change  -RB     Row Name 11/17/20 1112          Therapy Assessment/Plan (OT)    Rehab Potential (OT)  fair, will monitor progress closely  -RB     Criteria for Skilled Therapeutic Interventions Met (OT)  yes;skilled treatment is necessary  -RB     Therapy Frequency (OT)  3 times/wk  -RB     Row Name 11/17/20 1112          Therapy Plan Review/Discharge Plan (OT)    Anticipated Discharge Disposition (OT)  home with 24/7 care;home with home health;skilled nursing facility  -RB     Row Name 11/17/20 1112           Vital Signs    Pretreatment Heart Rate (beats/min)  98  -RB     Intratreatment Heart Rate (beats/min)  140  -RB     Posttreatment Heart Rate (beats/min)  115  -RB     O2 Delivery Pre Treatment  room air  -RB     O2 Delivery Intra Treatment  room air  -RB     O2 Delivery Post Treatment  room air  -RB     Pre Patient Position  Supine  -RB     Intra Patient Position  Sitting  -RB     Post Patient Position  Supine  -RB     Row Name 11/17/20 1112          Positioning and Restraints    Pre-Treatment Position  in bed  -RB     Post Treatment Position  bed  -RB     In Bed  notified nsg;supine;fowlers;call light within reach;encouraged to call for assist;exit alarm on;SCD pump applied;R waffle boot;L waffle boot  -RB       User Key  (r) = Recorded By, (t) = Taken By, (c) = Cosigned By    Initials Name Provider Type    Rashmi Glaser OT Occupational Therapist        Outcome Measures     Row Name 11/17/20 1114          How much help from another is currently needed...    Putting on and taking off regular lower body clothing?  1  -RB     Bathing (including washing, rinsing, and drying)  1  -RB     Toileting (which includes using toilet bed pan or urinal)  1  -RB     Putting on and taking off regular upper body clothing  1  -RB     Taking care of personal grooming (such as brushing teeth)  2  -RB     Eating meals  2  -RB     AM-PAC 6 Clicks Score (OT)  8  -RB     Row Name 11/17/20 1114          Functional Assessment    Outcome Measure Options  AM-PAC 6 Clicks Daily Activity (OT)  -RB       User Key  (r) = Recorded By, (t) = Taken By, (c) = Cosigned By    Initials Name Provider Type    Rashmi Glaser OT Occupational Therapist        Occupational Therapy Education                 Title: PT OT SLP Therapies (In Progress)     Topic: Occupational Therapy (Not Started)     Point: ADL training (Not Started)     Description:   Instruct learner(s) on proper safety adaptation and remediation techniques during self  care or transfers.   Instruct in proper use of assistive devices.              Learner Progress:  Not documented in this visit.          Point: Home exercise program (Not Started)     Description:   Instruct learner(s) on appropriate technique for monitoring, assisting and/or progressing therapeutic exercises/activities.              Learner Progress:  Not documented in this visit.          Point: Precautions (Not Started)     Description:   Instruct learner(s) on prescribed precautions during self-care and functional transfers.              Learner Progress:  Not documented in this visit.          Point: Body mechanics (Not Started)     Description:   Instruct learner(s) on proper positioning and spine alignment during self-care, functional mobility activities and/or exercises.              Learner Progress:  Not documented in this visit.                          OT Recommendation and Plan  Planned Therapy Interventions (OT): activity tolerance training, adaptive equipment training, BADL retraining, cognitive/visual perception retraining, functional balance retraining, neuromuscular control/coordination retraining, occupation/activity based interventions, patient/caregiver education/training, ROM/therapeutic exercise, strengthening exercise, transfer/mobility retraining  Therapy Frequency (OT): 3 times/wk  Plan of Care Review  Progress: no change     Time Calculation:   Time Calculation- OT     Row Name 11/17/20 1105             Time Calculation- OT    OT Start Time  1031  -RB      OT Stop Time  1050  -RB      OT Time Calculation (min)  19 min  -RB      Total Timed Code Minutes- OT  9 minute(s)  -RB      OT Received On  11/17/20  -RB      OT - Next Appointment  11/18/20  -RB      OT Goal Re-Cert Due Date  12/01/20  -RB        User Key  (r) = Recorded By, (t) = Taken By, (c) = Cosigned By    Initials Name Provider Type    Rashmi Glaser OT Occupational Therapist        Therapy Charges for Today     Code  Description Service Date Service Provider Modifiers Qty    [id number]  OT EVAL MOD COMPLEXITY 2 11/17/2020 Rashmi Mendez OT GO 1    [id number]  OT THERAPEUTIC ACT EA 15 MIN 11/17/2020 Rashmi Mendez OT GO 1               Rashmi Mendez OT  11/17/2020

## 2020-11-17 NOTE — PLAN OF CARE
Pt admitted to [facility] 2/2 to AMS. Pt found to have a UTI. Per charts and pt report, she lives with family members and requires assist for ADL's and transfers. Assist level not clear this morning due to pt's confusion and no family at bedside to provide information. Pt oriented to self only. Word finding deficits problematic for pt at times. Pt completed a transfer to EOB with Max A x1. Once sitting at EOB, a fluctuation of assist levels provided for balance, CGA->Mod A due to R lateral/posterior lean. Pt completed grooming task with Min A. Total A LB dressing. Pt not safe to attempt standing with OT due to sitting balance deficits. HR reached 140bpm and Max A provided to return to supine. OT recommends continued skilled inpatient OT services with a d/c back home with 24/7 vs. SNF pending progress and more information learned on her independence level at home.     Patient was not wearing a face mask during this therapy encounter. Therapist used appropriate personal protective equipment including mask, goggles and gloves.  Mask used was standard procedure mask. Appropriate PPE was worn during the entire therapy session. Hand hygiene was completed before and after therapy session. Patient is not in enhanced droplet precautions.

## 2020-11-17 NOTE — THERAPY EVALUATION
Patient Name: Christianne Ordonez  : 1929    MRN: [id number]                              Today's Date: 2020       Admit Date: 11/15/2020    Visit Dx:     ICD-10-CM ICD-9-CM   1. Altered mental status, unspecified altered mental status type  R41.82 780.97   2. 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[facility])   • Closed displaced transverse fracture of left patella   • Dementia without behavioral disturbance (CMS/[facility])   • Mixed hyperlipidemia   • Cerebrovascular accident (CVA) due to embolism of left middle cerebral artery (CMS/[facility])   • Closed fracture of right hip (CMS/[facility])   • Altered mental status     Past Medical History:   Diagnosis Date   • Aphasia    • Arthritis    • Atrial fibrillation (CMS/[facility])    • Cerebral atherosclerosis    • Depression    • Disease of thyroid gland    • Insomnia    • Stroke (CMS/[facility])      Past Surgical History:   Procedure Laterality Date   • KNEE SURGERY     • PATELLA OPEN REDUCTION INTERNAL FIXATION Left 2019    Procedure: PATELLA OPEN REDUCTION INTERNAL FIXATION;  Surgeon: Oleg David MD;  Location: [facility];  Service: Orthopedics   • TOTAL HIP ARTHROPLASTY Right 2020    Procedure: Anterior HIP BIPOLAR CEMENTED;  Surgeon: Marcos Jean MD;  Location: [facility];  Service: Orthopedics;  Laterality: Right;     General Information     Row Name 20 1144          Physical Therapy Time and Intention    Document Type  evaluation Pt. admitted with increased AMS;  Pt. has a h/o baseline dementia  -MS     Mode of Treatment  physical therapy;individual therapy  -MS     Row Name 20 1144          General Information    Patient Profile Reviewed  yes  -MS     Prior Level of Function  independent: With use of Rwx for ambulation per family report  -MS     Existing  Precautions/Restrictions  (S) fall Exit alarm  -MS     Barriers to Rehab  cognitive status  -MS     Row Name 11/17/20 1144          Cognition    Orientation Status (Cognition)  disoriented to;place;time  -MS       User Key  (r) = Recorded By, (t) = Taken By, (c) = Cosigned By    Initials Name Provider Type    Andrea Jasmine HILDA, PT Physical Therapist        Mobility     Row Name 11/17/20 1145          Bed Mobility    Bed Mobility  supine-sit;sit-supine  -MS     Supine-Sit Gray (Bed Mobility)  moderate assist (50% patient effort);2 person assist  -MS     Sit-Supine Gray (Bed Mobility)  moderate assist (50% patient effort);2 person assist  -MS     Assistive Device (Bed Mobility)  draw sheet  -MS     Comment (Bed Mobility)  Once sitting EOB, pt. requires Min assist x 1 for static sitting balance and Mod. assist x 1 for dynamic sitting balance.  (Posterior lean)  -MS     Row Name 11/17/20 1145          Transfers    Comment (Transfers)  Performed sit <-> stand transfers x 2 for functional strength training.  Bilateral feet/knees blocked for safety.  Right sided lean throughout.  -MS     Row Name 11/17/20 1145          Sit-Stand Transfer    Sit-Stand Gray (Transfers)  moderate assist (50% patient effort);2 person assist  -MS     Assistive Device (Sit-Stand Transfers)  -- HHA x 2  -MS       User Key  (r) = Recorded By, (t) = Taken By, (c) = Cosigned By    Initials Name Provider Type    Andrea Jasmine HILDA, PT Physical Therapist        Obj/Interventions     Row Name 11/17/20 1147          Range of Motion Comprehensive    Comment, General Range of Motion  LUE/LE (WFL's);  RUE/LE (Imp. 50%)  -MS     Row Name 11/17/20 1147          Strength Comprehensive (MMT)    Comment, General Manual Muscle Testing (MMT) Assessment  LUE/LE (3/5); RUE/LE (3-/5)  -MS       User Key  (r) = Recorded By, (t) = Taken By, (c) = Cosigned By    Initials Name Provider Type    Andrea Jasmine HILDA, PT Physical Therapist         Goals/Plan     [facility] Name 11/17/20 1148          Bed Mobility Goal 1 (PT)    Activity/Assistive Device (Bed Mobility Goal 1, PT)  bed mobility activities, all  -MS     [place] Level/Cues Needed (Bed Mobility Goal 1, PT)  minimum assist (75% or more patient effort)  -MS     Time Frame (Bed Mobility Goal 1, PT)  long term goal (LTG);1 week  -MS     [facility] Name 11/17/20 1148          Transfer Goal 1 (PT)    Activity/Assistive Device (Transfer Goal 1, PT)  sit-to-stand/stand-to-sit;bed-to-chair/chair-to-bed;walker, rolling  -MS     [place] Level/Cues Needed (Transfer Goal 1, PT)  minimum assist (75% or more patient effort)  -MS     Time Frame (Transfer Goal 1, PT)  long term goal (LTG);1 week  -MS     [facility] Name 11/17/20 1148          Gait Training Goal 1 (PT)    Activity/Assistive Device (Gait Training Goal 1, PT)  gait (walking locomotion);walker, rolling  -MS     [place] Level (Gait Training Goal 1, PT)  minimum assist (75% or more patient effort);2 person assist  -MS     Distance (Gait Training Goal 1, PT)  20 feet  -MS     Time Frame (Gait Training Goal 1, PT)  long term goal (LTG);1 week  -MS       User Key  (r) = Recorded By, (t) = Taken By, (c) = Cosigned By    Initials Name Provider Type    Andrea Jasmine HILDA, PT Physical Therapist        Clinical Impression     Row Name 11/17/20 1147          Pain    Additional Documentation  Pain Scale: Numbers Pre/Post-Treatment (Group);Pain Scale: FACES Pre/Post-Treatment (Group)  -MS     [facility] Name 11/17/20 1147          Pain Scale: Numbers Pre/Post-Treatment    Pretreatment Pain Rating  0/10 - no pain  -MS     Posttreatment Pain Rating  0/10 - no pain  -MS     [facility] Name 11/17/20 1147          Pain Scale: FACES Pre/Post-Treatment    Pain: FACES Scale, Pretreatment  0-->no hurt  -MS     Posttreatment Pain Rating  0-->no hurt  -MS     Row Name 11/17/20 1147          Plan of Care Review    Plan of Care Reviewed With  patient  -MS     [facility] Name 11/17/20 1141           Therapy Assessment/Plan (PT)    Rehab Potential (PT)  good, to achieve stated therapy goals  -MS     Criteria for Skilled Interventions Met (PT)  skilled treatment is necessary  -MS     Row Name 11/17/20 1147          Positioning and Restraints    Pre-Treatment Position  in bed  -MS     Post Treatment Position  bed  -MS     In Bed  notified nsg;supine;call light within reach;encouraged to call for assist;exit alarm on;RUE elevated;with family/caregiver;waffle boots/both;SCD pump applied All lines intact.  -MS       User Key  (r) = Recorded By, (t) = Taken By, (c) = Cosigned By    Initials Name Provider Type    Andrea Jasmine, PT Physical Therapist        Outcome Measures     Row Name 11/17/20 1149          How much help from another person do you currently need...    Turning from your back to your side while in flat bed without using bedrails?  2  -MS     Moving from lying on back to sitting on the side of a flat bed without bedrails?  2  -MS     Moving to and from a bed to a chair (including a wheelchair)?  2  -MS     Standing up from a chair using your arms (e.g., wheelchair, bedside chair)?  2  -MS     Climbing 3-5 steps with a railing?  1  -MS     To walk in hospital room?  1  -MS     AM-PAC 6 Clicks Score (PT)  10  -MS     Row Name 11/17/20 1149          Functional Assessment    Outcome Measure Options  AM-PAC 6 Clicks Basic Mobility (PT)  -MS       User Key  (r) = Recorded By, (t) = Taken By, (c) = Cosigned By    Initials Name Provider Type    Andrea Jasmine, PT Physical Therapist        Physical Therapy Education                 Title: PT OT SLP Therapies (In Progress)     Topic: Physical Therapy (In Progress)     Point: Mobility training (In Progress)     Learning Progress Summary           Patient Acceptance, E,D, NR by MS at 11/17/2020 1149                   Point: Home exercise program (In Progress)     Learning Progress Summary           Patient Acceptance, E,D, NR by MS at 11/17/2020 1149                    Point: Body mechanics (In Progress)     Learning Progress Summary           Patient Acceptance, E,D, NR by MS at 11/17/2020 1149                   Point: Precautions (In Progress)     Learning Progress Summary           Patient Acceptance, E,D, NR by MS at 11/17/2020 1149                               User Key     Initials Effective Dates Name Provider Type Discipline    MS 04/03/18 -  Andrea Newby PT Physical Therapist PT              PT Recommendation and Plan  Planned Therapy Interventions (PT): balance training, bed mobility training, gait training, home exercise program, patient/family education, postural re-education, transfer training, strengthening, ROM (range of motion)  Plan of Care Reviewed With: patient  Outcome Summary: Pt. is a 89 year old Female admitted to the hospital with increased A.M.S.  Pt. with a h/o dementia per chart review.  Per family, prior to 4 days pre-admission pt. was independent with functional mobility and use of a Rwx for ambulation.  Pt. currently presents with decreased strength, decreased ROM, decreased balance, and decreased tolerance to functional activity. Pt. will benefit from skilled inpt. P.T. to address her functional deficits and to assist pt. in regaining her maximum level of independence with functional mobility.     Time Calculation:   PT Charges     Row Name 11/17/20 1152             Time Calculation    Start Time  1105  -MS      Stop Time  1120  -MS      Time Calculation (min)  15 min  -MS      PT Received On  11/17/20  -MS      PT - Next Appointment  11/18/20  -MS      PT Goal Re-Cert Due Date  11/24/20  -MS         Time Calculation- PT    Total Timed Code Minutes- PT  13 minute(s)  -MS        User Key  (r) = Recorded By, (t) = Taken By, (c) = Cosigned By    Initials Name Provider Type    MS Andrea Newby, PT Physical Therapist        Therapy Charges for Today     Code Description Service Date Service Provider Modifiers Qty    78105028276   PT EVAL MOD COMPLEXITY 2 11/17/2020 Andrea Newby, PT GP 1    18886960886 HC PT THER PROC EA 15 MIN 11/17/2020 Andrea Newby, PT GP 1    36851672126 HC PT THER SUPP EA 15 MIN 11/17/2020 Andrea Newby, PT GP 1          PT G-Codes  Outcome Measure Options: AM-PAC 6 Clicks Basic Mobility (PT)  AM-PAC 6 Clicks Score (PT): 10  AM-PAC 6 Clicks Score (OT): 8    Andrea Newby, PT  11/17/2020

## 2020-11-17 NOTE — PLAN OF CARE
Goal Outcome Evaluation:  Plan of Care Reviewed With: patient  Progress: no change  Outcome Summary: Remains tachycardic, IV abx continue. Pt remains confused but pleasant, refusing food or drink. Will allow mouth to be swabbed. No complaints of pain or discomfort. Will continue to monitor.

## 2020-11-17 NOTE — PLAN OF CARE
Goal Outcome Evaluation:  Plan of Care Reviewed With: patient  Progress: improving  Outcome Summary: No c/o pain or soa. Pt awake and alert, oriented to self only. Eating better today. IV vanc added, trough due 11/19 at 1330. Turn [id number], waffle boots in place. Daughter at bedside for a few hours this morning. Dr. Campbell consulted.

## 2020-11-18 NOTE — PROGRESS NOTES
"Daily progress note    Chief complaint  Doing better  No specific complaints   Family at bedside    History of present illness  89-year-old white female who is well-known to our service with history of severe dementia chronic atrial fibrillation depression hypertension hyperlipidemia hypothyroidism anxiety disorder and gastroesophageal reflux disease who is a nursing home resident presented to [facility] emergency room with altered mental status.  Patient work-up in ER revealed recurrent UTI admit for management.  Patient is demented unable to give any history.  Patient is no respite distress and also according to nursing staff no fever cough or increased shortness of breath.  Patient Covid 19 test also came back negative.  Patient admitted for management.  Patient is DNR per her wishes    REVIEW OF SYSTEMS  Unable to perform     PHYSICAL EXAM  Blood pressure 111/65, pulse 105, temperature 98.8 °F (37.1 °C), temperature source Oral, resp. rate 20, height 170.2 cm (67\"), weight 72.6 kg (160 lb 1.6 oz), SpO2 94 %.    Constitutional: No distress.   Head: Normocephalic and atraumatic.   Eyes: Pupils are equal, round, and reactive to light. EOM are normal.   Neck: Normal range of motion. Neck supple.   Cardiovascular: Regular rhythm and normal heart sounds. Tachycardia present.   Pulmonary/Chest: Effort normal and breath sounds normal. No respiratory distress.   Abdominal: Soft. There is no abdominal tenderness. There is no rebound and no guarding.   Musculoskeletal: Normal range of motion.    No edema.   Neurological: She has normal sensation and normal strength. Patient currently nonverbal and unable to answer questions   Skin: Skin is warm and dry. No rash noted.     LAB RESULTS  Lab Results (last 24 hours)     Procedure Component Value Units Date/Time    Urine Culture - Urine, Urine, Catheter [[id number]]  (Abnormal)  (Susceptibility) Collected: 11/15/20 3561    Specimen: Urine, Catheter Updated: 11/18/20 " 1154     Urine Culture >100,000 CFU/mL Enterococcus faecalis    Susceptibility      Enterococcus faecalis     ZA     Ampicillin Susceptible     Levofloxacin Susceptible     Nitrofurantoin Susceptible     Tetracycline Susceptible     Vancomycin Susceptible                    POC Glucose Once [[id number]]  (Normal) Collected: 11/18/20 1100    Specimen: Blood Updated: 11/18/20 1101     Glucose 105 mg/dL     Basic Metabolic Panel [[id number]]  (Abnormal) Collected: 11/18/20 0610    Specimen: Blood Updated: 11/18/20 0724     Glucose 107 mg/dL      BUN 22 mg/dL      Creatinine 0.68 mg/dL      Sodium 142 mmol/L      Potassium 3.8 mmol/L      Chloride 104 mmol/L      CO2 27.4 mmol/L      Calcium 8.9 mg/dL      eGFR Non African Amer 81 mL/min/1.73      BUN/Creatinine Ratio 32.4     Anion Gap 10.6 mmol/L     Narrative:      GFR Normal >60  Chronic Kidney Disease <60  Kidney Failure <15      CBC & Differential [[id number]]  (Normal) Collected: 11/18/20 0610    Specimen: Blood Updated: 11/18/20 0632    Narrative:      The following orders were created for panel order CBC & Differential.  Procedure                               Abnormality         Status                     ---------                               -----------         ------                     CBC Auto Differential[[id number]]        Normal              Final result                 Please view results for these tests on the individual orders.    CBC Auto Differential [[id number]]  (Normal) Collected: 11/18/20 0610    Specimen: Blood Updated: 11/18/20 0632     WBC 10.53 10*3/mm3      RBC 4.24 10*6/mm3      Hemoglobin 12.5 g/dL      Hematocrit 37.9 %      MCV 89.4 fL      MCH 29.5 pg      MCHC 33.0 g/dL      RDW 15.4 %      RDW-SD 50.4 fl      MPV 10.2 fL      Platelets 271 10*3/mm3      Neutrophil % 64.9 %      Lymphocyte % 22.6 %      Monocyte % 8.5 %      Eosinophil % 2.8 %      Basophil % 0.9 %      Immature Grans % 0.3 %      Neutrophils, Absolute 6.83 10*3/mm3       Lymphocytes, Absolute 2.38 10*3/mm3      Monocytes, Absolute 0.89 10*3/mm3      Eosinophils, Absolute 0.30 10*3/mm3      Basophils, Absolute 0.10 10*3/mm3      Immature Grans, Absolute 0.03 10*3/mm3      nRBC 0.0 /100 WBC         Imaging Results (Last 24 Hours)     ** No results found for the last 24 hours. **           ECG 12 Lead               HEART RATE= 100  bpm  RR Interval= 600  ms  NM Interval=   ms  P Horizontal Axis=   deg  P Front Axis=   deg  QRSD Interval= 78  ms  QT Interval= 362  ms  QRS Axis= -35  deg  T Wave Axis= 76  deg  - ABNORMAL ECG -  Atrial fibrillation  Left axis deviation  Low voltage, precordial leads  Nonspecific T abnormalities, lateral leads  Atrial fibrillation new vs previous ecg             Current Facility-Administered Medications:   •  apixaban (ELIQUIS) tablet 2.5 mg, 2.5 mg, Oral, BID, Fortino Moreno MD, 2.5 mg at 11/18/20 0904  •  atorvastatin (LIPITOR) tablet 10 mg, 10 mg, Oral, Nightly, Fortino Moreno MD, 10 mg at 11/17/20 1913  •  carvedilol (COREG) tablet 3.125 mg, 3.125 mg, Oral, BID With Meals, Fortino Moreno MD, 3.125 mg at 11/18/20 0903  •  cefTRIAXone (ROCEPHIN) IVPB 1 g, 1 g, Intravenous, Q24H, Frotino Moreno MD, Last Rate: 100 mL/hr at 11/18/20 0036, 1 g at 11/18/20 0036  •  donepezil (ARICEPT) tablet 10 mg, 10 mg, Oral, Nightly, Fortino Moreno MD, 10 mg at 11/17/20 1913  •  doxepin (SINEquan) capsule 25 mg, 25 mg, Oral, Nightly, Fortino Moreno MD, 25 mg at 11/17/20 1913  •  levothyroxine (SYNTHROID, LEVOTHROID) tablet 75 mcg, 75 mcg, Oral, Daily, Fortino Moreno MD, 75 mcg at 11/18/20 0903  •  LORazepam (ATIVAN) tablet 0.5 mg, 0.5 mg, Oral, Q6H PRN, Fortino Moreno MD  •  pantoprazole (PROTONIX) EC tablet 40 mg, 40 mg, Oral, Q AM, Fortino Moreno MD, 40 mg at 11/17/20 0550  •  PARoxetine (PAXIL) tablet 20 mg, 20 mg, Oral, Daily, Fortino Moreno MD, 20 mg at 11/18/20 0903  •  Pharmacy to dose vancomycin, , Does not apply, Continuous PRN, Fortino Moreno MD  •  QUEtiapine  (SEROquel) tablet 25 mg, 25 mg, Oral, Nightly, Emily Moreno MD, 25 mg at 11/17/20 1913  •  sodium chloride 0.9 % flush 10 mL, 10 mL, Intravenous, PRN, Chilo Hall MD  •  vancomycin 1250 mg/250 mL 0.9% NS IVPB ([facility]), 1,250 mg, Intravenous, Q24H, Emily Moreno MD     ASSESSMENT  Acute Enterococcus UTI  Severe dementia  Chronic atrial fibrillation on Eliquis  Hypertension  Hyperlipidemia  Hypothyroidism  Anxiety disorder  Depression  Osteoarthritis  Gastroesophageal reflux disease    PLAN  CPM  IV antibiotics per infectious disease  Adjust nursing home medications  Stress ulcer DVT prophylaxis  Supportive care  DNR  PT/OT  Discussed with nursing staff and family  Follow closely further recommendation according to hospital course    EMILY MORENO MD

## 2020-11-18 NOTE — PROGRESS NOTES
"  Infectious Diseases Progress Note    Wilian Campbell MD     [facility]  [place]: 2 days  Patient Identification:  Name: Christianne Ordonez  Age: 89 y.o.  Sex: female  :  1929  MRN: [id number]         Primary Care Physician: Ray Torre MD            Subjective: Feeling somewhat better denies any specific complaints.  Interval History: See consultation note.    Objective:    Scheduled Meds:apixaban, 2.5 mg, Oral, BID  atorvastatin, 10 mg, Oral, Nightly  carvedilol, 3.125 mg, Oral, BID With Meals  cefTRIAXone, 1 g, Intravenous, Q24H  donepezil, 10 mg, Oral, Nightly  doxepin, 25 mg, Oral, Nightly  levothyroxine, 75 mcg, Oral, Daily  pantoprazole, 40 mg, Oral, Q AM  PARoxetine, 20 mg, Oral, Daily  QUEtiapine, 25 mg, Oral, Nightly  vancomycin, 1,250 mg, Intravenous, Q24H      Continuous Infusions:Pharmacy to dose vancomycin,         Vital signs in last 24 hours:  Temp:  [98.3 °F (36.8 °C)-98.9 °F (37.2 °C)] 98.9 °F (37.2 °C)  Heart Rate:  [] 92  Resp:  [12-20] 12  BP: (103-117)/(63-74) 117/72    Intake/Output:    Intake/Output Summary (Last 24 hours) at 2020 0754  Last data filed at 2020 0345  Gross per 24 hour   Intake 500 ml   Output --   Net 500 ml       Exam:  /72 (BP Location: Right arm, Patient Position: Lying)   Pulse 92   Temp 98.9 °F (37.2 °C) (Oral)   Resp 12   Ht 170.2 cm (67\")   Wt 72.6 kg (160 lb 1.6 oz)   SpO2 92%   BMI 25.08 kg/m²   Patient is examined using the personal protective equipment as per guidelines from infection control for this particular patient as enacted.  Hand washing was performed before and after patient interaction.  General Appearance:    Alert, cooperative, no distress, AAOx3                          Head:    Normocephalic, without obvious abnormality, atraumatic                           Eyes:    PERRL, conjunctivae/corneas clear, EOM's intact, both eyes                         Throat:   Lips, tongue, gums normal; oral mucosa pink " and moist                           Neck:   Supple, symmetrical, trachea midline, no JVD                         Lungs:    Clear to auscultation bilaterally, respirations unlabored                 Chest Wall:    No tenderness or deformity                          Heart:  S1-S2 regular                  Abdomen:   Soft nontender and obese                 extremities:   Extremities normal, atraumatic, no cyanosis or edema                        Pulses:   Pulses palpable in all extremities                            Skin:   Skin is warm and dry,  no rashes or palpable lesions                  Neurologic: Grossly nonfocal       Data Review:    I reviewed the patient's new clinical results.  Results from last 7 days   Lab Units 11/18/20 0610 11/17/20 0438 11/15/20  2311   WBC 10*3/mm3 10.53 14.13* 13.48*   HEMOGLOBIN g/dL 12.5 13.6 13.6   PLATELETS 10*3/mm3 271 323 260     Results from last 7 days   Lab Units 11/18/20 0610 11/17/20 0438 11/15/20  2311   SODIUM mmol/L 142 140 140   POTASSIUM mmol/L 3.8 4.3 4.5   CHLORIDE mmol/L 104 102 101   CO2 mmol/L 27.4 27.2 27.4   BUN mg/dL 22 18 17   CREATININE mg/dL 0.68 0.63 0.94   CALCIUM mg/dL 8.9 9.1 9.6   GLUCOSE mg/dL 107* 93 126*     Microbiology Results (last 10 days)     Procedure Component Value - Date/Time    COVID PRE-OP / PRE-PROCEDURE SCREENING ORDER (NO ISOLATION) - Swab, Nasopharynx [[id number]]  (Normal) Collected: 11/16/20 0038    Lab Status: Final result Specimen: Swab from Nasopharynx Updated: 11/16/20 0207    Narrative:      The following orders were created for panel order COVID PRE-OP / PRE-PROCEDURE SCREENING ORDER (NO ISOLATION) - Swab, Nasopharynx.  Procedure                               Abnormality         Status                     ---------                               -----------         ------                     Respiratory Panel PCR w/...[[id number]]  Normal              Final result                 Please view results for these tests on the  individual orders.    Respiratory Panel PCR w/COVID-19(SARS-CoV-2) [facility]NA/[facility] In-House, NP Swab in UTM/VTM, 3-4 HR TAT - Swab, Nasopharynx [[id number]]  (Normal) Collected: 11/16/20 0038    Lab Status: Final result Specimen: Swab from Nasopharynx Updated: 11/16/20 0207     ADENOVIRUS, PCR Not Detected     Coronavirus 229E Not Detected     Coronavirus HKU1 Not Detected     Coronavirus NL63 Not Detected     Coronavirus OC43 Not Detected     COVID19 Not Detected     Human Metapneumovirus Not Detected     Human Rhinovirus/Enterovirus Not Detected     Influenza A PCR Not Detected     Influenza B PCR Not Detected     Parainfluenza Virus 1 Not Detected     Parainfluenza Virus 2 Not Detected     Parainfluenza Virus 3 Not Detected     Parainfluenza Virus 4 Not Detected     RSV, PCR Not Detected     Bordetella pertussis pcr Not Detected     Bordetella parapertussis PCR Not Detected     Chlamydophila pneumoniae PCR Not Detected     Mycoplasma pneumo by PCR Not Detected    Narrative:      Fact sheet for providers: https://docs.[employer]/wp-content/uploads/JCA7580-8729-LD1.1-EUA-Provider-Fact-Sheet-3.pdf    Fact sheet for patients: https://docs.[employer]/wp-content/uploads/CYK7866-1400-KV4.1-EUA-Patient-Fact-Sheet-1.pdf    Urine Culture - Urine, Urine, Catheter [[id number]]  (Abnormal)  (Susceptibility) Collected: 11/15/20 2322    Lab Status: Final result Specimen: Urine, Catheter Updated: 11/18/20 1154     Urine Culture >100,000 CFU/mL Enterococcus faecalis    Susceptibility      Enterococcus faecalis     ZA     Ampicillin Susceptible     Levofloxacin Susceptible     Nitrofurantoin Susceptible     Tetracycline Susceptible     Vancomycin Susceptible                            Assessment:    Altered mental status   1-toxic metabolic encephalopathy secondary to urinary tract infection with gram-positive cocci finalized as Enterococcus faecalis  2-dementia with previous CVA and expressive aphasia  3-history  of atrial fibrillation  4-hypothyroidism  5-chronic anticoagulation therapy  6-polypharmacy with multiple medications that can further cloud her sensorium - Concomitant use of Paxil, quetiapine, doxepin, Aricept and Ativan.  7-Status post right hip femoral neck fracture for which he has undergone bipolar hemiarthroplasty in June 2020.        Recommendations/Discussions:  Simplify vancomycin to oral ampicillin.  If patient continues to do well patient can be discharged on 5 days of oral ampicillin for Enterococcus UTI/cystitis.  Wilian Campbell MD  11/18/2020  07:54 EST    Much of this encounter note is an electronic transcription/translation of spoken language to printed text. The electronic translation of spoken language may permit erroneous, or at times, nonsensical words or phrases to be inadvertently transcribed; Although I have reviewed the note for such errors, some may still exist

## 2020-11-18 NOTE — PROGRESS NOTES
Continued Stay Note  [facility]     Patient Name: Christianne Ordonez  MRN: [id number]  Today's Date: 11/18/2020    Admit Date: 11/15/2020    Discharge Plan     Row Name 11/18/20 1727       Plan    Plan  Return home with son. Per daughter, they provide 24/7 care for pt. Refusing HH at this time.    Patient/Family in Agreement with Plan  yes    Plan Comments  Met with pt. and daughter at bedside. Explained roll of . Face sheet and pharmacy verified. Pt lives with son. Daughter states her and her brother provide 24/7 care for pt. There are six steps to enter home.  DME equipment includes a wheelchair, O2, nebulizer, shower chair, cane, and rolling walker.  Pt is dependent with ADLs. [facility] in the past. Daughter states they will not need Rehab or HH at D/C. Pt’s PCP is Dr. ANKITA Torre. Uses [facility] on [facility] [facility]. At discharge, family will transport. Explained that CCP would follow to assess for discharge needs.  Scar Isaacs RN-BC        Discharge Codes    No documentation.       Expected Discharge Date and Time     Expected Discharge Date Expected Discharge Time    Nov 21, 2020             Scar Isaacs RN

## 2020-11-18 NOTE — THERAPY TREATMENT NOTE
Patient Name: Christianne Ordonez  : 1929    MRN: [id number]                              Today's Date: 2020       Admit Date: 11/15/2020    Visit Dx:     ICD-10-CM ICD-9-CM   1. Altered mental status, unspecified altered mental status type  R41.82 780.97   2. 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[facility])   • Closed displaced transverse fracture of left patella   • Dementia without behavioral disturbance (CMS/[facility])   • Mixed hyperlipidemia   • Cerebrovascular accident (CVA) due to embolism of left middle cerebral artery (CMS/[facility])   • Closed fracture of right hip (CMS/[facility])   • Altered mental status     Past Medical History:   Diagnosis Date   • Aphasia    • Arthritis    • Atrial fibrillation (CMS/[facility])    • Cerebral atherosclerosis    • Depression    • Disease of thyroid gland    • Insomnia    • Stroke (CMS/[facility])      Past Surgical History:   Procedure Laterality Date   • KNEE SURGERY     • PATELLA OPEN REDUCTION INTERNAL FIXATION Left 2019    Procedure: PATELLA OPEN REDUCTION INTERNAL FIXATION;  Surgeon: Oleg David MD;  Location: [facility];  Service: Orthopedics   • TOTAL HIP ARTHROPLASTY Right 2020    Procedure: Anterior HIP BIPOLAR CEMENTED;  Surgeon: Marcos Jean MD;  Location: [facility];  Service: Orthopedics;  Laterality: Right;     General Information     Row Name 20 1037          Physical Therapy Time and Intention    Document Type  therapy note (daily note)  -MS     Mode of Treatment  physical therapy;individual therapy  -MS     Row Name 20 1037          General Information    Patient Profile Reviewed  yes  -MS     Existing Precautions/Restrictions  (S) fall Exit alarm  -MS       User Key  (r) = Recorded By, (t) = Taken By, (c) = Cosigned By    Initials Name Provider Type    MS Newby  Andrea STEVENS, PT Physical Therapist        Mobility     Row Name 11/18/20 1038          Bed Mobility    Supine-Sit [place] (Bed Mobility)  maximum assist (25% patient effort);2 person assist  -MS     Sit-Supine [place] (Bed Mobility)  maximum assist (25% patient effort);2 person assist  -MS     Assistive Device (Bed Mobility)  draw sheet  -MS     Comment (Bed Mobility)  Once sitting EOB, pt. requires Mod. assist x 1 for bed mobility and Max. assist x 1 for dynamic sitting balance. Posterior lean throughout.  -MS     Row Name 11/18/20 1038          Transfers    Comment (Transfers)  Pt. barely able to clear her bottom off of the bed this date during transfers due to fatigue and weakness.  -MS     Row Name 11/18/20 1038          Sit-Stand Transfer    Sit-Stand [place] (Transfers)  maximum assist (25% patient effort);2 person assist  -MS     Assistive Device (Sit-Stand Transfers)  -- HHA x 2  -MS       User Key  (r) = Recorded By, (t) = Taken By, (c) = Cosigned By    Initials Name Provider Type    Andrea Jasmine PT Physical Therapist        Obj/Interventions     Row Name 11/18/20 1040          Motor Skills    Therapeutic Exercise  -- BLE ther. ex. program completed with assist (Ankle Pumps, Hip Flexion, LAQ's)  -MS       User Key  (r) = Recorded By, (t) = Taken By, (c) = Cosigned By    Initials Name Provider Type    Andrea Jasmine, PT Physical Therapist        Goals/Plan    No documentation.       Clinical Impression     Row Name 11/18/20 1040          Pain Scale: Numbers Pre/Post-Treatment    Pretreatment Pain Rating  0/10 - no pain  -MS     Posttreatment Pain Rating  0/10 - no pain  -MS     Row Name 11/18/20 1040          Pain Scale: FACES Pre/Post-Treatment    Pain: FACES Scale, Pretreatment  0-->no hurt  -MS     Posttreatment Pain Rating  0-->no hurt  -MS     Row Name 11/18/20 1040          Positioning and Restraints    Pre-Treatment Position  in bed  -MS     Post Treatment Position  bed   -MS     In Bed  notified nsg;supine;call light within reach;encouraged to call for assist;exit alarm on;RUE elevated;LUE elevated;waffle boots/both;SCD pump applied All lines intact.  -MS       User Key  (r) = Recorded By, (t) = Taken By, (c) = Cosigned By    Initials Name Provider Type    Andrea Jasmine, PT Physical Therapist        Outcome Measures     Row Name 11/18/20 1041          How much help from another person do you currently need...    Turning from your back to your side while in flat bed without using bedrails?  2  -MS     Moving from lying on back to sitting on the side of a flat bed without bedrails?  2  -MS     Moving to and from a bed to a chair (including a wheelchair)?  2  -MS     Standing up from a chair using your arms (e.g., wheelchair, bedside chair)?  2  -MS     Climbing 3-5 steps with a railing?  1  -MS     To walk in hospital room?  1  -MS     AM-PAC 6 Clicks Score (PT)  10  -MS     Row Name 11/18/20 1041          Functional Assessment    Outcome Measure Options  AM-PAC 6 Clicks Basic Mobility (PT)  -MS       User Key  (r) = Recorded By, (t) = Taken By, (c) = Cosigned By    Initials Name Provider Type    Andrea Jasmine, PT Physical Therapist        Physical Therapy Education                 Title: PT OT SLP Therapies (In Progress)     Topic: Physical Therapy (In Progress)     Point: Mobility training (In Progress)     Learning Progress Summary           Patient Nonacceptance, E,D, NR by MS at 11/18/2020 1042    Acceptance, E,D, NR by MS at 11/17/2020 1149                   Point: Home exercise program (In Progress)     Learning Progress Summary           Patient Nonacceptance, E,D, NR by MS at 11/18/2020 1042    Acceptance, E,D, NR by MS at 11/17/2020 1149                   Point: Body mechanics (In Progress)     Learning Progress Summary           Patient Nonacceptance, E,D, NR by MS at 11/18/2020 1042    Acceptance, E,D, NR by MS at 11/17/2020 1149                   Point:  Precautions (In Progress)     Learning Progress Summary           Patient Nonacceptance, E,D, NR by MS at 11/18/2020 1042    Acceptance, E,D, NR by MS at 11/17/2020 1149                               User Key     Initials Effective Dates Name Provider Type Discipline    MS 04/03/18 -  Andrea Newby, PT Physical Therapist PT              PT Recommendation and Plan  Planned Therapy Interventions (PT): balance training, bed mobility training, gait training, home exercise program, patient/family education, postural re-education, transfer training, strengthening, ROM (range of motion)  Plan of Care Reviewed With: patient  Outcome Summary: Pt. requires Max. assist x 2 for bed mobility and Max. assist x 2 for sit <-> stand transfers.  BLE ther. ex. program x10 reps completed with assist for general strengthening. Verbal/tactile cues for posture correction throughout upright mobility.     Time Calculation:   PT Charges     Row Name 11/18/20 1044             Time Calculation    Start Time  0940  -MS      Stop Time  1000  -MS      Time Calculation (min)  20 min  -MS      PT Received On  11/18/20  -MS      PT - Next Appointment  11/19/20  -MS         Time Calculation- PT    Total Timed Code Minutes- PT  18 minute(s)  -MS        User Key  (r) = Recorded By, (t) = Taken By, (c) = Cosigned By    Initials Name Provider Type    MS Newby, Andrea STEVENS, PT Physical Therapist        Therapy Charges for Today     Code Description Service Date Service Provider Modifiers Qty    93609499220 HC PT EVAL MOD COMPLEXITY 2 11/17/2020 Andrea Newby, PT GP 1    62753757091 HC PT THER PROC EA 15 MIN 11/17/2020 Andrea Newby, PT GP 1    23626821371 HC PT THER SUPP EA 15 MIN 11/17/2020 Andrea Newby, PT GP 1    55723475867 HC PT THER PROC EA 15 MIN 11/18/2020 Andrea Newby, PT GP 1    16356076864 HC PT THER SUPP EA 15 MIN 11/18/2020 Andrea Newby, PT GP 1          PT G-Codes  Outcome Measure Options: AM-PAC 6 Clicks Basic  Mobility (PT)  AM-PAC 6 Clicks Score (PT): 10  AM-PAC 6 Clicks Score (OT): 8    Andrea Newby, PT  11/18/2020

## 2020-11-18 NOTE — PLAN OF CARE
Problem: Adult Inpatient Plan of Care  Goal: Plan of Care Review  Flowsheets (Taken 11/18/2020 104)  Plan of Care Reviewed With: patient  Outcome Summary: Pt. requires Max. assist x 2 for bed mobility and Max. assist x 2 for sit <-> stand transfers.  BLE ther. ex. program x10 reps completed with assist for general strengthening. Verbal/tactile cues for posture correction throughout upright mobility.   Patient was wearing a face mask during this therapy encounter. Therapist used appropriate personal protective equipment including eye protection, mask, and gloves.  Mask used was standard procedure mask. Appropriate PPE was worn during the entire therapy session. Hand hygiene was completed before and after therapy session. Patient is not in enhanced droplet precautions.

## 2020-11-18 NOTE — CONSULTS
CONSULT NOTE    Infectious Diseases - Wilian Alvarado MD  [facility]       Patient Identification:  Name: Christianne Ordonez  Age: 89 y.o.  Sex: female  :  1929  MRN: [id number]             Date of Consultation: 2020      Primary Care Physician: Ray Torre MD                               Requesting Physician: Dr. Moreno  Reason for Consultation: Urosepsis    Impression: Patient is a 89-year-old female with complicated past medical history consisting of previous stroke with resultant aphasia, atrial fibrillation, hypothyroidism and underlying dementia with behavior changes on multiple agents such as Aricept, quetiapine, doxepin, Paxil and Ativan was noted to be confused and altered over the course of last couple of days.  Patient family member has home test kit to test for urinary tract infection and was suspected to have UTI.  Because of the decline in status in terms of progressive confusion and declining functional activities patient was sent to the emergency room.  Work-up did confirm evidence of urinary tract infection, with mild leukocytosis and abnormal urinalysis and negative COVID-19 assay.  CT scan of the head did not reveal any acute intracranial process.  Patient was appropriately started on IV Rocephin while urine culture and blood cultures are awaited.  Patient continued to remain confused despite being on Rocephin and today her urine culture came back positive for greater than 100,000 colonies of gram-positive cocci resulting in change of antibiotics to IV vancomycin and infectious disease consultation.  Patient herself is unable to give much account of her symptoms as she is pleasantly confused.  Despite being on antibiotics she has persistent leukocytosis with no improvement on IV Rocephin.  This presentation is consistent with:  1-toxic metabolic encephalopathy secondary to urinary tract infection with gram-positive cocci likely pathogen complaints from Enterococcus,  strep species as well as staph species such as staph epidermidis with staph aureus.  2-dementia with previous CVA and expressive aphasia  3-history of atrial fibrillation  4-hypothyroidism  5-chronic anticoagulation therapy  6-polypharmacy with multiple medications that can further cloud her sensorium - Concomitant use of Paxil, quetiapine, doxepin, Aricept and Ativan.  7-Status post right hip femoral neck fracture for which he has undergone bipolar hemiarthroplasty in June 2020.      Recommendations/Discussions:  At this juncture I agree with the care plan consisting of switching antibiotics to IV vancomycin while awaiting the final identity of the gram-positive cocci in her urine.  Patient would not need further work-up if the pathogen turned out to be Enterococcus or staph epidermidis or saprophyticus.  But if the pathogen is identified as staph aureus of either MRSA or MSSA type the latter is less likely given the fact that patient was not improving on IV Rocephin and patient would need further work-up for systemic staph aureus infection as simple ascending UTI due to staph aureus is only seen when there is documented  intervention such as catheterization for support.  Patient's underlying dementia and multiple medications and current confusional state precludes exhaustive review of system that is required to identify secondary focus if it turns out to be systemic MRSA infection.  Patient's blood cultures have been negative which makes it less likely on clinical grounds.  Thank you Dr. Chambers for letting me be the part of your patient care please see above impression and recommendations      History of Present Illness:    Patient is a 89-year-old female with complicated past medical history consisting of previous stroke with resultant aphasia, atrial fibrillation, hypothyroidism and underlying dementia with behavior changes on multiple agents such as Aricept, quetiapine, doxepin, Paxil and Ativan was noted to  be confused and altered over the course of last couple of days.  Patient family member has home test kit to test for urinary tract infection and was suspected to have UTI.  Because of the decline in status in terms of progressive confusion and declining functional activities patient was sent to the emergency room.  Work-up did confirm evidence of urinary tract infection, with mild leukocytosis and abnormal urinalysis and negative COVID-19 assay.  CT scan of the head did not reveal any acute intracranial process.  Patient was appropriately started on IV Rocephin while urine culture and blood cultures are awaited.  Patient continued to remain confused despite being on Rocephin and today her urine culture came back positive for greater than 100,000 colonies of gram-positive cocci resulting in change of antibiotics to IV vancomycin and infectious disease consultation.  Patient herself is unable to give much account of her symptoms as she is pleasantly confused.  Despite being on antibiotics she has persistent leukocytosis with no improvement on IV Rocephin.        Past Medical History:  Past Medical History:   Diagnosis Date   • Aphasia    • Arthritis    • Atrial fibrillation (CMS/HCC)    • Cerebral atherosclerosis    • Depression    • Disease of thyroid gland    • Insomnia    • Stroke (CMS/HCC)      Past Surgical History:  Past Surgical History:   Procedure Laterality Date   • KNEE SURGERY     • PATELLA OPEN REDUCTION INTERNAL FIXATION Left 5/27/2019    Procedure: PATELLA OPEN REDUCTION INTERNAL FIXATION;  Surgeon: Oleg David MD;  Location: [facility];  Service: Orthopedics   • TOTAL HIP ARTHROPLASTY Right 6/13/2020    Procedure: Anterior HIP BIPOLAR CEMENTED;  Surgeon: Marcos Jean MD;  Location: [facility];  Service: Orthopedics;  Laterality: Right;      Home Meds:  Medications Prior to Admission   Medication Sig Dispense Refill Last Dose   • apixaban (Eliquis) 2.5 MG tablet tablet Take 1 tablet by  mouth 2 (Two) Times a Day. Patient must make a follow up appointment prior to further refills. 8/25/20 (Patient taking differently: Take 5 mg by mouth 2 (Two) Times a Day. Patient must make a follow up appointment prior to further refills. 8/25/2) 180 tablet 0    • atorvastatin (LIPITOR) 20 MG tablet Take 20 mg by mouth Daily.  3    • carvedilol (COREG) 3.125 MG tablet Take 3.125 mg by mouth 2 (two) times a day with meals.      • doxepin (SINEquan) 25 MG capsule TK 1 C PO Q NIGHT  3    • levothyroxine (SYNTHROID, LEVOTHROID) 75 MCG tablet Take 75 mcg by mouth Daily.      • LORazepam (ATIVAN) 0.5 MG tablet Every Night. 4 tablets at bedtime      • multivitamin with minerals (CENTRUM SILVER 50+WOMEN PO) Take 1 tablet by mouth Daily.      • PARoxetine (PAXIL) 20 MG tablet TAKE ONE TABLET BY MOUTH ONCE DAILY 90 tablet 0    • QUEtiapine (SEROquel) 25 MG tablet Take 25 mg by mouth Every Night. 2 tablets      • saccharomyces boulardii (FLORASTOR) 250 MG capsule Take 250 mg by mouth Daily.      • donepezil (ARICEPT) 10 MG tablet Take 1 tablet by mouth Every Night. 30 tablet 5      Current Meds:     Current Facility-Administered Medications:   •  apixaban (ELIQUIS) tablet 2.5 mg, 2.5 mg, Oral, BID, Fortino Moreno MD, 2.5 mg at 11/17/20 1913  •  atorvastatin (LIPITOR) tablet 10 mg, 10 mg, Oral, Nightly, Fortino Moreno MD, 10 mg at 11/17/20 1913  •  carvedilol (COREG) tablet 3.125 mg, 3.125 mg, Oral, BID With Meals, Fortino Moreno MD, 3.125 mg at 11/17/20 1748  •  cefTRIAXone (ROCEPHIN) IVPB 1 g, 1 g, Intravenous, Q24H, Fortino Moreno MD, Last Rate: 100 mL/hr at 11/17/20 0032, 1 g at 11/17/20 0032  •  donepezil (ARICEPT) tablet 10 mg, 10 mg, Oral, Nightly, Fortino Moreno MD, 10 mg at 11/17/20 1913  •  doxepin (SINEquan) capsule 25 mg, 25 mg, Oral, Nightly, Fortino Moreno MD, 25 mg at 11/17/20 1913  •  levothyroxine (SYNTHROID, LEVOTHROID) tablet 75 mcg, 75 mcg, Oral, Daily, Fortino Moreno MD, 75 mcg at 11/17/20 0842  •  LORazepam  "(ATIVAN) tablet 0.5 mg, 0.5 mg, Oral, Q6H PRN, Fortino Moreno MD  •  pantoprazole (PROTONIX) EC tablet 40 mg, 40 mg, Oral, Q AM, Fortino Moreno MD, 40 mg at 11/17/20 0550  •  PARoxetine (PAXIL) tablet 20 mg, 20 mg, Oral, Daily, Fortino Moreno MD, 20 mg at 11/17/20 0842  •  Pharmacy to dose vancomycin, , Does not apply, Continuous PRN, Fortino Moreno MD  •  QUEtiapine (SEROquel) tablet 25 mg, 25 mg, Oral, Nightly, Fortino Moreno MD, 25 mg at 11/17/20 1913  •  sodium chloride 0.9 % flush 10 mL, 10 mL, Intravenous, PRN, Chilo Hall MD  •  [START ON 11/18/2020] vancomycin 1250 mg/250 mL 0.9% NS IVPB ([facility]), 1,250 mg, Intravenous, Q24H, Fortino Moreno MD  Allergies:  No Known Allergies  Social History:   Social History     Tobacco Use   • Smoking status: Never Smoker   • Smokeless tobacco: Never Used   • Tobacco comment: caffeine use   Substance Use Topics   • Alcohol use: No      Family History:  Family History   Problem Relation Age of Onset   • Cancer Sister    • Migraines Other           Review of Systems  See history of present illness and past medical history.  Limited because of her confusional state.      Vitals:   /63   Pulse 102   Temp 98.3 °F (36.8 °C) (Oral)   Resp 20   Ht 170.2 cm (67\")   Wt 72.6 kg (160 lb 1.6 oz)   SpO2 91%   BMI 25.08 kg/m²   I/O:     Intake/Output Summary (Last 24 hours) at 11/17/2020 2033  Last data filed at 11/17/2020 1922  Gross per 24 hour   Intake 500 ml   Output 250 ml   Net 250 ml     Exam:  Patient is examined using the personal protective equipment as per guidelines from infection control for this particular patient as enacted.  Hand washing was performed before and after patient interaction.  General Appearance:   Awake conversant but confused.   Head:    Normocephalic, without obvious abnormality, atraumatic   Eyes:    PERRL, conjunctivae/corneas clear, EOM's intact, both eyes   Ears:    Normal external ear canals, both ears   Nose:   Nares normal, septum " midline, mucosa normal, no drainage    or sinus tenderness   Throat:   Lips, tongue, gums normal; oral mucosa pink and moist   Neck:   Supple, symmetrical, trachea midline, no adenopathy;     thyroid:  no enlargement/tenderness/nodules; no carotid    bruit or JVD   Back:     Symmetric, no curvature, ROM normal, no CVA tenderness   Lungs:     Clear to auscultation bilaterally, respirations unlabored   Chest Wall:    No tenderness or deformity    Heart:   Irregularly irregular   Abdomen:    Obese soft nontender tomegaly, no splenomegaly   Extremities:   Extremities normal, atraumatic, no cyanosis or edema   Pulses:   Pulses palpable in all extremities; symmetric all extremities   Skin:  Involving pressure changes and bony prominences including right ankle.                   Neurologic:  Confused and disoriented       Data Review:    I reviewed the patient's new clinical results.  Results from last 7 days   Lab Units 11/17/20  0438 11/15/20  2311   WBC 10*3/mm3 14.13* 13.48*   HEMOGLOBIN g/dL 13.6 13.6   PLATELETS 10*3/mm3 323 260     Results from last 7 days   Lab Units 11/17/20  0438 11/15/20  2311   SODIUM mmol/L 140 140   POTASSIUM mmol/L 4.3 4.5   CHLORIDE mmol/L 102 101   CO2 mmol/L 27.2 27.4   BUN mg/dL 18 17   CREATININE mg/dL 0.63 0.94   CALCIUM mg/dL 9.1 9.6   GLUCOSE mg/dL 93 126*     Microbiology Results (last 10 days)     Procedure Component Value - Date/Time    COVID PRE-OP / PRE-PROCEDURE SCREENING ORDER (NO ISOLATION) - Swab, Nasopharynx [[id number]]  (Normal) Collected: 11/16/20 0038    Lab Status: Final result Specimen: Swab from Nasopharynx Updated: 11/16/20 0207    Narrative:      The following orders were created for panel order COVID PRE-OP / PRE-PROCEDURE SCREENING ORDER (NO ISOLATION) - Swab, Nasopharynx.  Procedure                               Abnormality         Status                     ---------                               -----------         ------                     Respiratory Panel  PCR w/...[[id number]]  Normal              Final result                 Please view results for these tests on the individual orders.    Respiratory Panel PCR w/COVID-19(SARS-CoV-2) [facility] In-House, NP Swab in UTM/VTM, 3-4 HR TAT - Swab, Nasopharynx [[id number]]  (Normal) Collected: 11/16/20 0038    Lab Status: Final result Specimen: Swab from Nasopharynx Updated: 11/16/20 0207     ADENOVIRUS, PCR Not Detected     Coronavirus 229E Not Detected     Coronavirus HKU1 Not Detected     Coronavirus NL63 Not Detected     Coronavirus OC43 Not Detected     COVID19 Not Detected     Human Metapneumovirus Not Detected     Human Rhinovirus/Enterovirus Not Detected     Influenza A PCR Not Detected     Influenza B PCR Not Detected     Parainfluenza Virus 1 Not Detected     Parainfluenza Virus 2 Not Detected     Parainfluenza Virus 3 Not Detected     Parainfluenza Virus 4 Not Detected     RSV, PCR Not Detected     Bordetella pertussis pcr Not Detected     Bordetella parapertussis PCR Not Detected     Chlamydophila pneumoniae PCR Not Detected     Mycoplasma pneumo by PCR Not Detected    Narrative:      Fact sheet for providers: https://docs.[employer]ool[employer]/wp-content/uploads/ZMU8023-1939-VN3.1-EUA-Provider-Fact-Sheet-3.pdf    Fact sheet for patients: https://docs.[employer]ool[employer]/wp-content/uploads/MIE1639-2906-WT7.1-EUA-Patient-Fact-Sheet-1.pdf    Urine Culture - Urine, Urine, Catheter [[id number]]  (Abnormal) Collected: 11/15/20 2322    Lab Status: Preliminary result Specimen: Urine, Catheter Updated: 11/17/20 0837     Urine Culture >100,000 CFU/mL Gram Positive Cocci            Assessment:  Active Hospital Problems    Diagnosis  POA   • Altered mental status [R41.82]  Yes      Resolved Hospital Problems   No resolved problems to display.         Plan:  See above  Wilian Campbell MD   11/17/2020  20:33 EST    Much of this encounter note is an electronic transcription/translation of spoken language to printed text. The  electronic translation of spoken language may permit erroneous, or at times, nonsensical words or phrases to be inadvertently transcribed; Although I have reviewed the note for such errors, some may still exist

## 2020-11-18 NOTE — PLAN OF CARE
Goal Outcome Evaluation:  Plan of Care Reviewed With: patient  Progress: improving   Pt more alert today but still confused. Taking some PO. IV abx continue. VSS. Continue to monitor.

## 2020-11-18 NOTE — PLAN OF CARE
Problem: Adult Inpatient Plan of Care  Goal: Plan of Care Review  Outcome: Ongoing, Progressing  Flowsheets (Taken 11/18/2020 7703)  Progress: improving  Plan of Care Reviewed With: patient  Outcome Summary: Pt vitals stable. No c/o pain. Drowsy this am but improved as day progressed. IV abx cont. PT today. Will continue to monitor   Goal Outcome Evaluation:  Plan of Care Reviewed With: patient  Progress: improving  Outcome Summary: Pt vitals stable. No c/o pain. Drowsy this am but improved as day progressed. IV abx cont. PT today. Will continue to monitor

## 2020-11-19 NOTE — DISCHARGE PLACEMENT REQUEST
"Christianne Ordonez (89 y.o. Female)     Date of Birth Social Security Number Address Home Phone MRN    05/08/1929  [id number] [facility] [address] [phone] [id number]    [employer] Status          [employer] orth[employer]        Admission Date Admission Type Admitting Provider Attending Provider Department, Room/Bed    11/15/20 Emergency Fortino Moreno MD Ahmed, Aftab, MD [address], [address]    Discharge Date Discharge Disposition Discharge Destination         Home or Self Care              Attending Provider: Fortino Moreno MD    Allergies: No Known Allergies    Isolation: None   Infection: None   Code Status: No CPR    Ht: 170.2 cm (67\")   Wt: 72.6 kg (160 lb 1.6 oz)    Admission Cmt: None   Principal Problem: None                Active Insurance as of 11/15/2020     Primary Coverage     Payor Plan Insurance Group Employer/Plan Group    MEDICARE MEDICARE A & B      Payor Plan Address Payor Plan Phone Number Payor Plan Fax Number Effective Dates    [address] [phone]  5/1/1994 - None Entered    [facility] [id number]       Subscriber Name Subscriber Birth Date Member ID       CHRISTIANNE ORDONEZ 5/8/1929 [id number]           Secondary Coverage     Payor Plan Insurance Group Employer/Plan Group    [id number]      Payor Plan Address Payor Plan Phone Number Payor Plan Fax Number Effective Dates    [address]   11/1/2020 - None Entered    [place] [address]       Subscriber Name Subscriber Birth Date Member ID       CHRISTIANNE ORDONEZ 5/8/1929 [id number]                 Emergency Contacts      (Rel.) Home Phone Work Phone Mobile Phone    MeryCindy RICKS (Daughter) [phone] -- [phone]    MeryJimi (Son) [phone] -- [phone]    Lenora Montero (Grandchild) [phone] -- [phone]              "

## 2020-11-19 NOTE — DISCHARGE SUMMARY
Discharge summary    Date of admission 11/15/2020  Date of discharge 11/19/2020    Final diagnosis  Acute Enterococcus UTI  Severe dementia  Chronic atrial fibrillation   Hypertension  Hyperlipidemia  Hypothyroidism  Anxiety disorder  Depression  Osteoarthritis  Gastroesophageal reflux disease    Discharge medications    Current Facility-Administered Medications:   •  ampicillin (PRINCIPEN) capsule 500 mg, 500 mg, Oral, Q6H, Wilian Campbell MD, 500 mg at 11/19/20 1142  •  apixaban (ELIQUIS) tablet 2.5 mg, 2.5 mg, Oral, BID, Fortino Moreno MD, 2.5 mg at 11/19/20 0914  •  atorvastatin (LIPITOR) tablet 10 mg, 10 mg, Oral, Nightly, Fortino Moreno MD, 10 mg at 11/18/20 2305  •  carvedilol (COREG) tablet 6.25 mg, 6.25 mg, Oral, BID With Meals, Fortino Moreno MD, 6.25 mg at 11/19/20 0914  •  donepezil (ARICEPT) tablet 10 mg, 10 mg, Oral, Nightly, Fortino Moreno MD, 10 mg at 11/18/20 2305  •  doxepin (SINEquan) capsule 25 mg, 25 mg, Oral, Nightly, Fortino Moreno MD, 25 mg at 11/18/20 2305  •  levothyroxine (SYNTHROID, LEVOTHROID) tablet 50 mcg, 50 mcg, Oral, Daily, Fortino Moreno MD, 50 mcg at 11/19/20 0914  •  pantoprazole (PROTONIX) EC tablet 40 mg, 40 mg, Oral, Q AM, Fortino Moreno MD, 40 mg at 11/19/20 0619  •  PARoxetine (PAXIL) tablet 20 mg, 20 mg, Oral, Daily, Fortino Moreno MD, 20 mg at 11/19/20 0914  •  QUEtiapine (SEROquel) tablet 25 mg, 25 mg, Oral, Nightly, Fortino Moreno MD, 25 mg at 11/18/20 2306     Consults obtained  Infectious disease    Procedures  None    Hospital course  89-year white female with history of severe dementia chronic atrial fibrillation on Eliquis hypertension hyperlipidemia hypothyroidism admitted to emergency with altered mental status.  Patient work-up revealed UTI admit for management.  Patient admitted treated with empiric antibiotics after obtaining the cultures and her cultures came back positive for Enterococcus.  Patient remain on IV vancomycin and then switched to oral ampicillin and will  discharge home with 5 more days.  Patient is back to baseline and family wants to take her home.  Patient remained DNR throughout hospital course    Discharge diet regular    Activity as tolerated     Medications as above    Follow-up with primary doctor in 1 week and take medication as directed    EMILY HERNANDEZ MD

## 2020-11-19 NOTE — THERAPY TREATMENT NOTE
Patient Name: Christianne Ordonez  : 1929    MRN: [id number]                              Today's Date: 2020       Admit Date: 11/15/2020    Visit Dx:     ICD-10-CM ICD-9-CM   1. Altered mental status, unspecified altered mental status type  R41.82 780.97   2. UTI (urinary tract infection), bacterial  N39.0 599.0    A49.9 041.9     Patient Active Problem List   Diagnosis   • Acquired aphasia   • A-fib (CMS/[facility])   • Atherosclerotic cerebrovascular disease   • Apoplectic attack (CMS/[facility])   • Closed displaced transverse fracture of left patella   • Dementia without behavioral disturbance (CMS/[facility])   • Mixed hyperlipidemia   • Cerebrovascular accident (CVA) due to embolism of left middle cerebral artery (CMS/[facility])   • Closed fracture of right hip (CMS/[facility])   • Altered mental status     Past Medical History:   Diagnosis Date   • Aphasia    • Arthritis    • Atrial fibrillation (CMS/[facility])    • Cerebral atherosclerosis    • Depression    • Disease of thyroid gland    • Insomnia    • Stroke (CMS/[facility])      Past Surgical History:   Procedure Laterality Date   • KNEE SURGERY     • PATELLA OPEN REDUCTION INTERNAL FIXATION Left 2019    Procedure: PATELLA OPEN REDUCTION INTERNAL FIXATION;  Surgeon: Oleg David MD;  Location: [facility];  Service: Orthopedics   • TOTAL HIP ARTHROPLASTY Right 2020    Procedure: Anterior HIP BIPOLAR CEMENTED;  Surgeon: Marcos Jean MD;  Location: [facility];  Service: Orthopedics;  Laterality: Right;     General Information     Row Name 20 1121          Physical Therapy Time and Intention    Document Type  therapy note (daily note)  -MS     Mode of Treatment  physical therapy;individual therapy  -MS     Row Name 20 105          General Information    Patient Profile Reviewed  yes  -MS     Existing Precautions/Restrictions  (S) fall Exit alarm  -MS     Row Name 20 4552          Safety Issues, Functional Mobility    Comment, Safety Issues/Impairments  (Mobility)  Gait belt used for safety.  -MS       User Key  (r) = Recorded By, (t) = Taken By, (c) = Cosigned By    Initials Name Provider Type    Andrea Jasmine, PT Physical Therapist        Mobility     Row Name 11/19/20 1055          Bed Mobility    Supine-Sit [place] (Bed Mobility)  moderate assist (50% patient effort);2 person assist  -MS     Sit-Supine [place] (Bed Mobility)  moderate assist (50% patient effort);2 person assist  -MS     Assistive Device (Bed Mobility)  draw sheet  -MS     Comment (Bed Mobility)  Once sitting EOB, pt. requires CGA x 1 for static sitting balance and Min. assist x 1 for dynamic sitting balance.  Occ. posterior lean but able to assist in correction with verbal cueing.  -MS     Row Name 11/19/20 1055          Transfers    Comment (Transfers)  Performed sit <-> stand transfers x 3 at bedside for functional strength training. Bilateral feet/knees blocked for safety.  Pt. able to clear her bottom off of the bed with each stance but requires Max. verbal/tactile cues for posture correction.  -MS     Row Name 11/19/20 1055          Sit-Stand Transfer    Sit-Stand [place] (Transfers)  moderate assist (50% patient effort);2 person assist  -MS     Assistive Device (Sit-Stand Transfers)  -- HHA x 2 and use of gait belt  -MS       User Key  (r) = Recorded By, (t) = Taken By, (c) = Cosigned By    Initials Name Provider Type    Andrea Jasmine PT Physical Therapist        Obj/Interventions     Row Name 11/19/20 1057          Motor Skills    Therapeutic Exercise  -- BLE ther. ex. program x 10 reps completed with assist (Ankle Pumps, Hip Flexion, LAQ's)  -MS       User Key  (r) = Recorded By, (t) = Taken By, (c) = Cosigned By    Initials Name Provider Type    Andrea Jasmine PT Physical Therapist        Goals/Plan    No documentation.       Clinical Impression     Row Name 11/19/20 1058          Pain Scale: Numbers Pre/Post-Treatment    Pretreatment Pain Rating   0/10 - no pain  -MS     Posttreatment Pain Rating  0/10 - no pain  -MS     Row Name 11/19/20 1058          Pain Scale: FACES Pre/Post-Treatment    Pain: FACES Scale, Pretreatment  0-->no hurt  -MS     Posttreatment Pain Rating  0-->no hurt  -MS     Row Name 11/19/20 1058          Positioning and Restraints    Pre-Treatment Position  in bed  -MS     Post Treatment Position  bed  -MS     In Bed  notified nsg;supine;call light within reach;encouraged to call for assist;exit alarm on;R waffle boot;L waffle boot;SCD pump applied All lines intact.  -MS       User Key  (r) = Recorded By, (t) = Taken By, (c) = Cosigned By    Initials Name Provider Type    Andrea Jasmine, PT Physical Therapist        Outcome Measures     Row Name 11/19/20 1058          How much help from another person do you currently need...    Turning from your back to your side while in flat bed without using bedrails?  2  -MS     Moving from lying on back to sitting on the side of a flat bed without bedrails?  2  -MS     Moving to and from a bed to a chair (including a wheelchair)?  2  -MS     Standing up from a chair using your arms (e.g., wheelchair, bedside chair)?  2  -MS     Climbing 3-5 steps with a railing?  1  -MS     To walk in hospital room?  1  -MS     AM-PAC 6 Clicks Score (PT)  10  -MS       User Key  (r) = Recorded By, (t) = Taken By, (c) = Cosigned By    Initials Name Provider Type    Andrea Jasmine, PT Physical Therapist        Physical Therapy Education                 Title: PT OT SLP Therapies (In Progress)     Topic: Physical Therapy (In Progress)     Point: Mobility training (In Progress)     Learning Progress Summary           Patient Acceptance, E,D, NR by MS at 11/19/2020 1059    Nonacceptance, E,D, NR by MS at 11/18/2020 1042    Acceptance, E,D, NR by MS at 11/17/2020 1149                   Point: Home exercise program (In Progress)     Learning Progress Summary           Patient Acceptance, E,D, NR by MS at  11/19/2020 1059    Nonacceptance, E,D, NR by MS at 11/18/2020 1042    Acceptance, E,D, NR by MS at 11/17/2020 1149                   Point: Body mechanics (In Progress)     Learning Progress Summary           Patient Acceptance, E,D, NR by MS at 11/19/2020 1059    Nonacceptance, E,D, NR by MS at 11/18/2020 1042    Acceptance, E,D, NR by MS at 11/17/2020 1149                   Point: Precautions (In Progress)     Learning Progress Summary           Patient Acceptance, E,D, NR by MS at 11/19/2020 1059    Nonacceptance, E,D, NR by MS at 11/18/2020 1042    Acceptance, E,D, NR by MS at 11/17/2020 1149                               User Key     Initials Effective Dates Name Provider Type Discipline    MS 04/03/18 -  Andrea Newby, PT Physical Therapist PT              PT Recommendation and Plan  Planned Therapy Interventions (PT): balance training, bed mobility training, gait training, home exercise program, patient/family education, postural re-education, transfer training, strengthening, ROM (range of motion)  Plan of Care Reviewed With: patient  Outcome Summary: Pt. requires Mod. assist x 2 for bed mobility and Mod. assist x 2 for sit <-> stand transfers this date. Pt. performed sit <-> stand transfers x 3 reps at bedside for functional strength training (Bilateral knees/feet blocked for safety).  BLE ther. ex. program completed with assist for general strengthening. Verbal/tactile cues throughout upright mobility for posture correction.     Time Calculation:   PT Charges     Row Name 11/19/20 1101             Time Calculation    Start Time  0917  -MS      Stop Time  0935  -MS      Time Calculation (min)  18 min  -MS      PT Received On  11/19/20  -MS      PT - Next Appointment  11/20/20  -MS         Time Calculation- PT    Total Timed Code Minutes- PT  16 minute(s)  -MS        User Key  (r) = Recorded By, (t) = Taken By, (c) = Cosigned By    Initials Name Provider Type    Andrea Jasmine, PT Physical  Therapist        Therapy Charges for Today     Code Description Service Date Service Provider Modifiers Qty    40592382699 HC PT THER PROC EA 15 MIN 11/18/2020 Andrea Newby, PT GP 1    89529431491 HC PT THER SUPP EA 15 MIN 11/18/2020 Andrea Newby, PT GP 1    75042608516 HC PT THER PROC EA 15 MIN 11/19/2020 Andrea Newby, PT GP 1    20351097447 HC PT THER SUPP EA 15 MIN 11/19/2020 Andrea Newby, PT GP 1          PT G-Codes  Outcome Measure Options: AM-PAC 6 Clicks Basic Mobility (PT)  AM-PAC 6 Clicks Score (PT): 10  AM-PAC 6 Clicks Score (OT): 8    Andrea Newby, PT  11/19/2020

## 2020-11-19 NOTE — PLAN OF CARE
Problem: Adult Inpatient Plan of Care  Goal: Plan of Care Review  Flowsheets (Taken 11/19/2020 6744)  Plan of Care Reviewed With: patient  Outcome Summary: Pt. requires Mod. assist x 2 for bed mobility and Mod. assist x 2 for sit <-> stand transfers this date. Pt. performed sit <-> stand transfers x 3 reps at bedside for functional strength training (Bilateral knees/feet blocked for safety).  BLE ther. ex. program completed with assist for general strengthening. Verbal/tactile cues throughout upright mobility for posture correction.   Patient was wearing a face mask during this therapy encounter. Therapist used appropriate personal protective equipment including eye protection, mask, and gloves.  Mask used was standard procedure mask. Appropriate PPE was worn during the entire therapy session. Hand hygiene was completed before and after therapy session. Patient is not in enhanced droplet precautions.

## 2020-11-19 NOTE — PROGRESS NOTES
Continued Stay Note  [facility]     Patient Name: Christianne Ordonez  MRN: [id number]  Today's Date: 11/19/2020    Admit Date: 11/15/2020    Discharge Plan     Row Name 11/19/20 1424       Plan    Plan  Return home with son. [facility] Per daughter, they provide 24/7 care for pt. Refusing HH at this time.    Patient/Family in Agreement with Plan  yes    Plan Comments  Received call from Cindy Ordonez, daughter/HCS, [phone] stating they wanted HH at D/C. Requested [facility]  but they are unable to accept. Referral placed to [facility] and they have accepted. Family to transport home today. Scar Isaacs RN-BC        Discharge Codes    No documentation.       Expected Discharge Date and Time     Expected Discharge Date Expected Discharge Time    Nov 19, 2020             Scar Isaacs RN

## 2020-11-19 NOTE — PROGRESS NOTES
"  Infectious Diseases Progress Note    Wilian Campbell MD     [facility]  [place]: 3 days  Patient Identification:  Name: Christianne Ordonez  Age: 89 y.o.  Sex: female  :  1929  MRN: [id number]         Primary Care Physician: Ray Torre MD            Subjective: Feeling better and is spontaneously smiling.  Appears comfortable.  Interval History: See consultation note.    Objective:    Scheduled Meds:ampicillin, 500 mg, Oral, Q6H  apixaban, 2.5 mg, Oral, BID  atorvastatin, 10 mg, Oral, Nightly  carvedilol, 6.25 mg, Oral, BID With Meals  donepezil, 10 mg, Oral, Nightly  doxepin, 25 mg, Oral, Nightly  levothyroxine, 50 mcg, Oral, Daily  pantoprazole, 40 mg, Oral, Q AM  PARoxetine, 20 mg, Oral, Daily  QUEtiapine, 25 mg, Oral, Nightly      Continuous Infusions:Pharmacy to dose vancomycin,         Vital signs in last 24 hours:  Temp:  [98.4 °F (36.9 °C)-98.8 °F (37.1 °C)] 98.5 °F (36.9 °C)  Heart Rate:  [] 95  Resp:  [16-20] 16  BP: (105-135)/(65-83) 135/83    Intake/Output:    Intake/Output Summary (Last 24 hours) at 2020 0847  Last data filed at 2020 1400  Gross per 24 hour   Intake 400 ml   Output --   Net 400 ml       Exam:  /83 (BP Location: Left arm, Patient Position: Lying)   Pulse 95   Temp 98.5 °F (36.9 °C) (Oral)   Resp 16   Ht 170.2 cm (67\")   Wt 72.6 kg (160 lb 1.6 oz)   SpO2 93%   BMI 25.08 kg/m²   Patient is examined using the personal protective equipment as per guidelines from infection control for this particular patient as enacted.  Hand washing was performed before and after patient interaction.  General Appearance:    Alert, cooperative, no distress,                          Head:    Normocephalic, without obvious abnormality, atraumatic                           Eyes:    PERRL, conjunctivae/corneas clear, EOM's intact, both eyes                         Throat:   Lips, tongue, gums normal; oral mucosa pink and moist                           Neck:   " Supple, symmetrical, trachea midline, no JVD                         Lungs:    Clear to auscultation bilaterally, respirations unlabored                 Chest Wall:    No tenderness or deformity                          Heart:  S1-S2 regular                  Abdomen:   Soft nontender and obese                 extremities:   Extremities normal, atraumatic, no cyanosis or edema                        Pulses:   Pulses palpable in all extremities                            Skin:   Skin is warm and dry,  no rashes or palpable lesions                  Neurologic: Grossly nonfocal       Data Review:    I reviewed the patient's new clinical results.  Results from last 7 days   Lab Units 11/19/20 0344 11/18/20 0610 11/17/20 0438 11/15/20  2311   WBC 10*3/mm3 11.32* 10.53 14.13* 13.48*   HEMOGLOBIN g/dL 12.2 12.5 13.6 13.6   PLATELETS 10*3/mm3 293 271 323 260     Results from last 7 days   Lab Units 11/19/20 0344 11/18/20 0610 11/17/20 0438 11/15/20  2311   SODIUM mmol/L 141 142 140 140   POTASSIUM mmol/L 3.9 3.8 4.3 4.5   CHLORIDE mmol/L 104 104 102 101   CO2 mmol/L 29.4* 27.4 27.2 27.4   BUN mg/dL 17 22 18 17   CREATININE mg/dL 0.67 0.68 0.63 0.94   CALCIUM mg/dL 8.6 8.9 9.1 9.6   GLUCOSE mg/dL 110* 107* 93 126*     Microbiology Results (last 10 days)     Procedure Component Value - Date/Time    COVID PRE-OP / PRE-PROCEDURE SCREENING ORDER (NO ISOLATION) - Swab, Nasopharynx [[id number]]  (Normal) Collected: 11/16/20 0038    Lab Status: Final result Specimen: Swab from Nasopharynx Updated: 11/16/20 0207    Narrative:      The following orders were created for panel order COVID PRE-OP / PRE-PROCEDURE SCREENING ORDER (NO ISOLATION) - Swab, Nasopharynx.  Procedure                               Abnormality         Status                     ---------                               -----------         ------                     Respiratory Panel PCR w/...[[id number]]  Normal              Final result                 Please  view results for these tests on the individual orders.    Respiratory Panel PCR w/COVID-19(SARS-CoV-2) [facility] In-House, NP Swab in UTM/VTM, 3-4 HR TAT - Swab, Nasopharynx [[id number]]  (Normal) Collected: 11/16/20 0038    Lab Status: Final result Specimen: Swab from Nasopharynx Updated: 11/16/20 0207     ADENOVIRUS, PCR Not Detected     Coronavirus 229E Not Detected     Coronavirus HKU1 Not Detected     Coronavirus NL63 Not Detected     Coronavirus OC43 Not Detected     COVID19 Not Detected     Human Metapneumovirus Not Detected     Human Rhinovirus/Enterovirus Not Detected     Influenza A PCR Not Detected     Influenza B PCR Not Detected     Parainfluenza Virus 1 Not Detected     Parainfluenza Virus 2 Not Detected     Parainfluenza Virus 3 Not Detected     Parainfluenza Virus 4 Not Detected     RSV, PCR Not Detected     Bordetella pertussis pcr Not Detected     Bordetella parapertussis PCR Not Detected     Chlamydophila pneumoniae PCR Not Detected     Mycoplasma pneumo by PCR Not Detected    Narrative:      Fact sheet for providers: https://docs.[employer]/wp-content/uploads/MTZ2255-5377-OA9.1-EUA-Provider-Fact-Sheet-3.pdf    Fact sheet for patients: https://docs.[employer]/wp-content/uploads/UGW8032-1362-AN2.1-EUA-Patient-Fact-Sheet-1.pdf    Urine Culture - Urine, Urine, Catheter [[id number]]  (Abnormal)  (Susceptibility) Collected: 11/15/20 2322    Lab Status: Final result Specimen: Urine, Catheter Updated: 11/18/20 1154     Urine Culture >100,000 CFU/mL Enterococcus faecalis    Susceptibility      Enterococcus faecalis     ZA     Ampicillin Susceptible     Levofloxacin Susceptible     Nitrofurantoin Susceptible     Tetracycline Susceptible     Vancomycin Susceptible                            Assessment:    Altered mental status   1-toxic metabolic encephalopathy secondary to urinary tract infection with gram-positive cocci finalized as Enterococcus faecalis  2-dementia with previous  CVA and expressive aphasia  3-history of atrial fibrillation  4-hypothyroidism  5-chronic anticoagulation therapy  6-polypharmacy with multiple medications that can further cloud her sensorium - Concomitant use of Paxil, quetiapine, doxepin, Aricept and Ativan.  7-Status post right hip femoral neck fracture for which he has undergone bipolar hemiarthroplasty in June 2020.        Recommendations/Discussions:  Simplify vancomycin to oral ampicillin.    If patient continues to do well patient can be discharged on 5 days of oral ampicillin for Enterococcus UTI/cystitis.  Wilian Campbell MD  11/19/2020  08:47 EST    Much of this encounter note is an electronic transcription/translation of spoken language to printed text. The electronic translation of spoken language may permit erroneous, or at times, nonsensical words or phrases to be inadvertently transcribed; Although I have reviewed the note for such errors, some may still exist

## 2020-11-19 NOTE — PROGRESS NOTES
"Daily progress note    Chief complaint  Doing better  No specific complaints   Wants to go home  Family at bedside    History of present illness  89-year-old white female who is well-known to our service with history of severe dementia chronic atrial fibrillation depression hypertension hyperlipidemia hypothyroidism anxiety disorder and gastroesophageal reflux disease who is a nursing home resident presented to [facility] emergency room with altered mental status.  Patient work-up in ER revealed recurrent UTI admit for management.  Patient is demented unable to give any history.  Patient is no respite distress and also according to nursing staff no fever cough or increased shortness of breath.  Patient Covid 19 test also came back negative.  Patient admitted for management.  Patient is DNR per her wishes    REVIEW OF SYSTEMS  Unable to perform     PHYSICAL EXAM  Blood pressure 129/66, pulse 97, temperature 98.5 °F (36.9 °C), temperature source Oral, resp. rate 16, height 170.2 cm (67\"), weight 72.6 kg (160 lb 1.6 oz), SpO2 93 %.    Constitutional: No distress.   Head: Normocephalic and atraumatic.   Eyes: Pupils are equal, round, and reactive to light. EOM are normal.   Neck: Normal range of motion. Neck supple.   Cardiovascular: Regular rhythm and normal heart sounds. Tachycardia present.   Pulmonary/Chest: Effort normal and breath sounds normal. No respiratory distress.   Abdominal: Soft. There is no abdominal tenderness. There is no rebound and no guarding.   Musculoskeletal: Normal range of motion.    No edema.   Neurological: She has normal sensation and normal strength. Patient currently nonverbal and unable to answer questions   Skin: Skin is warm and dry. No rash noted.     LAB RESULTS  Lab Results (last 24 hours)     Procedure Component Value Units Date/Time    Basic Metabolic Panel [[id number]]  (Abnormal) Collected: 11/19/20 0344    Specimen: Blood Updated: 11/19/20 0515     Glucose 110 mg/dL  "     BUN 17 mg/dL      Creatinine 0.67 mg/dL      Sodium 141 mmol/L      Potassium 3.9 mmol/L      Chloride 104 mmol/L      CO2 29.4 mmol/L      Calcium 8.6 mg/dL      eGFR Non African Amer 83 mL/min/1.73      BUN/Creatinine Ratio 25.4     Anion Gap 7.6 mmol/L     Narrative:      GFR Normal >60  Chronic Kidney Disease <60  Kidney Failure <15      CBC & Differential [[id number]]  (Abnormal) Collected: 11/19/20 0344    Specimen: Blood Updated: 11/19/20 0453    Narrative:      The following orders were created for panel order CBC & Differential.  Procedure                               Abnormality         Status                     ---------                               -----------         ------                     CBC Auto Differential[[id number]]        Abnormal            Final result                 Please view results for these tests on the individual orders.    CBC Auto Differential [[id number]]  (Abnormal) Collected: 11/19/20 0344    Specimen: Blood Updated: 11/19/20 0453     WBC 11.32 10*3/mm3      RBC 4.14 10*6/mm3      Hemoglobin 12.2 g/dL      Hematocrit 37.0 %      MCV 89.4 fL      MCH 29.5 pg      MCHC 33.0 g/dL      RDW 15.5 %      RDW-SD 49.6 fl      MPV 10.1 fL      Platelets 293 10*3/mm3      Neutrophil % 66.4 %      Lymphocyte % 19.8 %      Monocyte % 9.2 %      Eosinophil % 3.3 %      Basophil % 0.9 %      Immature Grans % 0.4 %      Neutrophils, Absolute 7.53 10*3/mm3      Lymphocytes, Absolute 2.24 10*3/mm3      Monocytes, Absolute 1.04 10*3/mm3      Eosinophils, Absolute 0.37 10*3/mm3      Basophils, Absolute 0.10 10*3/mm3      Immature Grans, Absolute 0.04 10*3/mm3      nRBC 0.0 /100 WBC     T4, Free [[id number]]  (Normal) Collected: 11/18/20 1441    Specimen: Blood from Arm, Left Updated: 11/18/20 1614     Free T4 1.03 ng/dL     Narrative:      Results may be falsely increased if patient taking Biotin.          Imaging Results (Last 24 Hours)     ** No results found for the last 24 hours. **            ECG 12 Lead               HEART RATE= 100  bpm  RR Interval= 600  ms  LA Interval=   ms  P Horizontal Axis=   deg  P Front Axis=   deg  QRSD Interval= 78  ms  QT Interval= 362  ms  QRS Axis= -35  deg  T Wave Axis= 76  deg  - ABNORMAL ECG -  Atrial fibrillation  Left axis deviation  Low voltage, precordial leads  Nonspecific T abnormalities, lateral leads  Atrial fibrillation new vs previous ecg             Current Facility-Administered Medications:   •  ampicillin (PRINCIPEN) capsule 500 mg, 500 mg, Oral, Q6H, Wilian Campbell MD, 500 mg at 11/19/20 1142  •  apixaban (ELIQUIS) tablet 2.5 mg, 2.5 mg, Oral, BID, Emily Moreno MD, 2.5 mg at 11/19/20 0914  •  atorvastatin (LIPITOR) tablet 10 mg, 10 mg, Oral, Nightly, Emily Moreno MD, 10 mg at 11/18/20 2305  •  carvedilol (COREG) tablet 6.25 mg, 6.25 mg, Oral, BID With Meals, Emily Moreno MD, 6.25 mg at 11/19/20 0914  •  donepezil (ARICEPT) tablet 10 mg, 10 mg, Oral, Nightly, Emily Moreno MD, 10 mg at 11/18/20 2305  •  doxepin (SINEquan) capsule 25 mg, 25 mg, Oral, Nightly, Emily Moreno MD, 25 mg at 11/18/20 2305  •  levothyroxine (SYNTHROID, LEVOTHROID) tablet 50 mcg, 50 mcg, Oral, Daily, Emily Moreno MD, 50 mcg at 11/19/20 0914  •  pantoprazole (PROTONIX) EC tablet 40 mg, 40 mg, Oral, Q AM, Emily Moreno MD, 40 mg at 11/19/20 0619  •  PARoxetine (PAXIL) tablet 20 mg, 20 mg, Oral, Daily, Emily Moreno MD, 20 mg at 11/19/20 0914  •  QUEtiapine (SEROquel) tablet 25 mg, 25 mg, Oral, Nightly, Emily Moreno MD, 25 mg at 11/18/20 2306     ASSESSMENT  Acute Enterococcus UTI  Severe dementia  Chronic atrial fibrillation on Eliquis  Hypertension  Hyperlipidemia  Hypothyroidism  Anxiety disorder  Depression  Osteoarthritis  Gastroesophageal reflux disease    PLAN  Discharge home on oral antibiotics for 5 more days with family support  Discharge summary dictated    EMILY MORENO MD

## 2020-11-19 NOTE — PLAN OF CARE
Goal Outcome Evaluation:  Plan of Care Reviewed With: patient  Progress: improving  Outcome Summary: no acute changes throughout shift. pt more alert this shift than previous nights. abx transitioned to PO-tolerating well so far. VSS. continue to monitor

## 2020-11-20 NOTE — OUTREACH NOTE
Prep Survey      Responses   [facility] facility patient discharged from?  [place]   Is LACE score < 7 ?  No   Eligibility  Readm Mgmt   Discharge diagnosis  Acute Enterococcus UTI   Does the patient have one of the following disease processes/diagnoses(primary or secondary)?  Other   Does the patient have Home health ordered?  Yes   [facility]    Prep survey completed?  Yes          Rashmi Hill RN

## 2020-11-20 NOTE — PROGRESS NOTES
Case Management Discharge Note      Final Note: Return home with son. Eriberto  to see at D/C. Per daughter, they provide 24/7 care Family transported    Provided Post Acute Provider List?: Refused  Refused Provider List Comment: Denied any needs for services    Selected Continued Care - Discharged on 11/19/2020 Admission date: 11/15/2020 - Discharge disposition: Home or Self Care    Destination    No services have been selected for the patient.              Durable Medical Equipment    No services have been selected for the patient.              Dialysis/Infusion    No services have been selected for the patient.              Home Medical Care Coordination complete    Service Provider Selected Services Address Phone Fax Patient Preferred    [facility] [id number]  [place] [address] [phone] --          Therapy    No services have been selected for the patient.              Community Resources    No services have been selected for the patient.                  Transportation Services  Private: Car    Final Discharge Disposition Code: 06 - home with home health care

## 2020-11-24 NOTE — OUTREACH NOTE
Medical Week 1 Survey      Responses   [facility] patient discharged from?  [place]   Does the patient have one of the following disease processes/diagnoses(primary or secondary)?  Other   Week 1 attempt successful?  No   Unsuccessful attempts  Attempt 1          Mi Hernandez RN

## 2020-11-30 NOTE — OUTREACH NOTE
Medical Week 2 Survey      Responses   [facility] patient discharged from?  [place]   Does the patient have one of the following disease processes/diagnoses(primary or secondary)?  Other   Week 2 attempt successful?  No   Unsuccessful attempts  Attempt 1          Gabriela Clarke RN

## 2020-12-04 NOTE — OUTREACH NOTE
Medical Week 2 Survey      Responses   [facility] patient discharged from?  [place]   Does the patient have one of the following disease processes/diagnoses(primary or secondary)?  Other   Week 2 attempt successful?  No   Unsuccessful attempts  Attempt 2          Sandra Merlos RN

## 2021-01-01 ENCOUNTER — TELEPHONE (OUTPATIENT)
Dept: NEUROLOGY | Facility: CLINIC | Age: 86
End: 2021-01-01

## 2021-01-01 ENCOUNTER — OFFICE VISIT (OUTPATIENT)
Dept: NEUROLOGY | Facility: CLINIC | Age: 86
End: 2021-01-01

## 2021-01-01 VITALS
WEIGHT: 160 LBS | HEIGHT: 67 IN | HEART RATE: 87 BPM | OXYGEN SATURATION: 94 % | BODY MASS INDEX: 25.11 KG/M2 | DIASTOLIC BLOOD PRESSURE: 72 MMHG | SYSTOLIC BLOOD PRESSURE: 110 MMHG

## 2021-01-01 DIAGNOSIS — Z91.81 RISK FOR FALLS: ICD-10-CM

## 2021-01-01 DIAGNOSIS — I48.0 PAROXYSMAL ATRIAL FIBRILLATION (HCC): ICD-10-CM

## 2021-01-01 DIAGNOSIS — I67.9 CEREBROVASCULAR DISEASE: ICD-10-CM

## 2021-01-01 DIAGNOSIS — F03.90 DEMENTIA WITHOUT BEHAVIORAL DISTURBANCE, UNSPECIFIED DEMENTIA TYPE: Primary | ICD-10-CM

## 2021-01-01 DIAGNOSIS — Z23 IMMUNIZATION DUE: ICD-10-CM

## 2021-01-01 DIAGNOSIS — E78.2 MIXED HYPERLIPIDEMIA: ICD-10-CM

## 2021-01-01 PROCEDURE — 99215 OFFICE O/P EST HI 40 MIN: CPT | Performed by: NURSE PRACTITIONER

## 2021-01-01 RX ORDER — ALBUTEROL SULFATE 2.5 MG/3ML
2.5 SOLUTION RESPIRATORY (INHALATION)
COMMUNITY
Start: 2020-01-01

## 2021-01-01 RX ORDER — MEMANTINE HYDROCHLORIDE 5 MG-10 MG
KIT ORAL
Qty: 1 EACH | Refills: 0 | Status: SHIPPED | OUTPATIENT
Start: 2021-01-01

## 2021-08-06 NOTE — TELEPHONE ENCOUNTER
“Please be informed that patient has passed. Patient has been marked  in the system. The date of death is:  MARKED AS TODAY .     Caller: GRAND DAUGHTER JESSICA

## 2025-03-19 NOTE — DISCHARGE PLACEMENT REQUEST
"Christianne Ordonez (89 y.o. Female)     Date of Birth Social Security Number Address Home Phone MRN    05/08/1929  [id number] [id number] [phone] [id number]    [employer] Marital Status          Hindu        Admission Date Admission Type Admitting Provider Attending Provider Department, Room/Bed    6/11/20 Emergency Fortino Moreno MD Ahmed, Aftab, MD [address], P892/1    Discharge Date Discharge Disposition Discharge Destination         Skilled Nursing Facility (DC - External)              Attending Provider:  Fortino Moreno MD    Allergies:  No Known Allergies    Isolation:  None   Infection:  None   Code Status:  No CPR    Ht:  167.6 cm (65.98\")   Wt:  75.8 kg (167 lb 1.7 oz)    Admission Cmt:  None   Principal Problem:  None                Active Insurance as of 6/11/2020     Primary Coverage     Payor Plan Insurance Group Employer/Plan Group    MEDICARE MEDICARE A & B      Payor Plan Address Payor Plan Phone Number Payor Plan Fax Number Effective Dates    [address] [phone]  5/1/1994 - None Entered    [facility] [id number]       Subscriber Name Subscriber Birth Date Member ID       CHRISTIANNE ORDONEZ 5/8/1929 [id number]           Secondary Coverage     Payor Plan Insurance Group Employer/Plan Group    [place]      Payor Plan Address Payor Plan Phone Number Payor Plan Fax Number Effective Dates    [address] [phone]  2/21/2019 - None Entered    [place] [address]       Subscriber Name Subscriber Birth Date Member ID       CHRISTIANNE ORDONEZ 5/8/1929 [id number]                 Emergency Contacts      (Rel.) Home Phone Work Phone Mobile Phone    TomaszCindy loza (Daughter) [phone] -- [phone]    MeryJimi (Son) [phone] -- [phone]    Lenora Montero (Grandchild) [phone] -- [phone]        " 92

## (undated) DEVICE — ANTIBACTERIAL UNDYED BRAIDED (POLYGLACTIN 910), SYNTHETIC ABSORBABLE SUTURE: Brand: COATED VICRYL

## (undated) DEVICE — DISPOSABLE TOURNIQUET CUFF SINGLE BLADDER, SINGLE PORT AND QUICK CONNECT CONNECTOR: Brand: COLOR CUFF

## (undated) DEVICE — APPL CHLORAPREP W/TINT 26ML ORNG

## (undated) DEVICE — SUT VIC 0 CT1 CR8 18IN J840D

## (undated) DEVICE — UNDERCAST PADDING: Brand: DEROYAL

## (undated) DEVICE — IMMOB KN FELT LG/XL 21IN

## (undated) DEVICE — TOWEL,OR,DSP,ST,BLUE,STD,4/PK,20PK/CS: Brand: MEDLINE

## (undated) DEVICE — OPTIFOAM GENTLE SA, POSTOP, 4X8: Brand: MEDLINE

## (undated) DEVICE — HEWSON SUTURE RETRIEVER: Brand: HEWSON SUTURE RETRIEVER

## (undated) DEVICE — INTEGUSEAL MICROBIAL SEALANT: Brand: AVANOS

## (undated) DEVICE — TRAP FLD MINIVAC MEGADYNE 100ML

## (undated) DEVICE — PK ORTHO MAJ 40

## (undated) DEVICE — MAT FLR ABSORBENT LG 4FT 10 2.5FT

## (undated) DEVICE — ADHS SKIN DERMABOND TOP ADVANCED

## (undated) DEVICE — PREP SOL POVIDONE/IODINE BT 4OZ

## (undated) DEVICE — SOL ISO/ALC RUB 70PCT 4OZ

## (undated) DEVICE — DRSNG WND GZ CURAD OIL EMULSION 3X8IN LF STRL 1PK

## (undated) DEVICE — GLV SURG TRIUMPH CLASSIC PF LTX 8.5 STRL

## (undated) DEVICE — CATH IV INSYTE AUTOGARD 14G 1 1/2IN ORNG

## (undated) DEVICE — SKIN PREP TRAY W/CHG: Brand: MEDLINE INDUSTRIES, INC.

## (undated) DEVICE — SUT VIC 1 CT1 36IN J947H

## (undated) DEVICE — GLV SURG SIGNATURE ESSENTIAL PF LTX SZ8

## (undated) DEVICE — SUT MNCRYL PLS ANTIB UD 4/0 PS2 18IN

## (undated) DEVICE — SPNG GZ WOVN 4X4IN 12PLY 10/BX STRL

## (undated) DEVICE — GLV SURG SENSICARE ORTHO PF LF 9 STRL

## (undated) DEVICE — BNDG ELAS ELITE V/CLOSE 6IN 5YD LF STRL

## (undated) DEVICE — PK ANT HIP 40

## (undated) DEVICE — PENCL E/S ULTRAVAC TELESCP NOSE HOLSTR 10FT

## (undated) DEVICE — APPL CHLORAPREP HI/LITE 26ML ORNG

## (undated) DEVICE — T-DRAPE,EXTREMITY,STERILE: Brand: MEDLINE

## (undated) DEVICE — DRP C/ARM 41X74IN

## (undated) DEVICE — DRSNG ADAPTIC 3X8

## (undated) DEVICE — BNDG ESMARK 4IN 12FT LF STRL BLU

## (undated) DEVICE — Device

## (undated) DEVICE — GLV SURG BIOGEL LTX PF 8 1/2

## (undated) DEVICE — STPLR SKIN VISISTAT WD 35CT